# Patient Record
Sex: FEMALE | Race: WHITE
[De-identification: names, ages, dates, MRNs, and addresses within clinical notes are randomized per-mention and may not be internally consistent; named-entity substitution may affect disease eponyms.]

---

## 2018-05-09 ENCOUNTER — HOSPITAL ENCOUNTER (OUTPATIENT)
Dept: HOSPITAL 84 - D.LABREF | Age: 73
Discharge: HOME | End: 2018-05-09
Attending: ORTHOPAEDIC SURGERY
Payer: MEDICARE

## 2018-05-09 VITALS — BODY MASS INDEX: 50.4 KG/M2

## 2018-05-09 DIAGNOSIS — M17.12: Primary | ICD-10-CM

## 2018-05-18 ENCOUNTER — HOSPITAL ENCOUNTER (INPATIENT)
Dept: HOSPITAL 84 - D.MS | Age: 73
LOS: 13 days | Discharge: NURSING, REHAB OR LONG-TERM CARE | DRG: 467 | End: 2018-05-31
Attending: ORTHOPAEDIC SURGERY | Admitting: ORTHOPAEDIC SURGERY
Payer: MEDICARE

## 2018-05-18 VITALS — DIASTOLIC BLOOD PRESSURE: 69 MMHG | SYSTOLIC BLOOD PRESSURE: 121 MMHG

## 2018-05-18 VITALS — DIASTOLIC BLOOD PRESSURE: 64 MMHG | SYSTOLIC BLOOD PRESSURE: 162 MMHG

## 2018-05-18 VITALS — DIASTOLIC BLOOD PRESSURE: 59 MMHG | SYSTOLIC BLOOD PRESSURE: 115 MMHG

## 2018-05-18 VITALS
WEIGHT: 285 LBS | BODY MASS INDEX: 50.5 KG/M2 | BODY MASS INDEX: 50.5 KG/M2 | WEIGHT: 285 LBS | BODY MASS INDEX: 50.5 KG/M2 | HEIGHT: 63 IN | HEIGHT: 63 IN | BODY MASS INDEX: 50.5 KG/M2

## 2018-05-18 VITALS — DIASTOLIC BLOOD PRESSURE: 55 MMHG | SYSTOLIC BLOOD PRESSURE: 124 MMHG

## 2018-05-18 VITALS — DIASTOLIC BLOOD PRESSURE: 61 MMHG | SYSTOLIC BLOOD PRESSURE: 98 MMHG

## 2018-05-18 VITALS — SYSTOLIC BLOOD PRESSURE: 119 MMHG | DIASTOLIC BLOOD PRESSURE: 66 MMHG

## 2018-05-18 VITALS — SYSTOLIC BLOOD PRESSURE: 113 MMHG | DIASTOLIC BLOOD PRESSURE: 47 MMHG

## 2018-05-18 VITALS — SYSTOLIC BLOOD PRESSURE: 127 MMHG | DIASTOLIC BLOOD PRESSURE: 70 MMHG

## 2018-05-18 VITALS — SYSTOLIC BLOOD PRESSURE: 145 MMHG | DIASTOLIC BLOOD PRESSURE: 64 MMHG

## 2018-05-18 VITALS — DIASTOLIC BLOOD PRESSURE: 79 MMHG | SYSTOLIC BLOOD PRESSURE: 147 MMHG

## 2018-05-18 VITALS — DIASTOLIC BLOOD PRESSURE: 65 MMHG | SYSTOLIC BLOOD PRESSURE: 128 MMHG

## 2018-05-18 DIAGNOSIS — M97.12XA: ICD-10-CM

## 2018-05-18 DIAGNOSIS — K56.7: ICD-10-CM

## 2018-05-18 DIAGNOSIS — I10: ICD-10-CM

## 2018-05-18 DIAGNOSIS — E66.01: ICD-10-CM

## 2018-05-18 DIAGNOSIS — K59.00: ICD-10-CM

## 2018-05-18 DIAGNOSIS — T84.033A: Primary | ICD-10-CM

## 2018-05-18 LAB
ERYTHROCYTE [DISTWIDTH] IN BLOOD BY AUTOMATED COUNT: 14.1 % (ref 11.5–14.5)
HCT VFR BLD CALC: 33.9 % (ref 36–48)
HGB BLD-MCNC: 11.3 G/DL (ref 12–16)
MCH RBC QN AUTO: 27.5 PG (ref 26–34)
MCHC RBC AUTO-ENTMCNC: 33.3 G/DL (ref 31–37)
MCV RBC: 82.5 FL (ref 80–100)
PLATELET # BLD: 269 10X3/UL (ref 130–400)
PMV BLD AUTO: 10 FL (ref 7.4–10.4)
RBC # BLD AUTO: 4.11 10X6/UL (ref 4–5.4)
WBC # BLD AUTO: 9.5 10X3/UL (ref 4.8–10.8)

## 2018-05-18 PROCEDURE — 0QSH04Z REPOSITION LEFT TIBIA WITH INTERNAL FIXATION DEVICE, OPEN APPROACH: ICD-10-PCS | Performed by: ORTHOPAEDIC SURGERY

## 2018-05-18 PROCEDURE — 0SPD0JZ REMOVAL OF SYNTHETIC SUBSTITUTE FROM LEFT KNEE JOINT, OPEN APPROACH: ICD-10-PCS | Performed by: ORTHOPAEDIC SURGERY

## 2018-05-18 PROCEDURE — 0SRD0JZ REPLACEMENT OF LEFT KNEE JOINT WITH SYNTHETIC SUBSTITUTE, OPEN APPROACH: ICD-10-PCS | Performed by: ORTHOPAEDIC SURGERY

## 2018-05-19 VITALS — SYSTOLIC BLOOD PRESSURE: 136 MMHG | DIASTOLIC BLOOD PRESSURE: 49 MMHG

## 2018-05-19 VITALS — SYSTOLIC BLOOD PRESSURE: 129 MMHG | DIASTOLIC BLOOD PRESSURE: 55 MMHG

## 2018-05-19 VITALS — DIASTOLIC BLOOD PRESSURE: 52 MMHG | SYSTOLIC BLOOD PRESSURE: 113 MMHG

## 2018-05-19 VITALS — DIASTOLIC BLOOD PRESSURE: 42 MMHG | SYSTOLIC BLOOD PRESSURE: 121 MMHG

## 2018-05-19 VITALS — SYSTOLIC BLOOD PRESSURE: 149 MMHG | DIASTOLIC BLOOD PRESSURE: 57 MMHG

## 2018-05-19 LAB
ERYTHROCYTE [DISTWIDTH] IN BLOOD BY AUTOMATED COUNT: 14.2 % (ref 11.5–14.5)
HCT VFR BLD CALC: 29.3 % (ref 36–48)
HGB BLD-MCNC: 9.9 G/DL (ref 12–16)
MCH RBC QN AUTO: 27.7 PG (ref 26–34)
MCHC RBC AUTO-ENTMCNC: 33.8 G/DL (ref 31–37)
MCV RBC: 81.8 FL (ref 80–100)
PLATELET # BLD: 263 10X3/UL (ref 130–400)
PMV BLD AUTO: 10.1 FL (ref 7.4–10.4)
RBC # BLD AUTO: 3.58 10X6/UL (ref 4–5.4)
WBC # BLD AUTO: 13.4 10X3/UL (ref 4.8–10.8)

## 2018-05-20 VITALS — DIASTOLIC BLOOD PRESSURE: 52 MMHG | SYSTOLIC BLOOD PRESSURE: 135 MMHG

## 2018-05-20 VITALS — DIASTOLIC BLOOD PRESSURE: 57 MMHG | SYSTOLIC BLOOD PRESSURE: 137 MMHG

## 2018-05-20 LAB
ERYTHROCYTE [DISTWIDTH] IN BLOOD BY AUTOMATED COUNT: 14.4 % (ref 11.5–14.5)
HCT VFR BLD CALC: 27.6 % (ref 36–48)
HGB BLD-MCNC: 9 G/DL (ref 12–16)
MCH RBC QN AUTO: 27.3 PG (ref 26–34)
MCHC RBC AUTO-ENTMCNC: 32.6 G/DL (ref 31–37)
MCV RBC: 83.6 FL (ref 80–100)
PLATELET # BLD: 203 10X3/UL (ref 130–400)
PMV BLD AUTO: 9.8 FL (ref 7.4–10.4)
RBC # BLD AUTO: 3.3 10X6/UL (ref 4–5.4)
WBC # BLD AUTO: 10 10X3/UL (ref 4.8–10.8)

## 2018-05-21 VITALS — DIASTOLIC BLOOD PRESSURE: 49 MMHG | SYSTOLIC BLOOD PRESSURE: 154 MMHG

## 2018-05-21 VITALS — DIASTOLIC BLOOD PRESSURE: 64 MMHG | SYSTOLIC BLOOD PRESSURE: 139 MMHG

## 2018-05-21 VITALS — SYSTOLIC BLOOD PRESSURE: 134 MMHG | DIASTOLIC BLOOD PRESSURE: 59 MMHG

## 2018-05-21 VITALS — SYSTOLIC BLOOD PRESSURE: 154 MMHG | DIASTOLIC BLOOD PRESSURE: 57 MMHG

## 2018-05-21 VITALS — DIASTOLIC BLOOD PRESSURE: 66 MMHG | SYSTOLIC BLOOD PRESSURE: 138 MMHG

## 2018-05-22 VITALS — DIASTOLIC BLOOD PRESSURE: 64 MMHG | SYSTOLIC BLOOD PRESSURE: 175 MMHG

## 2018-05-22 VITALS — DIASTOLIC BLOOD PRESSURE: 71 MMHG | SYSTOLIC BLOOD PRESSURE: 162 MMHG

## 2018-05-22 VITALS — DIASTOLIC BLOOD PRESSURE: 57 MMHG | SYSTOLIC BLOOD PRESSURE: 159 MMHG

## 2018-05-22 VITALS — DIASTOLIC BLOOD PRESSURE: 58 MMHG | SYSTOLIC BLOOD PRESSURE: 159 MMHG

## 2018-05-22 VITALS — DIASTOLIC BLOOD PRESSURE: 66 MMHG | SYSTOLIC BLOOD PRESSURE: 160 MMHG

## 2018-05-22 LAB
ANION GAP SERPL CALC-SCNC: 12.6 MMOL/L (ref 8–16)
BUN SERPL-MCNC: 14 MG/DL (ref 7–18)
CALCIUM SERPL-MCNC: 8.8 MG/DL (ref 8.5–10.1)
CHLORIDE SERPL-SCNC: 103 MMOL/L (ref 98–107)
CO2 SERPL-SCNC: 24.5 MMOL/L (ref 21–32)
CREAT SERPL-MCNC: 0.9 MG/DL (ref 0.6–1.3)
ERYTHROCYTE [DISTWIDTH] IN BLOOD BY AUTOMATED COUNT: 13.9 % (ref 11.5–14.5)
GLUCOSE SERPL-MCNC: 126 MG/DL (ref 74–106)
HCT VFR BLD CALC: 30.8 % (ref 36–48)
HGB BLD-MCNC: 10.4 G/DL (ref 12–16)
MCH RBC QN AUTO: 27.8 PG (ref 26–34)
MCHC RBC AUTO-ENTMCNC: 33.8 G/DL (ref 31–37)
MCV RBC: 82.4 FL (ref 80–100)
OSMOLALITY SERPL CALC.SUM OF ELEC: 274 MOSM/KG (ref 275–300)
PLATELET # BLD: 276 10X3/UL (ref 130–400)
PMV BLD AUTO: 9.3 FL (ref 7.4–10.4)
POTASSIUM SERPL-SCNC: 4.1 MMOL/L (ref 3.5–5.1)
RBC # BLD AUTO: 3.74 10X6/UL (ref 4–5.4)
SODIUM SERPL-SCNC: 136 MMOL/L (ref 136–145)
WBC # BLD AUTO: 13.3 10X3/UL (ref 4.8–10.8)

## 2018-05-23 VITALS — DIASTOLIC BLOOD PRESSURE: 58 MMHG | SYSTOLIC BLOOD PRESSURE: 164 MMHG

## 2018-05-23 VITALS — DIASTOLIC BLOOD PRESSURE: 55 MMHG | SYSTOLIC BLOOD PRESSURE: 144 MMHG

## 2018-05-23 VITALS — DIASTOLIC BLOOD PRESSURE: 65 MMHG | SYSTOLIC BLOOD PRESSURE: 148 MMHG

## 2018-05-23 VITALS — DIASTOLIC BLOOD PRESSURE: 55 MMHG | SYSTOLIC BLOOD PRESSURE: 148 MMHG

## 2018-05-23 VITALS — SYSTOLIC BLOOD PRESSURE: 177 MMHG | DIASTOLIC BLOOD PRESSURE: 75 MMHG

## 2018-05-23 LAB
ANION GAP SERPL CALC-SCNC: 13.2 MMOL/L (ref 8–16)
BUN SERPL-MCNC: 24 MG/DL (ref 7–18)
CALCIUM SERPL-MCNC: 8.8 MG/DL (ref 8.5–10.1)
CHLORIDE SERPL-SCNC: 103 MMOL/L (ref 98–107)
CO2 SERPL-SCNC: 24.3 MMOL/L (ref 21–32)
CREAT SERPL-MCNC: 1 MG/DL (ref 0.6–1.3)
ERYTHROCYTE [DISTWIDTH] IN BLOOD BY AUTOMATED COUNT: 14.1 % (ref 11.5–14.5)
GLUCOSE SERPL-MCNC: 133 MG/DL (ref 74–106)
HCT VFR BLD CALC: 30.7 % (ref 36–48)
HGB BLD-MCNC: 10.3 G/DL (ref 12–16)
MCH RBC QN AUTO: 27.5 PG (ref 26–34)
MCHC RBC AUTO-ENTMCNC: 33.6 G/DL (ref 31–37)
MCV RBC: 82.1 FL (ref 80–100)
OSMOLALITY SERPL CALC.SUM OF ELEC: 279 MOSM/KG (ref 275–300)
PLATELET # BLD: 325 10X3/UL (ref 130–400)
PMV BLD AUTO: 9.7 FL (ref 7.4–10.4)
POTASSIUM SERPL-SCNC: 3.5 MMOL/L (ref 3.5–5.1)
RBC # BLD AUTO: 3.74 10X6/UL (ref 4–5.4)
SODIUM SERPL-SCNC: 137 MMOL/L (ref 136–145)
WBC # BLD AUTO: 15.4 10X3/UL (ref 4.8–10.8)

## 2018-05-24 VITALS — DIASTOLIC BLOOD PRESSURE: 72 MMHG | SYSTOLIC BLOOD PRESSURE: 135 MMHG

## 2018-05-24 VITALS — SYSTOLIC BLOOD PRESSURE: 141 MMHG | DIASTOLIC BLOOD PRESSURE: 72 MMHG

## 2018-05-24 VITALS — SYSTOLIC BLOOD PRESSURE: 154 MMHG | DIASTOLIC BLOOD PRESSURE: 69 MMHG

## 2018-05-24 VITALS — SYSTOLIC BLOOD PRESSURE: 155 MMHG | DIASTOLIC BLOOD PRESSURE: 62 MMHG

## 2018-05-24 VITALS — DIASTOLIC BLOOD PRESSURE: 79 MMHG | SYSTOLIC BLOOD PRESSURE: 150 MMHG

## 2018-05-24 LAB
AMORPHOUS SEDIMENT: (no result) /LPF
ANION GAP SERPL CALC-SCNC: 13 MMOL/L (ref 8–16)
APPEARANCE UR: (no result)
BACTERIA #/AREA URNS HPF: (no result) /HPF
BILIRUB SERPL-MCNC: NEGATIVE MG/DL
BUN SERPL-MCNC: 27 MG/DL (ref 7–18)
CALCIUM SERPL-MCNC: 9.1 MG/DL (ref 8.5–10.1)
CHLORIDE SERPL-SCNC: 103 MMOL/L (ref 98–107)
CO2 SERPL-SCNC: 27.8 MMOL/L (ref 21–32)
COLOR UR: (no result)
CREAT SERPL-MCNC: 1 MG/DL (ref 0.6–1.3)
ERYTHROCYTE [DISTWIDTH] IN BLOOD BY AUTOMATED COUNT: 14.2 % (ref 11.5–14.5)
GLUCOSE SERPL-MCNC: 135 MG/DL (ref 74–106)
GLUCOSE SERPL-MCNC: NEGATIVE MG/DL
GRAN CASTS #/AREA URNS LPF: (no result) /LPF
HCT VFR BLD CALC: 35.6 % (ref 36–48)
HGB BLD-MCNC: 11.9 G/DL (ref 12–16)
KETONES UR STRIP-MCNC: NEGATIVE MG/DL
MCH RBC QN AUTO: 27.7 PG (ref 26–34)
MCHC RBC AUTO-ENTMCNC: 33.4 G/DL (ref 31–37)
MCV RBC: 83 FL (ref 80–100)
MUCOUS THREADS #/AREA URNS LPF: (no result) /LPF
NITRITE UR-MCNC: NEGATIVE MG/ML
OSMOLALITY SERPL CALC.SUM OF ELEC: 285 MOSM/KG (ref 275–300)
PH UR STRIP: 5 [PH] (ref 5–6)
PLATELET # BLD: 436 10X3/UL (ref 130–400)
PMV BLD AUTO: 9.5 FL (ref 7.4–10.4)
POTASSIUM SERPL-SCNC: 3.8 MMOL/L (ref 3.5–5.1)
PROT UR-MCNC: (no result) MG/DL
RBC # BLD AUTO: 4.29 10X6/UL (ref 4–5.4)
RBC #/AREA URNS HPF: (no result) /HPF (ref 0–5)
SODIUM SERPL-SCNC: 140 MMOL/L (ref 136–145)
SP GR UR STRIP: 1.02 (ref 1–1.02)
SQUAMOUS #/AREA URNS HPF: (no result) /HPF (ref 0–5)
UROBILINOGEN UR-MCNC: NORMAL MG/DL
WBC # BLD AUTO: 17.7 10X3/UL (ref 4.8–10.8)
WBC #/AREA URNS HPF: (no result) /HPF (ref 0–5)

## 2018-05-25 VITALS — SYSTOLIC BLOOD PRESSURE: 148 MMHG | DIASTOLIC BLOOD PRESSURE: 70 MMHG

## 2018-05-25 VITALS — DIASTOLIC BLOOD PRESSURE: 81 MMHG | SYSTOLIC BLOOD PRESSURE: 147 MMHG

## 2018-05-25 VITALS — DIASTOLIC BLOOD PRESSURE: 70 MMHG | SYSTOLIC BLOOD PRESSURE: 172 MMHG

## 2018-05-25 VITALS — DIASTOLIC BLOOD PRESSURE: 69 MMHG | SYSTOLIC BLOOD PRESSURE: 146 MMHG

## 2018-05-25 VITALS — SYSTOLIC BLOOD PRESSURE: 128 MMHG | DIASTOLIC BLOOD PRESSURE: 61 MMHG

## 2018-05-25 VITALS — DIASTOLIC BLOOD PRESSURE: 77 MMHG | SYSTOLIC BLOOD PRESSURE: 156 MMHG

## 2018-05-25 LAB
ANION GAP SERPL CALC-SCNC: 15.4 MMOL/L (ref 8–16)
BUN SERPL-MCNC: 31 MG/DL (ref 7–18)
CALCIUM SERPL-MCNC: 8.8 MG/DL (ref 8.5–10.1)
CHLORIDE SERPL-SCNC: 104 MMOL/L (ref 98–107)
CO2 SERPL-SCNC: 25.4 MMOL/L (ref 21–32)
CREAT SERPL-MCNC: 1 MG/DL (ref 0.6–1.3)
ERYTHROCYTE [DISTWIDTH] IN BLOOD BY AUTOMATED COUNT: 14.4 % (ref 11.5–14.5)
EST. AVERAGE GLUCOSE BLD GHB EST-MCNC: 123 MG/DL (ref 74–154)
FERRITIN SERPL-MCNC: 171 NG/ML (ref 3–244)
GLUCOSE SERPL-MCNC: 132 MG/DL (ref 74–106)
HCT VFR BLD CALC: 34.1 % (ref 36–48)
HGB BLD-MCNC: 11.3 G/DL (ref 12–16)
IRON SERPL-MCNC: 23 UG/DL (ref 35–150)
MAGNESIUM SERPL-MCNC: 2.5 MG/DL (ref 1.8–2.4)
MCH RBC QN AUTO: 27.6 PG (ref 26–34)
MCHC RBC AUTO-ENTMCNC: 33.1 G/DL (ref 31–37)
MCV RBC: 83.2 FL (ref 80–100)
OSMOLALITY SERPL CALC.SUM OF ELEC: 289 MOSM/KG (ref 275–300)
PLATELET # BLD: 482 10X3/UL (ref 130–400)
PMV BLD AUTO: 9.7 FL (ref 7.4–10.4)
POTASSIUM SERPL-SCNC: 3.8 MMOL/L (ref 3.5–5.1)
RBC # BLD AUTO: 4.1 10X6/UL (ref 4–5.4)
SAO2 % BLD FROM PO2: 13 % (ref 15–55)
SODIUM SERPL-SCNC: 141 MMOL/L (ref 136–145)
TIBC SERPL-MCNC: 170 UG/DL (ref 260–445)
UIBC SERPL-MCNC: 147 UG/DL (ref 150–375)
WBC # BLD AUTO: 22.3 10X3/UL (ref 4.8–10.8)

## 2018-05-26 VITALS — DIASTOLIC BLOOD PRESSURE: 64 MMHG | SYSTOLIC BLOOD PRESSURE: 129 MMHG

## 2018-05-26 VITALS — DIASTOLIC BLOOD PRESSURE: 54 MMHG | SYSTOLIC BLOOD PRESSURE: 133 MMHG

## 2018-05-26 VITALS — SYSTOLIC BLOOD PRESSURE: 158 MMHG | DIASTOLIC BLOOD PRESSURE: 74 MMHG

## 2018-05-26 VITALS — SYSTOLIC BLOOD PRESSURE: 149 MMHG | DIASTOLIC BLOOD PRESSURE: 58 MMHG

## 2018-05-26 VITALS — SYSTOLIC BLOOD PRESSURE: 155 MMHG | DIASTOLIC BLOOD PRESSURE: 67 MMHG

## 2018-05-27 VITALS — SYSTOLIC BLOOD PRESSURE: 116 MMHG | DIASTOLIC BLOOD PRESSURE: 60 MMHG

## 2018-05-27 VITALS — DIASTOLIC BLOOD PRESSURE: 71 MMHG | SYSTOLIC BLOOD PRESSURE: 147 MMHG

## 2018-05-27 VITALS — SYSTOLIC BLOOD PRESSURE: 129 MMHG | DIASTOLIC BLOOD PRESSURE: 60 MMHG

## 2018-05-27 VITALS — DIASTOLIC BLOOD PRESSURE: 70 MMHG | SYSTOLIC BLOOD PRESSURE: 140 MMHG

## 2018-05-27 VITALS — DIASTOLIC BLOOD PRESSURE: 56 MMHG | SYSTOLIC BLOOD PRESSURE: 148 MMHG

## 2018-05-28 VITALS — SYSTOLIC BLOOD PRESSURE: 124 MMHG | DIASTOLIC BLOOD PRESSURE: 64 MMHG

## 2018-05-28 VITALS — DIASTOLIC BLOOD PRESSURE: 56 MMHG | SYSTOLIC BLOOD PRESSURE: 103 MMHG

## 2018-05-28 VITALS — SYSTOLIC BLOOD PRESSURE: 147 MMHG | DIASTOLIC BLOOD PRESSURE: 62 MMHG

## 2018-05-28 VITALS — DIASTOLIC BLOOD PRESSURE: 68 MMHG | SYSTOLIC BLOOD PRESSURE: 138 MMHG

## 2018-05-28 VITALS — SYSTOLIC BLOOD PRESSURE: 120 MMHG | DIASTOLIC BLOOD PRESSURE: 59 MMHG

## 2018-05-28 VITALS — DIASTOLIC BLOOD PRESSURE: 56 MMHG | SYSTOLIC BLOOD PRESSURE: 122 MMHG

## 2018-05-28 LAB
ANION GAP SERPL CALC-SCNC: 6.1 MMOL/L (ref 8–16)
BASOPHILS NFR BLD AUTO: 0.2 % (ref 0–2)
BUN SERPL-MCNC: 12 MG/DL (ref 7–18)
CALCIUM SERPL-MCNC: 7.9 MG/DL (ref 8.5–10.1)
CHLORIDE SERPL-SCNC: 107 MMOL/L (ref 98–107)
CO2 SERPL-SCNC: 30 MMOL/L (ref 21–32)
CREAT SERPL-MCNC: 0.9 MG/DL (ref 0.6–1.3)
EOSINOPHIL NFR BLD: 3.8 % (ref 0–7)
ERYTHROCYTE [DISTWIDTH] IN BLOOD BY AUTOMATED COUNT: 14.3 % (ref 11.5–14.5)
GLUCOSE SERPL-MCNC: 103 MG/DL (ref 74–106)
HCT VFR BLD CALC: 28.9 % (ref 36–48)
HGB BLD-MCNC: 9.4 G/DL (ref 12–16)
IMM GRANULOCYTES NFR BLD: 2.1 % (ref 0–5)
LYMPHOCYTES NFR BLD AUTO: 14.3 % (ref 15–50)
MAGNESIUM SERPL-MCNC: 2.1 MG/DL (ref 1.8–2.4)
MCH RBC QN AUTO: 27.5 PG (ref 26–34)
MCHC RBC AUTO-ENTMCNC: 32.5 G/DL (ref 31–37)
MCV RBC: 84.5 FL (ref 80–100)
MONOCYTES NFR BLD: 8.3 % (ref 2–11)
NEUTROPHILS NFR BLD AUTO: 71.3 % (ref 40–80)
OSMOLALITY SERPL CALC.SUM OF ELEC: 278 MOSM/KG (ref 275–300)
PHOSPHATE SERPL-MCNC: 2.8 MG/DL (ref 2.5–4.9)
PLATELET # BLD: 324 10X3/UL (ref 130–400)
PMV BLD AUTO: 9.4 FL (ref 7.4–10.4)
POTASSIUM SERPL-SCNC: 3.1 MMOL/L (ref 3.5–5.1)
RBC # BLD AUTO: 3.42 10X6/UL (ref 4–5.4)
SODIUM SERPL-SCNC: 140 MMOL/L (ref 136–145)
WBC # BLD AUTO: 10 10X3/UL (ref 4.8–10.8)

## 2018-05-29 VITALS — SYSTOLIC BLOOD PRESSURE: 151 MMHG | DIASTOLIC BLOOD PRESSURE: 70 MMHG

## 2018-05-29 VITALS — DIASTOLIC BLOOD PRESSURE: 67 MMHG | SYSTOLIC BLOOD PRESSURE: 164 MMHG

## 2018-05-29 VITALS — DIASTOLIC BLOOD PRESSURE: 84 MMHG | SYSTOLIC BLOOD PRESSURE: 172 MMHG

## 2018-05-29 VITALS — SYSTOLIC BLOOD PRESSURE: 133 MMHG | DIASTOLIC BLOOD PRESSURE: 60 MMHG

## 2018-05-29 VITALS — SYSTOLIC BLOOD PRESSURE: 156 MMHG | DIASTOLIC BLOOD PRESSURE: 68 MMHG

## 2018-05-29 LAB
ANION GAP SERPL CALC-SCNC: 10.1 MMOL/L (ref 8–16)
BASOPHILS NFR BLD AUTO: 0.3 % (ref 0–2)
BUN SERPL-MCNC: 9 MG/DL (ref 7–18)
CALCIUM SERPL-MCNC: 8 MG/DL (ref 8.5–10.1)
CHLORIDE SERPL-SCNC: 104 MMOL/L (ref 98–107)
CO2 SERPL-SCNC: 28.7 MMOL/L (ref 21–32)
CREAT SERPL-MCNC: 0.9 MG/DL (ref 0.6–1.3)
EOSINOPHIL NFR BLD: 3.3 % (ref 0–7)
ERYTHROCYTE [DISTWIDTH] IN BLOOD BY AUTOMATED COUNT: 14.3 % (ref 11.5–14.5)
FOLATE SERPL-MCNC: 5.4 NG/ML (ref 3–?)
GLUCOSE SERPL-MCNC: 118 MG/DL (ref 74–106)
HCT VFR BLD CALC: 28.8 % (ref 36–48)
HGB BLD-MCNC: 9.5 G/DL (ref 12–16)
IMM GRANULOCYTES NFR BLD: 2.2 % (ref 0–5)
LYMPHOCYTES NFR BLD AUTO: 15.3 % (ref 15–50)
MAGNESIUM SERPL-MCNC: 1.6 MG/DL (ref 1.8–2.4)
MAGNESIUM SERPL-MCNC: 1.7 MG/DL (ref 1.8–2.4)
MCH RBC QN AUTO: 27.6 PG (ref 26–34)
MCHC RBC AUTO-ENTMCNC: 33 G/DL (ref 31–37)
MCV RBC: 83.7 FL (ref 80–100)
MONOCYTES NFR BLD: 6.5 % (ref 2–11)
NEUTROPHILS NFR BLD AUTO: 72.4 % (ref 40–80)
OSMOLALITY SERPL CALC.SUM OF ELEC: 278 MOSM/KG (ref 275–300)
PHOSPHATE SERPL-MCNC: 2.7 MG/DL (ref 2.5–4.9)
PLATELET # BLD: 350 10X3/UL (ref 130–400)
PMV BLD AUTO: 9.4 FL (ref 7.4–10.4)
POTASSIUM SERPL-SCNC: 2.8 MMOL/L (ref 3.5–5.1)
POTASSIUM SERPL-SCNC: 3.4 MMOL/L (ref 3.5–5.1)
RBC # BLD AUTO: 3.44 10X6/UL (ref 4–5.4)
SODIUM SERPL-SCNC: 140 MMOL/L (ref 136–145)
VIT B12 SERPL-MCNC: 535 PG/ML (ref 232–1245)
WBC # BLD AUTO: 11.9 10X3/UL (ref 4.8–10.8)

## 2018-05-30 VITALS — DIASTOLIC BLOOD PRESSURE: 79 MMHG | SYSTOLIC BLOOD PRESSURE: 174 MMHG

## 2018-05-30 VITALS — SYSTOLIC BLOOD PRESSURE: 156 MMHG | DIASTOLIC BLOOD PRESSURE: 74 MMHG

## 2018-05-30 VITALS — SYSTOLIC BLOOD PRESSURE: 144 MMHG | DIASTOLIC BLOOD PRESSURE: 70 MMHG

## 2018-05-30 VITALS — DIASTOLIC BLOOD PRESSURE: 75 MMHG | SYSTOLIC BLOOD PRESSURE: 162 MMHG

## 2018-05-30 LAB
ANION GAP SERPL CALC-SCNC: 11.3 MMOL/L (ref 8–16)
BASOPHILS NFR BLD AUTO: 0.2 % (ref 0–2)
BUN SERPL-MCNC: 6 MG/DL (ref 7–18)
CALCIUM SERPL-MCNC: 8.3 MG/DL (ref 8.5–10.1)
CHLORIDE SERPL-SCNC: 103 MMOL/L (ref 98–107)
CO2 SERPL-SCNC: 27.4 MMOL/L (ref 21–32)
CREAT SERPL-MCNC: 0.9 MG/DL (ref 0.6–1.3)
EOSINOPHIL NFR BLD: 2 % (ref 0–7)
ERYTHROCYTE [DISTWIDTH] IN BLOOD BY AUTOMATED COUNT: 14.4 % (ref 11.5–14.5)
GLUCOSE SERPL-MCNC: 118 MG/DL (ref 74–106)
HCT VFR BLD CALC: 31.2 % (ref 36–48)
HGB BLD-MCNC: 10.3 G/DL (ref 12–16)
IMM GRANULOCYTES NFR BLD: 3.4 % (ref 0–5)
LYMPHOCYTES NFR BLD AUTO: 14.3 % (ref 15–50)
MAGNESIUM SERPL-MCNC: 1.7 MG/DL (ref 1.8–2.4)
MAGNESIUM SERPL-MCNC: 2 MG/DL (ref 1.8–2.4)
MCH RBC QN AUTO: 27.4 PG (ref 26–34)
MCHC RBC AUTO-ENTMCNC: 33 G/DL (ref 31–37)
MCV RBC: 83 FL (ref 80–100)
MONOCYTES NFR BLD: 7.6 % (ref 2–11)
NEUTROPHILS NFR BLD AUTO: 72.5 % (ref 40–80)
OSMOLALITY SERPL CALC.SUM OF ELEC: 276 MOSM/KG (ref 275–300)
PHOSPHATE SERPL-MCNC: 2.3 MG/DL (ref 2.5–4.9)
PLATELET # BLD: 387 10X3/UL (ref 130–400)
PMV BLD AUTO: 9.6 FL (ref 7.4–10.4)
POTASSIUM SERPL-SCNC: 2.7 MMOL/L (ref 3.5–5.1)
POTASSIUM SERPL-SCNC: 3 MMOL/L (ref 3.5–5.1)
RBC # BLD AUTO: 3.76 10X6/UL (ref 4–5.4)
SODIUM SERPL-SCNC: 139 MMOL/L (ref 136–145)
WBC # BLD AUTO: 11.6 10X3/UL (ref 4.8–10.8)

## 2018-05-31 VITALS — SYSTOLIC BLOOD PRESSURE: 145 MMHG | DIASTOLIC BLOOD PRESSURE: 77 MMHG

## 2018-05-31 VITALS — SYSTOLIC BLOOD PRESSURE: 141 MMHG | DIASTOLIC BLOOD PRESSURE: 74 MMHG

## 2018-05-31 LAB
ANION GAP SERPL CALC-SCNC: 8.9 MMOL/L (ref 8–16)
BASOPHILS NFR BLD AUTO: 0.4 % (ref 0–2)
BUN SERPL-MCNC: 4 MG/DL (ref 7–18)
CALCIUM SERPL-MCNC: 8.1 MG/DL (ref 8.5–10.1)
CHLORIDE SERPL-SCNC: 106 MMOL/L (ref 98–107)
CO2 SERPL-SCNC: 28.1 MMOL/L (ref 21–32)
CREAT SERPL-MCNC: 0.8 MG/DL (ref 0.6–1.3)
EOSINOPHIL NFR BLD: 3.1 % (ref 0–7)
ERYTHROCYTE [DISTWIDTH] IN BLOOD BY AUTOMATED COUNT: 14.9 % (ref 11.5–14.5)
GLUCOSE SERPL-MCNC: 100 MG/DL (ref 74–106)
HCT VFR BLD CALC: 32.2 % (ref 36–48)
HGB BLD-MCNC: 10.4 G/DL (ref 12–16)
IMM GRANULOCYTES NFR BLD: 3.4 % (ref 0–5)
LYMPHOCYTES NFR BLD AUTO: 14.7 % (ref 15–50)
MAGNESIUM SERPL-MCNC: 1.9 MG/DL (ref 1.8–2.4)
MCH RBC QN AUTO: 27.2 PG (ref 26–34)
MCHC RBC AUTO-ENTMCNC: 32.3 G/DL (ref 31–37)
MCV RBC: 84.1 FL (ref 80–100)
MONOCYTES NFR BLD: 9.2 % (ref 2–11)
NEUTROPHILS NFR BLD AUTO: 69.2 % (ref 40–80)
OSMOLALITY SERPL CALC.SUM OF ELEC: 275 MOSM/KG (ref 275–300)
PHOSPHATE SERPL-MCNC: 2.2 MG/DL (ref 2.5–4.9)
PLATELET # BLD: 359 10X3/UL (ref 130–400)
PMV BLD AUTO: 9.3 FL (ref 7.4–10.4)
POTASSIUM SERPL-SCNC: 3 MMOL/L (ref 3.5–5.1)
RBC # BLD AUTO: 3.83 10X6/UL (ref 4–5.4)
SODIUM SERPL-SCNC: 140 MMOL/L (ref 136–145)
WBC # BLD AUTO: 9.6 10X3/UL (ref 4.8–10.8)

## 2018-08-21 ENCOUNTER — HOSPITAL ENCOUNTER (INPATIENT)
Dept: HOSPITAL 84 - D.SDCHOLD | Age: 73
LOS: 10 days | Discharge: NURSING, REHAB OR LONG-TERM CARE | DRG: 464 | End: 2018-08-31
Attending: ORTHOPAEDIC SURGERY | Admitting: ORTHOPAEDIC SURGERY
Payer: MEDICARE

## 2018-08-21 VITALS
HEIGHT: 63 IN | WEIGHT: 293 LBS | BODY MASS INDEX: 51.91 KG/M2 | BODY MASS INDEX: 51.91 KG/M2 | BODY MASS INDEX: 51.91 KG/M2

## 2018-08-21 VITALS — SYSTOLIC BLOOD PRESSURE: 120 MMHG | DIASTOLIC BLOOD PRESSURE: 45 MMHG

## 2018-08-21 VITALS — SYSTOLIC BLOOD PRESSURE: 106 MMHG | DIASTOLIC BLOOD PRESSURE: 58 MMHG

## 2018-08-21 DIAGNOSIS — T84.54XA: Primary | ICD-10-CM
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
## 2018-08-21 DIAGNOSIS — B96.20: ICD-10-CM

## 2018-08-21 DIAGNOSIS — S86.992A: ICD-10-CM

## 2018-08-21 DIAGNOSIS — E66.01: ICD-10-CM

## 2018-08-21 DIAGNOSIS — I50.9: ICD-10-CM

## 2018-08-21 DIAGNOSIS — S76.192A: ICD-10-CM

## 2018-08-21 DIAGNOSIS — I11.0: ICD-10-CM

## 2018-08-21 DIAGNOSIS — L03.116: ICD-10-CM

## 2018-08-21 LAB
ANION GAP SERPL CALC-SCNC: 11 MMOL/L (ref 8–16)
APPEARANCE UR: CLEAR
BASOPHILS NFR BLD AUTO: 0.2 % (ref 0–2)
BILIRUB SERPL-MCNC: NEGATIVE MG/DL
BUN SERPL-MCNC: 27 MG/DL (ref 7–18)
CALCIUM SERPL-MCNC: 8.6 MG/DL (ref 8.5–10.1)
CHLORIDE SERPL-SCNC: 99 MMOL/L (ref 98–107)
CO2 SERPL-SCNC: 30.4 MMOL/L (ref 21–32)
COLOR UR: YELLOW
CREAT SERPL-MCNC: 1.5 MG/DL (ref 0.6–1.3)
CRP SERPL-MCNC: 40.5 MG/DL (ref 0–0.9)
EOSINOPHIL NFR BLD: 0.3 % (ref 0–7)
ERYTHROCYTE [DISTWIDTH] IN BLOOD BY AUTOMATED COUNT: 17.4 % (ref 11.5–14.5)
ERYTHROCYTE [SEDIMENTATION RATE] IN BLOOD: 54 MM/HR (ref 0–30)
GLUCOSE SERPL-MCNC: 101 MG/DL (ref 74–106)
GLUCOSE SERPL-MCNC: NEGATIVE MG/DL
HCT VFR BLD CALC: 33 % (ref 36–48)
HGB BLD-MCNC: 10.9 G/DL (ref 12–16)
IMM GRANULOCYTES NFR BLD: 0.5 % (ref 0–5)
KETONES UR STRIP-MCNC: NEGATIVE MG/DL
LYMPHOCYTES NFR BLD AUTO: 9.6 % (ref 15–50)
MCH RBC QN AUTO: 27.9 PG (ref 26–34)
MCHC RBC AUTO-ENTMCNC: 33 G/DL (ref 31–37)
MCV RBC: 84.4 FL (ref 80–100)
MONOCYTES NFR BLD: 9.3 % (ref 2–11)
NEUTROPHILS NFR BLD AUTO: 80.1 % (ref 40–80)
NITRITE UR-MCNC: NEGATIVE MG/ML
OSMOLALITY SERPL CALC.SUM OF ELEC: 276 MOSM/KG (ref 275–300)
PH UR STRIP: 6 [PH] (ref 5–6)
PLATELET # BLD: 242 10X3/UL (ref 130–400)
PMV BLD AUTO: 9.8 FL (ref 7.4–10.4)
POTASSIUM SERPL-SCNC: 4.4 MMOL/L (ref 3.5–5.1)
PROT UR-MCNC: NEGATIVE MG/DL
RBC # BLD AUTO: 3.91 10X6/UL (ref 4–5.4)
SODIUM SERPL-SCNC: 136 MMOL/L (ref 136–145)
SP GR UR STRIP: 1.01 (ref 1–1.02)
UROBILINOGEN UR-MCNC: 1 MG/DL
WBC # BLD AUTO: 17.7 10X3/UL (ref 4.8–10.8)

## 2018-08-22 VITALS — DIASTOLIC BLOOD PRESSURE: 67 MMHG | SYSTOLIC BLOOD PRESSURE: 151 MMHG

## 2018-08-22 VITALS — DIASTOLIC BLOOD PRESSURE: 57 MMHG | SYSTOLIC BLOOD PRESSURE: 105 MMHG

## 2018-08-22 VITALS — SYSTOLIC BLOOD PRESSURE: 137 MMHG | DIASTOLIC BLOOD PRESSURE: 61 MMHG

## 2018-08-22 VITALS — SYSTOLIC BLOOD PRESSURE: 124 MMHG | DIASTOLIC BLOOD PRESSURE: 69 MMHG

## 2018-08-22 VITALS — DIASTOLIC BLOOD PRESSURE: 62 MMHG | SYSTOLIC BLOOD PRESSURE: 143 MMHG

## 2018-08-22 LAB
ANION GAP SERPL CALC-SCNC: 8.8 MMOL/L (ref 8–16)
BASOPHILS NFR BLD AUTO: 0.2 % (ref 0–2)
BUN SERPL-MCNC: 21 MG/DL (ref 7–18)
CALCIUM SERPL-MCNC: 8.4 MG/DL (ref 8.5–10.1)
CHLORIDE SERPL-SCNC: 101 MMOL/L (ref 98–107)
CO2 SERPL-SCNC: 29.9 MMOL/L (ref 21–32)
CREAT SERPL-MCNC: 1.2 MG/DL (ref 0.6–1.3)
EOSINOPHIL NFR BLD: 1.1 % (ref 0–7)
ERYTHROCYTE [DISTWIDTH] IN BLOOD BY AUTOMATED COUNT: 17.1 % (ref 11.5–14.5)
GLUCOSE SERPL-MCNC: 96 MG/DL (ref 74–106)
HCT VFR BLD CALC: 30 % (ref 36–48)
HGB BLD-MCNC: 9.9 G/DL (ref 12–16)
IMM GRANULOCYTES NFR BLD: 0.4 % (ref 0–5)
LYMPHOCYTES NFR BLD AUTO: 8.8 % (ref 15–50)
MCH RBC QN AUTO: 27.5 PG (ref 26–34)
MCHC RBC AUTO-ENTMCNC: 33 G/DL (ref 31–37)
MCV RBC: 83.3 FL (ref 80–100)
MONOCYTES NFR BLD: 7.6 % (ref 2–11)
NEUTROPHILS NFR BLD AUTO: 81.9 % (ref 40–80)
OSMOLALITY SERPL CALC.SUM OF ELEC: 274 MOSM/KG (ref 275–300)
PLATELET # BLD: 226 10X3/UL (ref 130–400)
PMV BLD AUTO: 9.7 FL (ref 7.4–10.4)
POTASSIUM SERPL-SCNC: 3.7 MMOL/L (ref 3.5–5.1)
RBC # BLD AUTO: 3.6 10X6/UL (ref 4–5.4)
SODIUM SERPL-SCNC: 136 MMOL/L (ref 136–145)
WBC # BLD AUTO: 11.4 10X3/UL (ref 4.8–10.8)

## 2018-08-22 PROCEDURE — 0S9D3ZZ DRAINAGE OF LEFT KNEE JOINT, PERCUTANEOUS APPROACH: ICD-10-PCS | Performed by: RADIOLOGY

## 2018-08-23 VITALS — DIASTOLIC BLOOD PRESSURE: 72 MMHG | SYSTOLIC BLOOD PRESSURE: 147 MMHG

## 2018-08-23 VITALS — DIASTOLIC BLOOD PRESSURE: 69 MMHG | SYSTOLIC BLOOD PRESSURE: 120 MMHG

## 2018-08-23 VITALS — DIASTOLIC BLOOD PRESSURE: 59 MMHG | SYSTOLIC BLOOD PRESSURE: 130 MMHG

## 2018-08-23 VITALS — SYSTOLIC BLOOD PRESSURE: 109 MMHG | DIASTOLIC BLOOD PRESSURE: 49 MMHG

## 2018-08-23 VITALS — DIASTOLIC BLOOD PRESSURE: 69 MMHG | SYSTOLIC BLOOD PRESSURE: 148 MMHG

## 2018-08-23 LAB
ANION GAP SERPL CALC-SCNC: 9.5 MMOL/L (ref 8–16)
BASOPHILS NFR BLD AUTO: 0.4 % (ref 0–2)
BUN SERPL-MCNC: 14 MG/DL (ref 7–18)
CALCIUM SERPL-MCNC: 8.5 MG/DL (ref 8.5–10.1)
CHLORIDE SERPL-SCNC: 101 MMOL/L (ref 98–107)
CO2 SERPL-SCNC: 29.5 MMOL/L (ref 21–32)
CREAT SERPL-MCNC: 1.1 MG/DL (ref 0.6–1.3)
EOSINOPHIL NFR BLD: 2.2 % (ref 0–7)
ERYTHROCYTE [DISTWIDTH] IN BLOOD BY AUTOMATED COUNT: 17 % (ref 11.5–14.5)
ERYTHROCYTE [SEDIMENTATION RATE] IN BLOOD: 75 MM/HR (ref 0–30)
GLUCOSE SERPL-MCNC: 87 MG/DL (ref 74–106)
HCT VFR BLD CALC: 31.4 % (ref 36–48)
HGB BLD-MCNC: 10.2 G/DL (ref 12–16)
IMM GRANULOCYTES NFR BLD: 1.1 % (ref 0–5)
LYMPHOCYTES NFR BLD AUTO: 10.5 % (ref 15–50)
MCH RBC QN AUTO: 27.3 PG (ref 26–34)
MCHC RBC AUTO-ENTMCNC: 32.5 G/DL (ref 31–37)
MCV RBC: 84.2 FL (ref 80–100)
MONOCYTES NFR BLD: 8.9 % (ref 2–11)
NEUTROPHILS NFR BLD AUTO: 76.9 % (ref 40–80)
OSMOLALITY SERPL CALC.SUM OF ELEC: 271 MOSM/KG (ref 275–300)
PLATELET # BLD: 249 10X3/UL (ref 130–400)
PMV BLD AUTO: 9.7 FL (ref 7.4–10.4)
POTASSIUM SERPL-SCNC: 4 MMOL/L (ref 3.5–5.1)
RBC # BLD AUTO: 3.73 10X6/UL (ref 4–5.4)
SODIUM SERPL-SCNC: 136 MMOL/L (ref 136–145)
WBC # BLD AUTO: 9.4 10X3/UL (ref 4.8–10.8)

## 2018-08-24 VITALS — SYSTOLIC BLOOD PRESSURE: 123 MMHG | DIASTOLIC BLOOD PRESSURE: 60 MMHG

## 2018-08-24 VITALS — DIASTOLIC BLOOD PRESSURE: 63 MMHG | SYSTOLIC BLOOD PRESSURE: 138 MMHG

## 2018-08-24 VITALS — DIASTOLIC BLOOD PRESSURE: 65 MMHG | SYSTOLIC BLOOD PRESSURE: 128 MMHG

## 2018-08-24 VITALS — DIASTOLIC BLOOD PRESSURE: 55 MMHG | SYSTOLIC BLOOD PRESSURE: 130 MMHG

## 2018-08-24 LAB
ANION GAP SERPL CALC-SCNC: 12 MMOL/L (ref 8–16)
BASOPHILS NFR BLD AUTO: 0.3 % (ref 0–2)
BUN SERPL-MCNC: 12 MG/DL (ref 7–18)
CALCIUM SERPL-MCNC: 8.4 MG/DL (ref 8.5–10.1)
CHLORIDE SERPL-SCNC: 101 MMOL/L (ref 98–107)
CO2 SERPL-SCNC: 27.8 MMOL/L (ref 21–32)
CREAT SERPL-MCNC: 1.1 MG/DL (ref 0.6–1.3)
EOSINOPHIL NFR BLD: 2 % (ref 0–7)
ERYTHROCYTE [DISTWIDTH] IN BLOOD BY AUTOMATED COUNT: 17.1 % (ref 11.5–14.5)
GLUCOSE SERPL-MCNC: 106 MG/DL (ref 74–106)
HCT VFR BLD CALC: 32.4 % (ref 36–48)
HGB BLD-MCNC: 10.6 G/DL (ref 12–16)
IMM GRANULOCYTES NFR BLD: 2.3 % (ref 0–5)
LYMPHOCYTES NFR BLD AUTO: 10.5 % (ref 15–50)
MCH RBC QN AUTO: 27.5 PG (ref 26–34)
MCHC RBC AUTO-ENTMCNC: 32.7 G/DL (ref 31–37)
MCV RBC: 84.2 FL (ref 80–100)
MONOCYTES NFR BLD: 11.5 % (ref 2–11)
NEUTROPHILS NFR BLD AUTO: 73.4 % (ref 40–80)
OSMOLALITY SERPL CALC.SUM OF ELEC: 273 MOSM/KG (ref 275–300)
PLATELET # BLD: 279 10X3/UL (ref 130–400)
PMV BLD AUTO: 9.8 FL (ref 7.4–10.4)
POTASSIUM SERPL-SCNC: 3.8 MMOL/L (ref 3.5–5.1)
RBC # BLD AUTO: 3.85 10X6/UL (ref 4–5.4)
SODIUM SERPL-SCNC: 137 MMOL/L (ref 136–145)
WBC # BLD AUTO: 10.6 10X3/UL (ref 4.8–10.8)

## 2018-08-25 VITALS — SYSTOLIC BLOOD PRESSURE: 146 MMHG | DIASTOLIC BLOOD PRESSURE: 60 MMHG

## 2018-08-25 VITALS — SYSTOLIC BLOOD PRESSURE: 121 MMHG | DIASTOLIC BLOOD PRESSURE: 54 MMHG

## 2018-08-25 VITALS — SYSTOLIC BLOOD PRESSURE: 114 MMHG | DIASTOLIC BLOOD PRESSURE: 48 MMHG

## 2018-08-25 VITALS — DIASTOLIC BLOOD PRESSURE: 60 MMHG | SYSTOLIC BLOOD PRESSURE: 134 MMHG

## 2018-08-25 LAB
ANION GAP SERPL CALC-SCNC: 7 MMOL/L (ref 8–16)
BASOPHILS NFR BLD AUTO: 0.4 % (ref 0–2)
BUN SERPL-MCNC: 12 MG/DL (ref 7–18)
CALCIUM SERPL-MCNC: 8.5 MG/DL (ref 8.5–10.1)
CHLORIDE SERPL-SCNC: 101 MMOL/L (ref 98–107)
CO2 SERPL-SCNC: 29 MMOL/L (ref 21–32)
CREAT SERPL-MCNC: 1 MG/DL (ref 0.6–1.3)
EOSINOPHIL NFR BLD: 2.6 % (ref 0–7)
ERYTHROCYTE [DISTWIDTH] IN BLOOD BY AUTOMATED COUNT: 17 % (ref 11.5–14.5)
GLUCOSE SERPL-MCNC: 103 MG/DL (ref 74–106)
HCT VFR BLD CALC: 31.7 % (ref 36–48)
HGB BLD-MCNC: 10.4 G/DL (ref 12–16)
IMM GRANULOCYTES NFR BLD: 3.4 % (ref 0–5)
LYMPHOCYTES NFR BLD AUTO: 12.2 % (ref 15–50)
MCH RBC QN AUTO: 27.3 PG (ref 26–34)
MCHC RBC AUTO-ENTMCNC: 32.8 G/DL (ref 31–37)
MCV RBC: 83.2 FL (ref 80–100)
MONOCYTES NFR BLD: 15.4 % (ref 2–11)
NEUTROPHILS NFR BLD AUTO: 66 % (ref 40–80)
OSMOLALITY SERPL CALC.SUM OF ELEC: 265 MOSM/KG (ref 275–300)
PLATELET # BLD: 292 10X3/UL (ref 130–400)
PMV BLD AUTO: 9.4 FL (ref 7.4–10.4)
POTASSIUM SERPL-SCNC: 4 MMOL/L (ref 3.5–5.1)
RBC # BLD AUTO: 3.81 10X6/UL (ref 4–5.4)
SODIUM SERPL-SCNC: 133 MMOL/L (ref 136–145)
WBC # BLD AUTO: 12.4 10X3/UL (ref 4.8–10.8)

## 2018-08-26 VITALS — DIASTOLIC BLOOD PRESSURE: 52 MMHG | SYSTOLIC BLOOD PRESSURE: 124 MMHG

## 2018-08-26 VITALS — SYSTOLIC BLOOD PRESSURE: 112 MMHG | DIASTOLIC BLOOD PRESSURE: 45 MMHG

## 2018-08-26 VITALS — SYSTOLIC BLOOD PRESSURE: 133 MMHG | DIASTOLIC BLOOD PRESSURE: 56 MMHG

## 2018-08-26 VITALS — SYSTOLIC BLOOD PRESSURE: 151 MMHG | DIASTOLIC BLOOD PRESSURE: 65 MMHG

## 2018-08-26 LAB
ANION GAP SERPL CALC-SCNC: 8.9 MMOL/L (ref 8–16)
BASOPHILS NFR BLD AUTO: 0.5 % (ref 0–2)
BUN SERPL-MCNC: 9 MG/DL (ref 7–18)
CALCIUM SERPL-MCNC: 8.2 MG/DL (ref 8.5–10.1)
CHLORIDE SERPL-SCNC: 102 MMOL/L (ref 98–107)
CO2 SERPL-SCNC: 28.2 MMOL/L (ref 21–32)
CREAT SERPL-MCNC: 0.9 MG/DL (ref 0.6–1.3)
EOSINOPHIL NFR BLD: 2.1 % (ref 0–7)
ERYTHROCYTE [DISTWIDTH] IN BLOOD BY AUTOMATED COUNT: 16.9 % (ref 11.5–14.5)
GLUCOSE SERPL-MCNC: 97 MG/DL (ref 74–106)
HCT VFR BLD CALC: 31.7 % (ref 36–48)
HGB BLD-MCNC: 10.5 G/DL (ref 12–16)
IMM GRANULOCYTES NFR BLD: 4.6 % (ref 0–5)
LYMPHOCYTES NFR BLD AUTO: 14.4 % (ref 15–50)
MCH RBC QN AUTO: 27.6 PG (ref 26–34)
MCHC RBC AUTO-ENTMCNC: 33.1 G/DL (ref 31–37)
MCV RBC: 83.4 FL (ref 80–100)
MONOCYTES NFR BLD: 13.9 % (ref 2–11)
NEUTROPHILS NFR BLD AUTO: 64.5 % (ref 40–80)
OSMOLALITY SERPL CALC.SUM OF ELEC: 268 MOSM/KG (ref 275–300)
PLATELET # BLD: 304 10X3/UL (ref 130–400)
PMV BLD AUTO: 9.3 FL (ref 7.4–10.4)
POTASSIUM SERPL-SCNC: 4.1 MMOL/L (ref 3.5–5.1)
RBC # BLD AUTO: 3.8 10X6/UL (ref 4–5.4)
SODIUM SERPL-SCNC: 135 MMOL/L (ref 136–145)
WBC # BLD AUTO: 12.5 10X3/UL (ref 4.8–10.8)

## 2018-08-27 VITALS — DIASTOLIC BLOOD PRESSURE: 64 MMHG | SYSTOLIC BLOOD PRESSURE: 138 MMHG

## 2018-08-27 VITALS — DIASTOLIC BLOOD PRESSURE: 51 MMHG | SYSTOLIC BLOOD PRESSURE: 120 MMHG

## 2018-08-27 VITALS — DIASTOLIC BLOOD PRESSURE: 67 MMHG | SYSTOLIC BLOOD PRESSURE: 140 MMHG

## 2018-08-27 VITALS — DIASTOLIC BLOOD PRESSURE: 52 MMHG | SYSTOLIC BLOOD PRESSURE: 119 MMHG

## 2018-08-27 LAB
ALBUMIN SERPL-MCNC: 1.8 G/DL (ref 3.4–5)
ALP SERPL-CCNC: 63 U/L (ref 46–116)
ALT SERPL-CCNC: 15 U/L (ref 10–68)
ANION GAP SERPL CALC-SCNC: 8.1 MMOL/L (ref 8–16)
BASOPHILS NFR BLD AUTO: 0.2 % (ref 0–2)
BILIRUB SERPL-MCNC: 0.24 MG/DL (ref 0.2–1.3)
BUN SERPL-MCNC: 10 MG/DL (ref 7–18)
CALCIUM SERPL-MCNC: 8.2 MG/DL (ref 8.5–10.1)
CHLORIDE SERPL-SCNC: 102 MMOL/L (ref 98–107)
CO2 SERPL-SCNC: 28.9 MMOL/L (ref 21–32)
CREAT SERPL-MCNC: 0.9 MG/DL (ref 0.6–1.3)
EOSINOPHIL NFR BLD: 3.3 % (ref 0–7)
ERYTHROCYTE [DISTWIDTH] IN BLOOD BY AUTOMATED COUNT: 16.9 % (ref 11.5–14.5)
GLOBULIN SER-MCNC: 3.8 G/L
GLUCOSE SERPL-MCNC: 97 MG/DL (ref 74–106)
HCT VFR BLD CALC: 30.6 % (ref 36–48)
HGB BLD-MCNC: 10.2 G/DL (ref 12–16)
IMM GRANULOCYTES NFR BLD: 4.7 % (ref 0–5)
LYMPHOCYTES NFR BLD AUTO: 13.8 % (ref 15–50)
MCH RBC QN AUTO: 27.6 PG (ref 26–34)
MCHC RBC AUTO-ENTMCNC: 33.3 G/DL (ref 31–37)
MCV RBC: 82.9 FL (ref 80–100)
MONOCYTES NFR BLD: 11 % (ref 2–11)
NEUTROPHILS NFR BLD AUTO: 67 % (ref 40–80)
OSMOLALITY SERPL CALC.SUM OF ELEC: 268 MOSM/KG (ref 275–300)
PLATELET # BLD: 334 10X3/UL (ref 130–400)
PMV BLD AUTO: 9.4 FL (ref 7.4–10.4)
POTASSIUM SERPL-SCNC: 4 MMOL/L (ref 3.5–5.1)
PROT SERPL-MCNC: 5.6 G/DL (ref 6.4–8.2)
RBC # BLD AUTO: 3.69 10X6/UL (ref 4–5.4)
SODIUM SERPL-SCNC: 135 MMOL/L (ref 136–145)
WBC # BLD AUTO: 13.3 10X3/UL (ref 4.8–10.8)

## 2018-08-27 PROCEDURE — 0SHD08Z INSERTION OF SPACER INTO LEFT KNEE JOINT, OPEN APPROACH: ICD-10-PCS | Performed by: ORTHOPAEDIC SURGERY

## 2018-08-27 PROCEDURE — 0SPD0JZ REMOVAL OF SYNTHETIC SUBSTITUTE FROM LEFT KNEE JOINT, OPEN APPROACH: ICD-10-PCS | Performed by: ORTHOPAEDIC SURGERY

## 2018-08-27 PROCEDURE — 0KQR0ZZ REPAIR LEFT UPPER LEG MUSCLE, OPEN APPROACH: ICD-10-PCS | Performed by: ORTHOPAEDIC SURGERY

## 2018-08-28 VITALS — SYSTOLIC BLOOD PRESSURE: 131 MMHG | DIASTOLIC BLOOD PRESSURE: 56 MMHG

## 2018-08-28 VITALS — DIASTOLIC BLOOD PRESSURE: 52 MMHG | SYSTOLIC BLOOD PRESSURE: 120 MMHG

## 2018-08-28 VITALS — DIASTOLIC BLOOD PRESSURE: 51 MMHG | SYSTOLIC BLOOD PRESSURE: 100 MMHG

## 2018-08-28 VITALS — SYSTOLIC BLOOD PRESSURE: 127 MMHG | DIASTOLIC BLOOD PRESSURE: 49 MMHG

## 2018-08-28 LAB
ALBUMIN SERPL-MCNC: 1.8 G/DL (ref 3.4–5)
ALP SERPL-CCNC: 64 U/L (ref 46–116)
ALT SERPL-CCNC: 14 U/L (ref 10–68)
ANION GAP SERPL CALC-SCNC: 10.4 MMOL/L (ref 8–16)
BASOPHILS NFR BLD AUTO: 0.1 % (ref 0–2)
BILIRUB SERPL-MCNC: 0.17 MG/DL (ref 0.2–1.3)
BUN SERPL-MCNC: 14 MG/DL (ref 7–18)
CALCIUM SERPL-MCNC: 8.3 MG/DL (ref 8.5–10.1)
CHLORIDE SERPL-SCNC: 103 MMOL/L (ref 98–107)
CO2 SERPL-SCNC: 27 MMOL/L (ref 21–32)
CREAT SERPL-MCNC: 1 MG/DL (ref 0.6–1.3)
EOSINOPHIL NFR BLD: 0 % (ref 0–7)
ERYTHROCYTE [DISTWIDTH] IN BLOOD BY AUTOMATED COUNT: 17.1 % (ref 11.5–14.5)
GLOBULIN SER-MCNC: 3.5 G/L
GLUCOSE SERPL-MCNC: 126 MG/DL (ref 74–106)
HCT VFR BLD CALC: 28.5 % (ref 36–48)
HGB BLD-MCNC: 9.4 G/DL (ref 12–16)
IMM GRANULOCYTES NFR BLD: 1.8 % (ref 0–5)
LYMPHOCYTES NFR BLD AUTO: 4.9 % (ref 15–50)
MCH RBC QN AUTO: 27.2 PG (ref 26–34)
MCHC RBC AUTO-ENTMCNC: 33 G/DL (ref 31–37)
MCV RBC: 82.6 FL (ref 80–100)
MONOCYTES NFR BLD: 12 % (ref 2–11)
NEUTROPHILS NFR BLD AUTO: 81.2 % (ref 40–80)
OSMOLALITY SERPL CALC.SUM OF ELEC: 274 MOSM/KG (ref 275–300)
PLATELET # BLD: 396 10X3/UL (ref 130–400)
PMV BLD AUTO: 9 FL (ref 7.4–10.4)
POTASSIUM SERPL-SCNC: 4.4 MMOL/L (ref 3.5–5.1)
PROT SERPL-MCNC: 5.3 G/DL (ref 6.4–8.2)
RBC # BLD AUTO: 3.45 10X6/UL (ref 4–5.4)
SODIUM SERPL-SCNC: 136 MMOL/L (ref 136–145)
WBC # BLD AUTO: 17.6 10X3/UL (ref 4.8–10.8)

## 2018-08-29 VITALS — DIASTOLIC BLOOD PRESSURE: 50 MMHG | SYSTOLIC BLOOD PRESSURE: 123 MMHG

## 2018-08-29 VITALS — SYSTOLIC BLOOD PRESSURE: 158 MMHG | DIASTOLIC BLOOD PRESSURE: 64 MMHG

## 2018-08-29 VITALS — SYSTOLIC BLOOD PRESSURE: 110 MMHG | DIASTOLIC BLOOD PRESSURE: 41 MMHG

## 2018-08-29 VITALS — DIASTOLIC BLOOD PRESSURE: 46 MMHG | SYSTOLIC BLOOD PRESSURE: 111 MMHG

## 2018-08-29 LAB
ALBUMIN SERPL-MCNC: 1.6 G/DL (ref 3.4–5)
ALP SERPL-CCNC: 58 U/L (ref 46–116)
ALT SERPL-CCNC: 10 U/L (ref 10–68)
ANION GAP SERPL CALC-SCNC: 7.3 MMOL/L (ref 8–16)
BASOPHILS NFR BLD AUTO: 0.4 % (ref 0–2)
BILIRUB SERPL-MCNC: 0.11 MG/DL (ref 0.2–1.3)
BUN SERPL-MCNC: 19 MG/DL (ref 7–18)
CALCIUM SERPL-MCNC: 8 MG/DL (ref 8.5–10.1)
CHLORIDE SERPL-SCNC: 103 MMOL/L (ref 98–107)
CO2 SERPL-SCNC: 26.9 MMOL/L (ref 21–32)
CREAT SERPL-MCNC: 1.2 MG/DL (ref 0.6–1.3)
EOSINOPHIL NFR BLD: 2.1 % (ref 0–7)
ERYTHROCYTE [DISTWIDTH] IN BLOOD BY AUTOMATED COUNT: 17.3 % (ref 11.5–14.5)
ERYTHROCYTE [SEDIMENTATION RATE] IN BLOOD: 15 MM/HR (ref 0–30)
GLOBULIN SER-MCNC: 3.1 G/L
GLUCOSE SERPL-MCNC: 121 MG/DL (ref 74–106)
HCT VFR BLD CALC: 27.5 % (ref 36–48)
HGB BLD-MCNC: 9.3 G/DL (ref 12–16)
IMM GRANULOCYTES NFR BLD: 2.3 % (ref 0–5)
LYMPHOCYTES NFR BLD AUTO: 14.4 % (ref 15–50)
MCH RBC QN AUTO: 28.3 PG (ref 26–34)
MCHC RBC AUTO-ENTMCNC: 33.8 G/DL (ref 31–37)
MCV RBC: 83.6 FL (ref 80–100)
MONOCYTES NFR BLD: 11.6 % (ref 2–11)
NEUTROPHILS NFR BLD AUTO: 69.2 % (ref 40–80)
OSMOLALITY SERPL CALC.SUM OF ELEC: 268 MOSM/KG (ref 275–300)
PLATELET # BLD: 319 10X3/UL (ref 130–400)
PMV BLD AUTO: 9 FL (ref 7.4–10.4)
POTASSIUM SERPL-SCNC: 4.2 MMOL/L (ref 3.5–5.1)
PROT SERPL-MCNC: 4.7 G/DL (ref 6.4–8.2)
RBC # BLD AUTO: 3.29 10X6/UL (ref 4–5.4)
SODIUM SERPL-SCNC: 133 MMOL/L (ref 136–145)
WBC # BLD AUTO: 14 10X3/UL (ref 4.8–10.8)

## 2018-08-30 VITALS — SYSTOLIC BLOOD PRESSURE: 128 MMHG | DIASTOLIC BLOOD PRESSURE: 53 MMHG

## 2018-08-30 VITALS — DIASTOLIC BLOOD PRESSURE: 53 MMHG | SYSTOLIC BLOOD PRESSURE: 126 MMHG

## 2018-08-30 VITALS — DIASTOLIC BLOOD PRESSURE: 32 MMHG | SYSTOLIC BLOOD PRESSURE: 112 MMHG

## 2018-08-30 VITALS — SYSTOLIC BLOOD PRESSURE: 119 MMHG | DIASTOLIC BLOOD PRESSURE: 53 MMHG

## 2018-08-30 LAB
ALBUMIN SERPL-MCNC: 1.8 G/DL (ref 3.4–5)
ALP SERPL-CCNC: 66 U/L (ref 46–116)
ALT SERPL-CCNC: 11 U/L (ref 10–68)
ANION GAP SERPL CALC-SCNC: 8.7 MMOL/L (ref 8–16)
BASOPHILS NFR BLD AUTO: 0.2 % (ref 0–2)
BILIRUB SERPL-MCNC: 0.28 MG/DL (ref 0.2–1.3)
BUN SERPL-MCNC: 18 MG/DL (ref 7–18)
CALCIUM SERPL-MCNC: 8.4 MG/DL (ref 8.5–10.1)
CHLORIDE SERPL-SCNC: 100 MMOL/L (ref 98–107)
CO2 SERPL-SCNC: 28.5 MMOL/L (ref 21–32)
CREAT SERPL-MCNC: 1.2 MG/DL (ref 0.6–1.3)
EOSINOPHIL NFR BLD: 3.1 % (ref 0–7)
ERYTHROCYTE [DISTWIDTH] IN BLOOD BY AUTOMATED COUNT: 17.1 % (ref 11.5–14.5)
GLOBULIN SER-MCNC: 4 G/L
GLUCOSE SERPL-MCNC: 107 MG/DL (ref 74–106)
HCT VFR BLD CALC: 26.4 % (ref 36–48)
HGB BLD-MCNC: 8.8 G/DL (ref 12–16)
IMM GRANULOCYTES NFR BLD: 2.5 % (ref 0–5)
LYMPHOCYTES NFR BLD AUTO: 10.6 % (ref 15–50)
MCH RBC QN AUTO: 27.8 PG (ref 26–34)
MCHC RBC AUTO-ENTMCNC: 33.3 G/DL (ref 31–37)
MCV RBC: 83.5 FL (ref 80–100)
MONOCYTES NFR BLD: 8.1 % (ref 2–11)
NEUTROPHILS NFR BLD AUTO: 75.5 % (ref 40–80)
OSMOLALITY SERPL CALC.SUM OF ELEC: 267 MOSM/KG (ref 275–300)
PLATELET # BLD: 425 10X3/UL (ref 130–400)
PMV BLD AUTO: 9.2 FL (ref 7.4–10.4)
POTASSIUM SERPL-SCNC: 4.2 MMOL/L (ref 3.5–5.1)
PROT SERPL-MCNC: 5.8 G/DL (ref 6.4–8.2)
RBC # BLD AUTO: 3.16 10X6/UL (ref 4–5.4)
SODIUM SERPL-SCNC: 133 MMOL/L (ref 136–145)
WBC # BLD AUTO: 18.3 10X3/UL (ref 4.8–10.8)

## 2018-08-30 PROCEDURE — 02HV33Z INSERTION OF INFUSION DEVICE INTO SUPERIOR VENA CAVA, PERCUTANEOUS APPROACH: ICD-10-PCS | Performed by: RADIOLOGY

## 2018-08-30 PROCEDURE — B5181ZA FLUOROSCOPY OF SUPERIOR VENA CAVA USING LOW OSMOLAR CONTRAST, GUIDANCE: ICD-10-PCS | Performed by: RADIOLOGY

## 2018-08-31 VITALS — SYSTOLIC BLOOD PRESSURE: 118 MMHG | DIASTOLIC BLOOD PRESSURE: 48 MMHG

## 2018-08-31 VITALS — DIASTOLIC BLOOD PRESSURE: 51 MMHG | SYSTOLIC BLOOD PRESSURE: 119 MMHG

## 2018-08-31 VITALS — SYSTOLIC BLOOD PRESSURE: 128 MMHG | DIASTOLIC BLOOD PRESSURE: 47 MMHG

## 2018-08-31 LAB
ALBUMIN SERPL-MCNC: 1.8 G/DL (ref 3.4–5)
ALP SERPL-CCNC: 67 U/L (ref 46–116)
ALT SERPL-CCNC: 10 U/L (ref 10–68)
ANION GAP SERPL CALC-SCNC: 6.6 MMOL/L (ref 8–16)
BASOPHILS NFR BLD AUTO: 0.3 % (ref 0–2)
BILIRUB SERPL-MCNC: 0.25 MG/DL (ref 0.2–1.3)
BUN SERPL-MCNC: 18 MG/DL (ref 7–18)
CALCIUM SERPL-MCNC: 8.4 MG/DL (ref 8.5–10.1)
CHLORIDE SERPL-SCNC: 101 MMOL/L (ref 98–107)
CO2 SERPL-SCNC: 29.9 MMOL/L (ref 21–32)
CREAT SERPL-MCNC: 1.1 MG/DL (ref 0.6–1.3)
EOSINOPHIL NFR BLD: 4 % (ref 0–7)
ERYTHROCYTE [DISTWIDTH] IN BLOOD BY AUTOMATED COUNT: 17 % (ref 11.5–14.5)
GLOBULIN SER-MCNC: 3.8 G/L
GLUCOSE SERPL-MCNC: 116 MG/DL (ref 74–106)
HCT VFR BLD CALC: 24.9 % (ref 36–48)
HGB BLD-MCNC: 8.2 G/DL (ref 12–16)
IMM GRANULOCYTES NFR BLD: 2.2 % (ref 0–5)
LYMPHOCYTES NFR BLD AUTO: 10.4 % (ref 15–50)
MCH RBC QN AUTO: 27.4 PG (ref 26–34)
MCHC RBC AUTO-ENTMCNC: 32.9 G/DL (ref 31–37)
MCV RBC: 83.3 FL (ref 80–100)
MONOCYTES NFR BLD: 8.5 % (ref 2–11)
NEUTROPHILS NFR BLD AUTO: 74.6 % (ref 40–80)
OSMOLALITY SERPL CALC.SUM OF ELEC: 268 MOSM/KG (ref 275–300)
PLATELET # BLD: 416 10X3/UL (ref 130–400)
PMV BLD AUTO: 9.1 FL (ref 7.4–10.4)
POTASSIUM SERPL-SCNC: 4.5 MMOL/L (ref 3.5–5.1)
PROT SERPL-MCNC: 5.6 G/DL (ref 6.4–8.2)
RBC # BLD AUTO: 2.99 10X6/UL (ref 4–5.4)
SODIUM SERPL-SCNC: 133 MMOL/L (ref 136–145)
WBC # BLD AUTO: 15.9 10X3/UL (ref 4.8–10.8)

## 2018-10-15 ENCOUNTER — HOSPITAL ENCOUNTER (INPATIENT)
Dept: HOSPITAL 84 - D.MS | Age: 73
LOS: 9 days | Discharge: NURSING, REHAB OR LONG-TERM CARE | DRG: 467 | End: 2018-10-24
Attending: ORTHOPAEDIC SURGERY | Admitting: ORTHOPAEDIC SURGERY
Payer: MEDICARE

## 2018-10-15 VITALS
WEIGHT: 282 LBS | BODY MASS INDEX: 49.96 KG/M2 | BODY MASS INDEX: 49.96 KG/M2 | WEIGHT: 282 LBS | HEIGHT: 63 IN | HEIGHT: 63 IN | BODY MASS INDEX: 49.96 KG/M2 | BODY MASS INDEX: 49.96 KG/M2 | BODY MASS INDEX: 49.96 KG/M2

## 2018-10-15 VITALS — DIASTOLIC BLOOD PRESSURE: 38 MMHG | SYSTOLIC BLOOD PRESSURE: 127 MMHG

## 2018-10-15 VITALS — DIASTOLIC BLOOD PRESSURE: 61 MMHG | SYSTOLIC BLOOD PRESSURE: 134 MMHG

## 2018-10-15 DIAGNOSIS — N39.0: ICD-10-CM

## 2018-10-15 DIAGNOSIS — I11.0: ICD-10-CM

## 2018-10-15 DIAGNOSIS — S86.812A: ICD-10-CM

## 2018-10-15 DIAGNOSIS — G62.9: ICD-10-CM

## 2018-10-15 DIAGNOSIS — K59.09: ICD-10-CM

## 2018-10-15 DIAGNOSIS — B96.1: ICD-10-CM

## 2018-10-15 DIAGNOSIS — E66.01: ICD-10-CM

## 2018-10-15 DIAGNOSIS — F32.9: ICD-10-CM

## 2018-10-15 DIAGNOSIS — I50.9: ICD-10-CM

## 2018-10-15 DIAGNOSIS — J98.11: ICD-10-CM

## 2018-10-15 DIAGNOSIS — Z47.33: Primary | ICD-10-CM

## 2018-10-15 LAB
ANION GAP SERPL CALC-SCNC: 10.7 MMOL/L (ref 8–16)
APPEARANCE UR: (no result)
APTT BLD: 27.6 SECONDS (ref 22.8–39.4)
BACTERIA #/AREA URNS HPF: (no result) /HPF
BASOPHILS NFR BLD AUTO: 0.4 % (ref 0–2)
BILIRUB SERPL-MCNC: NEGATIVE MG/DL
BUN SERPL-MCNC: 15 MG/DL (ref 7–18)
CALCIUM SERPL-MCNC: 9.3 MG/DL (ref 8.5–10.1)
CHLORIDE SERPL-SCNC: 101 MMOL/L (ref 98–107)
CO2 SERPL-SCNC: 32.5 MMOL/L (ref 21–32)
COLOR UR: YELLOW
CREAT SERPL-MCNC: 1.3 MG/DL (ref 0.6–1.3)
EOSINOPHIL NFR BLD: 3.2 % (ref 0–7)
ERYTHROCYTE [DISTWIDTH] IN BLOOD BY AUTOMATED COUNT: 14.5 % (ref 11.5–14.5)
GLUCOSE SERPL-MCNC: 119 MG/DL (ref 74–106)
GLUCOSE SERPL-MCNC: NEGATIVE MG/DL
HCT VFR BLD CALC: 34.2 % (ref 36–48)
HGB BLD-MCNC: 11.4 G/DL (ref 12–16)
IMM GRANULOCYTES NFR BLD: 1 % (ref 0–5)
INR PPP: 1.15 (ref 0.85–1.17)
KETONES UR STRIP-MCNC: NEGATIVE MG/DL
LYMPHOCYTES NFR BLD AUTO: 16.7 % (ref 15–50)
MCH RBC QN AUTO: 28.1 PG (ref 26–34)
MCHC RBC AUTO-ENTMCNC: 33.3 G/DL (ref 31–37)
MCV RBC: 84.2 FL (ref 80–100)
MONOCYTES NFR BLD: 7.9 % (ref 2–11)
MUCOUS THREADS #/AREA URNS LPF: (no result) /LPF
NEUTROPHILS NFR BLD AUTO: 70.8 % (ref 40–80)
NITRITE UR-MCNC: POSITIVE MG/ML
OSMOLALITY SERPL CALC.SUM OF ELEC: 280 MOSM/KG (ref 275–300)
PH UR STRIP: 9 [PH] (ref 5–6)
PLATELET # BLD: 351 10X3/UL (ref 130–400)
PMV BLD AUTO: 9.5 FL (ref 7.4–10.4)
POTASSIUM SERPL-SCNC: 4.2 MMOL/L (ref 3.5–5.1)
PROT UR-MCNC: (no result) MG/DL
PROTHROMBIN TIME: 14.3 SECONDS (ref 11.6–15)
RBC # BLD AUTO: 4.06 10X6/UL (ref 4–5.4)
RBC #/AREA URNS HPF: (no result) /HPF (ref 0–5)
SODIUM SERPL-SCNC: 140 MMOL/L (ref 136–145)
SP GR UR STRIP: 1 (ref 1–1.02)
SQUAMOUS #/AREA URNS HPF: (no result) /HPF (ref 0–5)
UROBILINOGEN UR-MCNC: 1 MG/DL
WBC # BLD AUTO: 10.6 10X3/UL (ref 4.8–10.8)
WBC #/AREA URNS HPF: >50 /HPF (ref 0–5)

## 2018-10-16 VITALS — SYSTOLIC BLOOD PRESSURE: 121 MMHG | DIASTOLIC BLOOD PRESSURE: 47 MMHG

## 2018-10-16 VITALS — SYSTOLIC BLOOD PRESSURE: 147 MMHG | DIASTOLIC BLOOD PRESSURE: 57 MMHG

## 2018-10-16 VITALS — SYSTOLIC BLOOD PRESSURE: 116 MMHG | DIASTOLIC BLOOD PRESSURE: 40 MMHG

## 2018-10-16 VITALS — DIASTOLIC BLOOD PRESSURE: 55 MMHG | SYSTOLIC BLOOD PRESSURE: 128 MMHG

## 2018-10-16 VITALS — SYSTOLIC BLOOD PRESSURE: 133 MMHG | DIASTOLIC BLOOD PRESSURE: 63 MMHG

## 2018-10-16 LAB
ALBUMIN SERPL-MCNC: 2.2 G/DL (ref 3.4–5)
ALP SERPL-CCNC: 105 U/L (ref 46–116)
ALT SERPL-CCNC: 9 U/L (ref 10–68)
ANION GAP SERPL CALC-SCNC: 10.6 MMOL/L (ref 8–16)
BASOPHILS NFR BLD AUTO: 0.4 % (ref 0–2)
BILIRUB SERPL-MCNC: 0.14 MG/DL (ref 0.2–1.3)
BUN SERPL-MCNC: 15 MG/DL (ref 7–18)
CALCIUM SERPL-MCNC: 8.5 MG/DL (ref 8.5–10.1)
CHLORIDE SERPL-SCNC: 103 MMOL/L (ref 98–107)
CO2 SERPL-SCNC: 30.3 MMOL/L (ref 21–32)
CREAT SERPL-MCNC: 1.3 MG/DL (ref 0.6–1.3)
EOSINOPHIL NFR BLD: 3.8 % (ref 0–7)
ERYTHROCYTE [DISTWIDTH] IN BLOOD BY AUTOMATED COUNT: 14.4 % (ref 11.5–14.5)
GLOBULIN SER-MCNC: 3.3 G/L
GLUCOSE SERPL-MCNC: 139 MG/DL (ref 74–106)
HCT VFR BLD CALC: 29 % (ref 36–48)
HGB BLD-MCNC: 9.5 G/DL (ref 12–16)
IMM GRANULOCYTES NFR BLD: 0.8 % (ref 0–5)
LYMPHOCYTES NFR BLD AUTO: 23.2 % (ref 15–50)
MCH RBC QN AUTO: 27.8 PG (ref 26–34)
MCHC RBC AUTO-ENTMCNC: 32.8 G/DL (ref 31–37)
MCV RBC: 84.8 FL (ref 80–100)
MONOCYTES NFR BLD: 9.3 % (ref 2–11)
NEUTROPHILS NFR BLD AUTO: 62.5 % (ref 40–80)
OSMOLALITY SERPL CALC.SUM OF ELEC: 281 MOSM/KG (ref 275–300)
PLATELET # BLD: 275 10X3/UL (ref 130–400)
PMV BLD AUTO: 9.6 FL (ref 7.4–10.4)
POTASSIUM SERPL-SCNC: 3.9 MMOL/L (ref 3.5–5.1)
PROT SERPL-MCNC: 5.5 G/DL (ref 6.4–8.2)
RBC # BLD AUTO: 3.42 10X6/UL (ref 4–5.4)
SODIUM SERPL-SCNC: 140 MMOL/L (ref 136–145)
WBC # BLD AUTO: 7.9 10X3/UL (ref 4.8–10.8)

## 2018-10-17 VITALS — DIASTOLIC BLOOD PRESSURE: 58 MMHG | SYSTOLIC BLOOD PRESSURE: 138 MMHG

## 2018-10-17 VITALS — SYSTOLIC BLOOD PRESSURE: 117 MMHG | DIASTOLIC BLOOD PRESSURE: 84 MMHG

## 2018-10-17 VITALS — SYSTOLIC BLOOD PRESSURE: 113 MMHG | DIASTOLIC BLOOD PRESSURE: 49 MMHG

## 2018-10-17 VITALS — DIASTOLIC BLOOD PRESSURE: 47 MMHG | SYSTOLIC BLOOD PRESSURE: 119 MMHG

## 2018-10-17 VITALS — SYSTOLIC BLOOD PRESSURE: 119 MMHG | DIASTOLIC BLOOD PRESSURE: 47 MMHG

## 2018-10-17 LAB
ALBUMIN SERPL-MCNC: 2.2 G/DL (ref 3.4–5)
ALP SERPL-CCNC: 100 U/L (ref 46–116)
ALT SERPL-CCNC: 11 U/L (ref 10–68)
ANION GAP SERPL CALC-SCNC: 9.2 MMOL/L (ref 8–16)
BASOPHILS NFR BLD AUTO: 0.2 % (ref 0–2)
BILIRUB SERPL-MCNC: 0.19 MG/DL (ref 0.2–1.3)
BUN SERPL-MCNC: 14 MG/DL (ref 7–18)
CALCIUM SERPL-MCNC: 8.5 MG/DL (ref 8.5–10.1)
CHLORIDE SERPL-SCNC: 102 MMOL/L (ref 98–107)
CO2 SERPL-SCNC: 30.3 MMOL/L (ref 21–32)
CREAT SERPL-MCNC: 1.3 MG/DL (ref 0.6–1.3)
EOSINOPHIL NFR BLD: 5.2 % (ref 0–7)
ERYTHROCYTE [DISTWIDTH] IN BLOOD BY AUTOMATED COUNT: 14.4 % (ref 11.5–14.5)
GLOBULIN SER-MCNC: 3.5 G/L
GLUCOSE SERPL-MCNC: 119 MG/DL (ref 74–106)
HCT VFR BLD CALC: 29.5 % (ref 36–48)
HGB BLD-MCNC: 9.7 G/DL (ref 12–16)
IMM GRANULOCYTES NFR BLD: 0.6 % (ref 0–5)
LYMPHOCYTES NFR BLD AUTO: 25.8 % (ref 15–50)
MCH RBC QN AUTO: 27.6 PG (ref 26–34)
MCHC RBC AUTO-ENTMCNC: 32.9 G/DL (ref 31–37)
MCV RBC: 84 FL (ref 80–100)
MONOCYTES NFR BLD: 8.6 % (ref 2–11)
NEUTROPHILS NFR BLD AUTO: 59.6 % (ref 40–80)
OSMOLALITY SERPL CALC.SUM OF ELEC: 277 MOSM/KG (ref 275–300)
PLATELET # BLD: 292 10X3/UL (ref 130–400)
PMV BLD AUTO: 9.7 FL (ref 7.4–10.4)
POTASSIUM SERPL-SCNC: 3.5 MMOL/L (ref 3.5–5.1)
PROT SERPL-MCNC: 5.7 G/DL (ref 6.4–8.2)
RBC # BLD AUTO: 3.51 10X6/UL (ref 4–5.4)
SODIUM SERPL-SCNC: 138 MMOL/L (ref 136–145)
WBC # BLD AUTO: 9.3 10X3/UL (ref 4.8–10.8)

## 2018-10-18 VITALS — DIASTOLIC BLOOD PRESSURE: 74 MMHG | SYSTOLIC BLOOD PRESSURE: 154 MMHG

## 2018-10-18 VITALS — SYSTOLIC BLOOD PRESSURE: 121 MMHG | DIASTOLIC BLOOD PRESSURE: 61 MMHG

## 2018-10-18 VITALS — DIASTOLIC BLOOD PRESSURE: 46 MMHG | SYSTOLIC BLOOD PRESSURE: 117 MMHG

## 2018-10-18 VITALS — SYSTOLIC BLOOD PRESSURE: 124 MMHG | DIASTOLIC BLOOD PRESSURE: 64 MMHG

## 2018-10-18 VITALS — SYSTOLIC BLOOD PRESSURE: 117 MMHG | DIASTOLIC BLOOD PRESSURE: 51 MMHG

## 2018-10-18 VITALS — DIASTOLIC BLOOD PRESSURE: 54 MMHG | SYSTOLIC BLOOD PRESSURE: 133 MMHG

## 2018-10-18 LAB
ALBUMIN SERPL-MCNC: 2.2 G/DL (ref 3.4–5)
ALP SERPL-CCNC: 90 U/L (ref 46–116)
ALT SERPL-CCNC: 11 U/L (ref 10–68)
ANION GAP SERPL CALC-SCNC: 11.4 MMOL/L (ref 8–16)
APPEARANCE UR: CLEAR
BASOPHILS NFR BLD AUTO: 0.4 % (ref 0–2)
BILIRUB SERPL-MCNC: 0.15 MG/DL (ref 0.2–1.3)
BILIRUB SERPL-MCNC: NEGATIVE MG/DL
BUN SERPL-MCNC: 17 MG/DL (ref 7–18)
CALCIUM SERPL-MCNC: 9 MG/DL (ref 8.5–10.1)
CHLORIDE SERPL-SCNC: 102 MMOL/L (ref 98–107)
CO2 SERPL-SCNC: 29.6 MMOL/L (ref 21–32)
COLOR UR: COLORLESS
CREAT SERPL-MCNC: 1.1 MG/DL (ref 0.6–1.3)
EOSINOPHIL NFR BLD: 5.7 % (ref 0–7)
ERYTHROCYTE [DISTWIDTH] IN BLOOD BY AUTOMATED COUNT: 14.4 % (ref 11.5–14.5)
GLOBULIN SER-MCNC: 3.6 G/L
GLUCOSE SERPL-MCNC: 95 MG/DL (ref 74–106)
GLUCOSE SERPL-MCNC: NEGATIVE MG/DL
HCT VFR BLD CALC: 30.2 % (ref 36–48)
HGB BLD-MCNC: 10 G/DL (ref 12–16)
IMM GRANULOCYTES NFR BLD: 1.3 % (ref 0–5)
KETONES UR STRIP-MCNC: NEGATIVE MG/DL
LYMPHOCYTES NFR BLD AUTO: 23.4 % (ref 15–50)
MCH RBC QN AUTO: 27.9 PG (ref 26–34)
MCHC RBC AUTO-ENTMCNC: 33.1 G/DL (ref 31–37)
MCV RBC: 84.1 FL (ref 80–100)
MONOCYTES NFR BLD: 8.9 % (ref 2–11)
NEUTROPHILS NFR BLD AUTO: 60.3 % (ref 40–80)
NITRITE UR-MCNC: NEGATIVE MG/ML
OSMOLALITY SERPL CALC.SUM OF ELEC: 279 MOSM/KG (ref 275–300)
PH UR STRIP: 7 [PH] (ref 5–6)
PLATELET # BLD: 297 10X3/UL (ref 130–400)
PMV BLD AUTO: 9.7 FL (ref 7.4–10.4)
POTASSIUM SERPL-SCNC: 4 MMOL/L (ref 3.5–5.1)
PROT SERPL-MCNC: 5.8 G/DL (ref 6.4–8.2)
PROT UR-MCNC: NEGATIVE MG/DL
RBC # BLD AUTO: 3.59 10X6/UL (ref 4–5.4)
RBC #/AREA URNS HPF: (no result) /HPF (ref 0–5)
SODIUM SERPL-SCNC: 139 MMOL/L (ref 136–145)
SP GR UR STRIP: 1.01 (ref 1–1.02)
UROBILINOGEN UR-MCNC: NORMAL MG/DL
WBC # BLD AUTO: 9.5 10X3/UL (ref 4.8–10.8)
WBC #/AREA URNS HPF: (no result) /HPF (ref 0–5)

## 2018-10-19 VITALS — SYSTOLIC BLOOD PRESSURE: 121 MMHG | DIASTOLIC BLOOD PRESSURE: 54 MMHG

## 2018-10-19 VITALS — SYSTOLIC BLOOD PRESSURE: 103 MMHG | DIASTOLIC BLOOD PRESSURE: 63 MMHG

## 2018-10-19 LAB
ALBUMIN SERPL-MCNC: 2.2 G/DL (ref 3.4–5)
ALP SERPL-CCNC: 89 U/L (ref 46–116)
ALT SERPL-CCNC: 9 U/L (ref 10–68)
ANION GAP SERPL CALC-SCNC: 9.4 MMOL/L (ref 8–16)
BASOPHILS NFR BLD AUTO: 0.5 % (ref 0–2)
BILIRUB SERPL-MCNC: 0.22 MG/DL (ref 0.2–1.3)
BUN SERPL-MCNC: 19 MG/DL (ref 7–18)
CALCIUM SERPL-MCNC: 8.9 MG/DL (ref 8.5–10.1)
CHLORIDE SERPL-SCNC: 102 MMOL/L (ref 98–107)
CO2 SERPL-SCNC: 30.6 MMOL/L (ref 21–32)
CREAT SERPL-MCNC: 1.1 MG/DL (ref 0.6–1.3)
EOSINOPHIL NFR BLD: 5.8 % (ref 0–7)
ERYTHROCYTE [DISTWIDTH] IN BLOOD BY AUTOMATED COUNT: 14.6 % (ref 11.5–14.5)
GLOBULIN SER-MCNC: 3.5 G/L
GLUCOSE SERPL-MCNC: 99 MG/DL (ref 74–106)
HCT VFR BLD CALC: 30.6 % (ref 36–48)
HCT VFR BLD CALC: 31.3 % (ref 36–48)
HGB BLD-MCNC: 10.1 G/DL (ref 12–16)
HGB BLD-MCNC: 10.7 G/DL (ref 12–16)
IMM GRANULOCYTES NFR BLD: 1.2 % (ref 0–5)
LYMPHOCYTES NFR BLD AUTO: 22.8 % (ref 15–50)
MCH RBC QN AUTO: 27.7 PG (ref 26–34)
MCHC RBC AUTO-ENTMCNC: 33 G/DL (ref 31–37)
MCV RBC: 84.1 FL (ref 80–100)
MONOCYTES NFR BLD: 8.5 % (ref 2–11)
NEUTROPHILS NFR BLD AUTO: 61.2 % (ref 40–80)
OSMOLALITY SERPL CALC.SUM OF ELEC: 277 MOSM/KG (ref 275–300)
PLATELET # BLD: 277 10X3/UL (ref 130–400)
PMV BLD AUTO: 9.4 FL (ref 7.4–10.4)
POTASSIUM SERPL-SCNC: 4 MMOL/L (ref 3.5–5.1)
PROT SERPL-MCNC: 5.7 G/DL (ref 6.4–8.2)
RBC # BLD AUTO: 3.64 10X6/UL (ref 4–5.4)
SODIUM SERPL-SCNC: 138 MMOL/L (ref 136–145)
WBC # BLD AUTO: 9.5 10X3/UL (ref 4.8–10.8)

## 2018-10-19 PROCEDURE — 0SPD0JZ REMOVAL OF SYNTHETIC SUBSTITUTE FROM LEFT KNEE JOINT, OPEN APPROACH: ICD-10-PCS | Performed by: ORTHOPAEDIC SURGERY

## 2018-10-19 PROCEDURE — 0LQR0ZZ REPAIR LEFT KNEE TENDON, OPEN APPROACH: ICD-10-PCS | Performed by: ORTHOPAEDIC SURGERY

## 2018-10-19 PROCEDURE — 0SRD0J9 REPLACEMENT OF LEFT KNEE JOINT WITH SYNTHETIC SUBSTITUTE, CEMENTED, OPEN APPROACH: ICD-10-PCS | Performed by: ORTHOPAEDIC SURGERY

## 2018-10-20 VITALS — SYSTOLIC BLOOD PRESSURE: 108 MMHG | DIASTOLIC BLOOD PRESSURE: 47 MMHG

## 2018-10-20 VITALS — SYSTOLIC BLOOD PRESSURE: 96 MMHG | DIASTOLIC BLOOD PRESSURE: 47 MMHG

## 2018-10-20 VITALS — DIASTOLIC BLOOD PRESSURE: 48 MMHG | SYSTOLIC BLOOD PRESSURE: 101 MMHG

## 2018-10-20 VITALS — SYSTOLIC BLOOD PRESSURE: 108 MMHG | DIASTOLIC BLOOD PRESSURE: 42 MMHG

## 2018-10-20 VITALS — SYSTOLIC BLOOD PRESSURE: 109 MMHG | DIASTOLIC BLOOD PRESSURE: 42 MMHG

## 2018-10-20 LAB
ALBUMIN SERPL-MCNC: 2 G/DL (ref 3.4–5)
ALP SERPL-CCNC: 89 U/L (ref 46–116)
ALT SERPL-CCNC: 17 U/L (ref 10–68)
ANION GAP SERPL CALC-SCNC: 13.4 MMOL/L (ref 8–16)
BASOPHILS NFR BLD AUTO: 0.1 % (ref 0–2)
BILIRUB SERPL-MCNC: 0.13 MG/DL (ref 0.2–1.3)
BUN SERPL-MCNC: 33 MG/DL (ref 7–18)
CALCIUM SERPL-MCNC: 8.5 MG/DL (ref 8.5–10.1)
CHLORIDE SERPL-SCNC: 100 MMOL/L (ref 98–107)
CO2 SERPL-SCNC: 24.4 MMOL/L (ref 21–32)
CREAT SERPL-MCNC: 1.5 MG/DL (ref 0.6–1.3)
EOSINOPHIL NFR BLD: 0 % (ref 0–7)
ERYTHROCYTE [DISTWIDTH] IN BLOOD BY AUTOMATED COUNT: 14.8 % (ref 11.5–14.5)
GLOBULIN SER-MCNC: 3.2 G/L
GLUCOSE SERPL-MCNC: 134 MG/DL (ref 74–106)
HCT VFR BLD CALC: 22.8 % (ref 36–48)
HGB BLD-MCNC: 7.7 G/DL (ref 12–16)
IMM GRANULOCYTES NFR BLD: 0.9 % (ref 0–5)
LYMPHOCYTES NFR BLD AUTO: 6.9 % (ref 15–50)
MCH RBC QN AUTO: 28.4 PG (ref 26–34)
MCHC RBC AUTO-ENTMCNC: 33.8 G/DL (ref 31–37)
MCV RBC: 84.1 FL (ref 80–100)
MONOCYTES NFR BLD: 8.9 % (ref 2–11)
NEUTROPHILS NFR BLD AUTO: 83.2 % (ref 40–80)
OSMOLALITY SERPL CALC.SUM OF ELEC: 274 MOSM/KG (ref 275–300)
PLATELET # BLD: 242 10X3/UL (ref 130–400)
PMV BLD AUTO: 9.5 FL (ref 7.4–10.4)
POTASSIUM SERPL-SCNC: 4.8 MMOL/L (ref 3.5–5.1)
PROT SERPL-MCNC: 5.2 G/DL (ref 6.4–8.2)
RBC # BLD AUTO: 2.71 10X6/UL (ref 4–5.4)
SODIUM SERPL-SCNC: 133 MMOL/L (ref 136–145)
WBC # BLD AUTO: 14.9 10X3/UL (ref 4.8–10.8)

## 2018-10-21 VITALS — DIASTOLIC BLOOD PRESSURE: 49 MMHG | SYSTOLIC BLOOD PRESSURE: 102 MMHG

## 2018-10-21 VITALS — SYSTOLIC BLOOD PRESSURE: 108 MMHG | DIASTOLIC BLOOD PRESSURE: 42 MMHG

## 2018-10-21 VITALS — DIASTOLIC BLOOD PRESSURE: 45 MMHG | SYSTOLIC BLOOD PRESSURE: 105 MMHG

## 2018-10-21 VITALS — SYSTOLIC BLOOD PRESSURE: 114 MMHG | DIASTOLIC BLOOD PRESSURE: 43 MMHG

## 2018-10-21 VITALS — DIASTOLIC BLOOD PRESSURE: 42 MMHG | SYSTOLIC BLOOD PRESSURE: 121 MMHG

## 2018-10-21 LAB
ERYTHROCYTE [DISTWIDTH] IN BLOOD BY AUTOMATED COUNT: 15.1 % (ref 11.5–14.5)
HCT VFR BLD CALC: 22.1 % (ref 36–48)
HGB BLD-MCNC: 7.5 G/DL (ref 12–16)
MCH RBC QN AUTO: 28.3 PG (ref 26–34)
MCHC RBC AUTO-ENTMCNC: 33.9 G/DL (ref 31–37)
MCV RBC: 83.4 FL (ref 80–100)
PLATELET # BLD: 172 10X3/UL (ref 130–400)
PMV BLD AUTO: 10 FL (ref 7.4–10.4)
RBC # BLD AUTO: 2.65 10X6/UL (ref 4–5.4)
WBC # BLD AUTO: 10.9 10X3/UL (ref 4.8–10.8)

## 2018-10-22 VITALS — DIASTOLIC BLOOD PRESSURE: 48 MMHG | SYSTOLIC BLOOD PRESSURE: 144 MMHG

## 2018-10-22 VITALS — SYSTOLIC BLOOD PRESSURE: 125 MMHG | DIASTOLIC BLOOD PRESSURE: 42 MMHG

## 2018-10-22 VITALS — SYSTOLIC BLOOD PRESSURE: 116 MMHG | DIASTOLIC BLOOD PRESSURE: 47 MMHG

## 2018-10-22 VITALS — DIASTOLIC BLOOD PRESSURE: 51 MMHG | SYSTOLIC BLOOD PRESSURE: 129 MMHG

## 2018-10-22 VITALS — DIASTOLIC BLOOD PRESSURE: 46 MMHG | SYSTOLIC BLOOD PRESSURE: 123 MMHG

## 2018-10-22 VITALS — SYSTOLIC BLOOD PRESSURE: 135 MMHG | DIASTOLIC BLOOD PRESSURE: 48 MMHG

## 2018-10-22 LAB
ANION GAP SERPL CALC-SCNC: 10.6 MMOL/L (ref 8–16)
BASOPHILS NFR BLD AUTO: 0.4 % (ref 0–2)
BUN SERPL-MCNC: 29 MG/DL (ref 7–18)
CALCIUM SERPL-MCNC: 8.6 MG/DL (ref 8.5–10.1)
CHLORIDE SERPL-SCNC: 103 MMOL/L (ref 98–107)
CO2 SERPL-SCNC: 26.8 MMOL/L (ref 21–32)
CREAT SERPL-MCNC: 0.9 MG/DL (ref 0.6–1.3)
EOSINOPHIL NFR BLD: 3.9 % (ref 0–7)
ERYTHROCYTE [DISTWIDTH] IN BLOOD BY AUTOMATED COUNT: 16 % (ref 11.5–14.5)
GLUCOSE SERPL-MCNC: 108 MG/DL (ref 74–106)
HCT VFR BLD CALC: 24.6 % (ref 36–48)
HGB BLD-MCNC: 8.2 G/DL (ref 12–16)
IMM GRANULOCYTES NFR BLD: 2 % (ref 0–5)
LYMPHOCYTES NFR BLD AUTO: 15.9 % (ref 15–50)
MCH RBC QN AUTO: 27.5 PG (ref 26–34)
MCHC RBC AUTO-ENTMCNC: 33.3 G/DL (ref 31–37)
MCV RBC: 82.6 FL (ref 80–100)
MONOCYTES NFR BLD: 12.1 % (ref 2–11)
NEUTROPHILS NFR BLD AUTO: 65.7 % (ref 40–80)
OSMOLALITY SERPL CALC.SUM OF ELEC: 278 MOSM/KG (ref 275–300)
PLATELET # BLD: 165 10X3/UL (ref 130–400)
PMV BLD AUTO: 9.2 FL (ref 7.4–10.4)
POTASSIUM SERPL-SCNC: 4.4 MMOL/L (ref 3.5–5.1)
RBC # BLD AUTO: 2.98 10X6/UL (ref 4–5.4)
SODIUM SERPL-SCNC: 136 MMOL/L (ref 136–145)
WBC # BLD AUTO: 9.5 10X3/UL (ref 4.8–10.8)

## 2018-10-23 VITALS — SYSTOLIC BLOOD PRESSURE: 135 MMHG | DIASTOLIC BLOOD PRESSURE: 59 MMHG

## 2018-10-23 VITALS — DIASTOLIC BLOOD PRESSURE: 60 MMHG | SYSTOLIC BLOOD PRESSURE: 134 MMHG

## 2018-10-23 VITALS — DIASTOLIC BLOOD PRESSURE: 55 MMHG | SYSTOLIC BLOOD PRESSURE: 137 MMHG

## 2018-10-23 VITALS — SYSTOLIC BLOOD PRESSURE: 141 MMHG | DIASTOLIC BLOOD PRESSURE: 53 MMHG

## 2018-10-23 VITALS — DIASTOLIC BLOOD PRESSURE: 55 MMHG | SYSTOLIC BLOOD PRESSURE: 126 MMHG

## 2018-10-23 LAB
ANION GAP SERPL CALC-SCNC: 10.9 MMOL/L (ref 8–16)
BASOPHILS NFR BLD AUTO: 0.3 % (ref 0–2)
BUN SERPL-MCNC: 22 MG/DL (ref 7–18)
CALCIUM SERPL-MCNC: 8.7 MG/DL (ref 8.5–10.1)
CHLORIDE SERPL-SCNC: 102 MMOL/L (ref 98–107)
CO2 SERPL-SCNC: 28.1 MMOL/L (ref 21–32)
CREAT SERPL-MCNC: 0.8 MG/DL (ref 0.6–1.3)
EOSINOPHIL NFR BLD: 4 % (ref 0–7)
ERYTHROCYTE [DISTWIDTH] IN BLOOD BY AUTOMATED COUNT: 15.6 % (ref 11.5–14.5)
GLUCOSE SERPL-MCNC: 110 MG/DL (ref 74–106)
HCT VFR BLD CALC: 25.4 % (ref 36–48)
HGB BLD-MCNC: 8.3 G/DL (ref 12–16)
IMM GRANULOCYTES NFR BLD: 2.3 % (ref 0–5)
LYMPHOCYTES NFR BLD AUTO: 15.9 % (ref 15–50)
MCH RBC QN AUTO: 27.5 PG (ref 26–34)
MCHC RBC AUTO-ENTMCNC: 32.7 G/DL (ref 31–37)
MCV RBC: 84.1 FL (ref 80–100)
MONOCYTES NFR BLD: 11.1 % (ref 2–11)
NEUTROPHILS NFR BLD AUTO: 66.4 % (ref 40–80)
OSMOLALITY SERPL CALC.SUM OF ELEC: 277 MOSM/KG (ref 275–300)
PLATELET # BLD: 204 10X3/UL (ref 130–400)
PMV BLD AUTO: 9.4 FL (ref 7.4–10.4)
POTASSIUM SERPL-SCNC: 4 MMOL/L (ref 3.5–5.1)
RBC # BLD AUTO: 3.02 10X6/UL (ref 4–5.4)
SODIUM SERPL-SCNC: 137 MMOL/L (ref 136–145)
WBC # BLD AUTO: 9.6 10X3/UL (ref 4.8–10.8)

## 2018-10-24 ENCOUNTER — HOSPITAL ENCOUNTER (INPATIENT)
Dept: HOSPITAL 84 - D.REHAB | Age: 73
LOS: 16 days | Discharge: NURSING, REHAB OR LONG-TERM CARE | DRG: 92 | End: 2018-11-09
Attending: FAMILY MEDICINE | Admitting: FAMILY MEDICINE
Payer: MEDICARE

## 2018-10-24 VITALS
BODY MASS INDEX: 49.96 KG/M2 | BODY MASS INDEX: 49.96 KG/M2 | HEIGHT: 63 IN | WEIGHT: 282 LBS | BODY MASS INDEX: 49.96 KG/M2 | BODY MASS INDEX: 49.96 KG/M2 | HEIGHT: 63 IN | WEIGHT: 282 LBS

## 2018-10-24 VITALS — DIASTOLIC BLOOD PRESSURE: 59 MMHG | SYSTOLIC BLOOD PRESSURE: 135 MMHG

## 2018-10-24 VITALS — DIASTOLIC BLOOD PRESSURE: 54 MMHG | SYSTOLIC BLOOD PRESSURE: 129 MMHG

## 2018-10-24 VITALS — SYSTOLIC BLOOD PRESSURE: 119 MMHG | DIASTOLIC BLOOD PRESSURE: 50 MMHG

## 2018-10-24 VITALS — SYSTOLIC BLOOD PRESSURE: 133 MMHG | DIASTOLIC BLOOD PRESSURE: 60 MMHG

## 2018-10-24 DIAGNOSIS — I10: ICD-10-CM

## 2018-10-24 DIAGNOSIS — Z96.652: ICD-10-CM

## 2018-10-24 DIAGNOSIS — N39.0: ICD-10-CM

## 2018-10-24 DIAGNOSIS — D62: ICD-10-CM

## 2018-10-24 DIAGNOSIS — R05: ICD-10-CM

## 2018-10-24 DIAGNOSIS — E66.01: ICD-10-CM

## 2018-10-24 DIAGNOSIS — F32.9: ICD-10-CM

## 2018-10-24 DIAGNOSIS — D50.9: ICD-10-CM

## 2018-10-24 DIAGNOSIS — G72.89: Primary | ICD-10-CM

## 2018-10-24 DIAGNOSIS — B96.1: ICD-10-CM

## 2018-10-24 DIAGNOSIS — K59.09: ICD-10-CM

## 2018-10-24 DIAGNOSIS — I50.9: ICD-10-CM

## 2018-10-24 DIAGNOSIS — S76.199D: ICD-10-CM

## 2018-10-24 DIAGNOSIS — G62.9: ICD-10-CM

## 2018-10-24 LAB
ANION GAP SERPL CALC-SCNC: 9.3 MMOL/L (ref 8–16)
BASOPHILS NFR BLD AUTO: 0.3 % (ref 0–2)
BUN SERPL-MCNC: 18 MG/DL (ref 7–18)
CALCIUM SERPL-MCNC: 9.2 MG/DL (ref 8.5–10.1)
CHLORIDE SERPL-SCNC: 102 MMOL/L (ref 98–107)
CO2 SERPL-SCNC: 30.8 MMOL/L (ref 21–32)
CREAT SERPL-MCNC: 0.9 MG/DL (ref 0.6–1.3)
EOSINOPHIL NFR BLD: 5.2 % (ref 0–7)
ERYTHROCYTE [DISTWIDTH] IN BLOOD BY AUTOMATED COUNT: 15.4 % (ref 11.5–14.5)
GLUCOSE SERPL-MCNC: 100 MG/DL (ref 74–106)
HCT VFR BLD CALC: 26 % (ref 36–48)
HGB BLD-MCNC: 8.5 G/DL (ref 12–16)
IMM GRANULOCYTES NFR BLD: 3.5 % (ref 0–5)
LYMPHOCYTES NFR BLD AUTO: 18.3 % (ref 15–50)
MCH RBC QN AUTO: 27.5 PG (ref 26–34)
MCHC RBC AUTO-ENTMCNC: 32.7 G/DL (ref 31–37)
MCV RBC: 84.1 FL (ref 80–100)
MONOCYTES NFR BLD: 10.4 % (ref 2–11)
NEUTROPHILS NFR BLD AUTO: 62.3 % (ref 40–80)
OSMOLALITY SERPL CALC.SUM OF ELEC: 277 MOSM/KG (ref 275–300)
PLATELET # BLD: 248 10X3/UL (ref 130–400)
PMV BLD AUTO: 9.5 FL (ref 7.4–10.4)
POTASSIUM SERPL-SCNC: 4.1 MMOL/L (ref 3.5–5.1)
RBC # BLD AUTO: 3.09 10X6/UL (ref 4–5.4)
SODIUM SERPL-SCNC: 138 MMOL/L (ref 136–145)
WBC # BLD AUTO: 11.1 10X3/UL (ref 4.8–10.8)

## 2018-10-25 VITALS — DIASTOLIC BLOOD PRESSURE: 48 MMHG | SYSTOLIC BLOOD PRESSURE: 117 MMHG

## 2018-10-25 VITALS — SYSTOLIC BLOOD PRESSURE: 109 MMHG | DIASTOLIC BLOOD PRESSURE: 46 MMHG

## 2018-10-25 LAB
ANION GAP SERPL CALC-SCNC: 7.8 MMOL/L (ref 8–16)
APPEARANCE UR: (no result)
BACTERIA #/AREA URNS HPF: (no result) /HPF
BASOPHILS NFR BLD AUTO: 0.6 % (ref 0–2)
BILIRUB SERPL-MCNC: NEGATIVE MG/DL
BUN SERPL-MCNC: 15 MG/DL (ref 7–18)
CALCIUM SERPL-MCNC: 9 MG/DL (ref 8.5–10.1)
CHLORIDE SERPL-SCNC: 102 MMOL/L (ref 98–107)
CO2 SERPL-SCNC: 33.1 MMOL/L (ref 21–32)
COLOR UR: YELLOW
CREAT SERPL-MCNC: 0.9 MG/DL (ref 0.6–1.3)
EOSINOPHIL NFR BLD: 5.5 % (ref 0–7)
ERYTHROCYTE [DISTWIDTH] IN BLOOD BY AUTOMATED COUNT: 15.2 % (ref 11.5–14.5)
GLUCOSE SERPL-MCNC: 103 MG/DL (ref 74–106)
GLUCOSE SERPL-MCNC: NEGATIVE MG/DL
HCT VFR BLD CALC: 25.6 % (ref 36–48)
HGB BLD-MCNC: 8.5 G/DL (ref 12–16)
IMM GRANULOCYTES NFR BLD: 3.4 % (ref 0–5)
KETONES UR STRIP-MCNC: NEGATIVE MG/DL
LYMPHOCYTES NFR BLD AUTO: 17 % (ref 15–50)
MCH RBC QN AUTO: 27.7 PG (ref 26–34)
MCHC RBC AUTO-ENTMCNC: 33.2 G/DL (ref 31–37)
MCV RBC: 83.4 FL (ref 80–100)
MONOCYTES NFR BLD: 10.7 % (ref 2–11)
NEUTROPHILS NFR BLD AUTO: 62.8 % (ref 40–80)
NITRITE UR-MCNC: NEGATIVE MG/ML
OSMOLALITY SERPL CALC.SUM OF ELEC: 278 MOSM/KG (ref 275–300)
PH UR STRIP: 5 [PH] (ref 5–6)
PLATELET # BLD: 239 10X3/UL (ref 130–400)
PMV BLD AUTO: 9.3 FL (ref 7.4–10.4)
POTASSIUM SERPL-SCNC: 3.9 MMOL/L (ref 3.5–5.1)
PROT UR-MCNC: NEGATIVE MG/DL
RBC # BLD AUTO: 3.07 10X6/UL (ref 4–5.4)
RBC #/AREA URNS HPF: (no result) /HPF (ref 0–5)
SODIUM SERPL-SCNC: 139 MMOL/L (ref 136–145)
SP GR UR STRIP: 1.01 (ref 1–1.02)
SQUAMOUS #/AREA URNS HPF: (no result) /HPF (ref 0–5)
UROBILINOGEN UR-MCNC: NORMAL MG/DL
WBC # BLD AUTO: 9.8 10X3/UL (ref 4.8–10.8)

## 2018-10-26 VITALS — SYSTOLIC BLOOD PRESSURE: 137 MMHG | DIASTOLIC BLOOD PRESSURE: 47 MMHG

## 2018-10-26 VITALS — SYSTOLIC BLOOD PRESSURE: 131 MMHG | DIASTOLIC BLOOD PRESSURE: 59 MMHG

## 2018-10-26 LAB
ANION GAP SERPL CALC-SCNC: 9.5 MMOL/L (ref 8–16)
BUN SERPL-MCNC: 17 MG/DL (ref 7–18)
CALCIUM SERPL-MCNC: 8.9 MG/DL (ref 8.5–10.1)
CHLORIDE SERPL-SCNC: 101 MMOL/L (ref 98–107)
CO2 SERPL-SCNC: 32.4 MMOL/L (ref 21–32)
CREAT SERPL-MCNC: 1.1 MG/DL (ref 0.6–1.3)
ERYTHROCYTE [DISTWIDTH] IN BLOOD BY AUTOMATED COUNT: 15.3 % (ref 11.5–14.5)
GLUCOSE SERPL-MCNC: 101 MG/DL (ref 74–106)
HCT VFR BLD CALC: 31 % (ref 36–48)
HGB BLD-MCNC: 10.6 G/DL (ref 12–16)
LYMPHOCYTES NFR BLD AUTO: 17.3 % (ref 15–50)
MCH RBC QN AUTO: 27.5 PG (ref 26–34)
MCHC RBC AUTO-ENTMCNC: 34.2 G/DL (ref 31–37)
MCV RBC: 80.3 FL (ref 80–100)
NEUTROPHILS NFR BLD AUTO: 70.7 % (ref 40–80)
OSMOLALITY SERPL CALC.SUM OF ELEC: 279 MOSM/KG (ref 275–300)
PLATELET # BLD: 260 10X3/UL (ref 130–400)
PMV BLD AUTO: 8.9 FL (ref 7.4–10.4)
POTASSIUM SERPL-SCNC: 3.9 MMOL/L (ref 3.5–5.1)
RBC # BLD AUTO: 3.86 10X6/UL (ref 4–5.4)
SODIUM SERPL-SCNC: 139 MMOL/L (ref 136–145)
WBC # BLD AUTO: 9.8 10X3/UL (ref 4.8–10.8)

## 2018-10-27 VITALS — DIASTOLIC BLOOD PRESSURE: 59 MMHG | SYSTOLIC BLOOD PRESSURE: 116 MMHG

## 2018-10-28 VITALS — SYSTOLIC BLOOD PRESSURE: 123 MMHG | DIASTOLIC BLOOD PRESSURE: 49 MMHG

## 2018-10-28 VITALS — DIASTOLIC BLOOD PRESSURE: 50 MMHG | SYSTOLIC BLOOD PRESSURE: 118 MMHG

## 2018-10-29 VITALS — DIASTOLIC BLOOD PRESSURE: 68 MMHG | SYSTOLIC BLOOD PRESSURE: 106 MMHG

## 2018-10-29 VITALS — DIASTOLIC BLOOD PRESSURE: 68 MMHG | SYSTOLIC BLOOD PRESSURE: 156 MMHG

## 2018-10-29 VITALS — SYSTOLIC BLOOD PRESSURE: 106 MMHG | DIASTOLIC BLOOD PRESSURE: 45 MMHG

## 2018-10-29 LAB
ANION GAP SERPL CALC-SCNC: 10.3 MMOL/L (ref 8–16)
BASOPHILS NFR BLD AUTO: 0.4 % (ref 0–2)
BUN SERPL-MCNC: 14 MG/DL (ref 7–18)
CALCIUM SERPL-MCNC: 9.3 MG/DL (ref 8.5–10.1)
CHLORIDE SERPL-SCNC: 102 MMOL/L (ref 98–107)
CO2 SERPL-SCNC: 31.3 MMOL/L (ref 21–32)
CREAT SERPL-MCNC: 1 MG/DL (ref 0.6–1.3)
EOSINOPHIL NFR BLD: 5 % (ref 0–7)
ERYTHROCYTE [DISTWIDTH] IN BLOOD BY AUTOMATED COUNT: 15.4 % (ref 11.5–14.5)
GLUCOSE SERPL-MCNC: 110 MG/DL (ref 74–106)
HCT VFR BLD CALC: 31.9 % (ref 36–48)
HGB BLD-MCNC: 10.4 G/DL (ref 12–16)
IMM GRANULOCYTES NFR BLD: 1.2 % (ref 0–5)
LYMPHOCYTES NFR BLD AUTO: 18.7 % (ref 15–50)
MCH RBC QN AUTO: 27.4 PG (ref 26–34)
MCHC RBC AUTO-ENTMCNC: 32.6 G/DL (ref 31–37)
MCV RBC: 83.9 FL (ref 80–100)
MONOCYTES NFR BLD: 11.4 % (ref 2–11)
NEUTROPHILS NFR BLD AUTO: 63.3 % (ref 40–80)
OSMOLALITY SERPL CALC.SUM OF ELEC: 280 MOSM/KG (ref 275–300)
PLATELET # BLD: 313 10X3/UL (ref 130–400)
PMV BLD AUTO: 9.8 FL (ref 7.4–10.4)
POTASSIUM SERPL-SCNC: 3.6 MMOL/L (ref 3.5–5.1)
RBC # BLD AUTO: 3.8 10X6/UL (ref 4–5.4)
SODIUM SERPL-SCNC: 140 MMOL/L (ref 136–145)
WBC # BLD AUTO: 9.1 10X3/UL (ref 4.8–10.8)

## 2018-10-30 VITALS — DIASTOLIC BLOOD PRESSURE: 47 MMHG | SYSTOLIC BLOOD PRESSURE: 115 MMHG

## 2018-10-30 VITALS — SYSTOLIC BLOOD PRESSURE: 117 MMHG | DIASTOLIC BLOOD PRESSURE: 41 MMHG

## 2018-10-30 LAB
ANION GAP SERPL CALC-SCNC: 7.9 MMOL/L (ref 8–16)
BASOPHILS NFR BLD AUTO: 0.2 % (ref 0–2)
BUN SERPL-MCNC: 17 MG/DL (ref 7–18)
CALCIUM SERPL-MCNC: 8.7 MG/DL (ref 8.5–10.1)
CHLORIDE SERPL-SCNC: 103 MMOL/L (ref 98–107)
CO2 SERPL-SCNC: 32.8 MMOL/L (ref 21–32)
CREAT SERPL-MCNC: 1.3 MG/DL (ref 0.6–1.3)
EOSINOPHIL NFR BLD: 3.5 % (ref 0–7)
ERYTHROCYTE [DISTWIDTH] IN BLOOD BY AUTOMATED COUNT: 15.3 % (ref 11.5–14.5)
GLUCOSE SERPL-MCNC: 102 MG/DL (ref 74–106)
HCT VFR BLD CALC: 30.8 % (ref 36–48)
HGB BLD-MCNC: 10 G/DL (ref 12–16)
IMM GRANULOCYTES NFR BLD: 1.6 % (ref 0–5)
LYMPHOCYTES NFR BLD AUTO: 20.4 % (ref 15–50)
MCH RBC QN AUTO: 27.1 PG (ref 26–34)
MCHC RBC AUTO-ENTMCNC: 32.5 G/DL (ref 31–37)
MCV RBC: 83.5 FL (ref 80–100)
MONOCYTES NFR BLD: 12.7 % (ref 2–11)
NEUTROPHILS NFR BLD AUTO: 61.6 % (ref 40–80)
OSMOLALITY SERPL CALC.SUM OF ELEC: 280 MOSM/KG (ref 275–300)
PLATELET # BLD: 279 10X3/UL (ref 130–400)
PMV BLD AUTO: 9.5 FL (ref 7.4–10.4)
POTASSIUM SERPL-SCNC: 3.7 MMOL/L (ref 3.5–5.1)
RBC # BLD AUTO: 3.69 10X6/UL (ref 4–5.4)
SODIUM SERPL-SCNC: 140 MMOL/L (ref 136–145)
WBC # BLD AUTO: 8.1 10X3/UL (ref 4.8–10.8)

## 2018-10-31 VITALS — SYSTOLIC BLOOD PRESSURE: 116 MMHG | DIASTOLIC BLOOD PRESSURE: 44 MMHG

## 2018-10-31 VITALS — SYSTOLIC BLOOD PRESSURE: 102 MMHG | DIASTOLIC BLOOD PRESSURE: 47 MMHG

## 2018-10-31 LAB
ANION GAP SERPL CALC-SCNC: 9.5 MMOL/L (ref 8–16)
BASOPHILS NFR BLD AUTO: 0.2 % (ref 0–2)
BUN SERPL-MCNC: 20 MG/DL (ref 7–18)
CALCIUM SERPL-MCNC: 8.9 MG/DL (ref 8.5–10.1)
CHLORIDE SERPL-SCNC: 101 MMOL/L (ref 98–107)
CO2 SERPL-SCNC: 32.2 MMOL/L (ref 21–32)
CREAT SERPL-MCNC: 1.3 MG/DL (ref 0.6–1.3)
EOSINOPHIL NFR BLD: 3 % (ref 0–7)
ERYTHROCYTE [DISTWIDTH] IN BLOOD BY AUTOMATED COUNT: 15.3 % (ref 11.5–14.5)
GLUCOSE SERPL-MCNC: 96 MG/DL (ref 74–106)
HCT VFR BLD CALC: 30.9 % (ref 36–48)
HGB BLD-MCNC: 10 G/DL (ref 12–16)
IMM GRANULOCYTES NFR BLD: 1.7 % (ref 0–5)
LYMPHOCYTES NFR BLD AUTO: 21.5 % (ref 15–50)
MCH RBC QN AUTO: 26.9 PG (ref 26–34)
MCHC RBC AUTO-ENTMCNC: 32.4 G/DL (ref 31–37)
MCV RBC: 83.1 FL (ref 80–100)
MONOCYTES NFR BLD: 12.5 % (ref 2–11)
NEUTROPHILS NFR BLD AUTO: 61.1 % (ref 40–80)
OSMOLALITY SERPL CALC.SUM OF ELEC: 280 MOSM/KG (ref 275–300)
PLATELET # BLD: 277 10X3/UL (ref 130–400)
PMV BLD AUTO: 9.5 FL (ref 7.4–10.4)
POTASSIUM SERPL-SCNC: 3.7 MMOL/L (ref 3.5–5.1)
RBC # BLD AUTO: 3.72 10X6/UL (ref 4–5.4)
SODIUM SERPL-SCNC: 139 MMOL/L (ref 136–145)
WBC # BLD AUTO: 8.3 10X3/UL (ref 4.8–10.8)

## 2018-11-01 VITALS — SYSTOLIC BLOOD PRESSURE: 116 MMHG | DIASTOLIC BLOOD PRESSURE: 41 MMHG

## 2018-11-01 VITALS — DIASTOLIC BLOOD PRESSURE: 38 MMHG | SYSTOLIC BLOOD PRESSURE: 147 MMHG

## 2018-11-01 LAB
FOLATE SERPL-MCNC: 5 NG/ML (ref 3–?)
VIT B12 SERPL-MCNC: 532 PG/ML (ref 232–1245)

## 2018-11-02 VITALS — SYSTOLIC BLOOD PRESSURE: 112 MMHG | DIASTOLIC BLOOD PRESSURE: 44 MMHG

## 2018-11-02 LAB
ANION GAP SERPL CALC-SCNC: 8.8 MMOL/L (ref 8–16)
BASOPHILS NFR BLD AUTO: 0.3 % (ref 0–2)
BUN SERPL-MCNC: 18 MG/DL (ref 7–18)
CALCIUM SERPL-MCNC: 9 MG/DL (ref 8.5–10.1)
CHLORIDE SERPL-SCNC: 102 MMOL/L (ref 98–107)
CO2 SERPL-SCNC: 31.7 MMOL/L (ref 21–32)
CREAT SERPL-MCNC: 1.3 MG/DL (ref 0.6–1.3)
EOSINOPHIL NFR BLD: 2.3 % (ref 0–7)
ERYTHROCYTE [DISTWIDTH] IN BLOOD BY AUTOMATED COUNT: 15.3 % (ref 11.5–14.5)
GLUCOSE SERPL-MCNC: 111 MG/DL (ref 74–106)
HCT VFR BLD CALC: 30.9 % (ref 36–48)
HGB BLD-MCNC: 10 G/DL (ref 12–16)
IMM GRANULOCYTES NFR BLD: 1.4 % (ref 0–5)
LYMPHOCYTES NFR BLD AUTO: 16.6 % (ref 15–50)
MCH RBC QN AUTO: 26.8 PG (ref 26–34)
MCHC RBC AUTO-ENTMCNC: 32.4 G/DL (ref 31–37)
MCV RBC: 82.8 FL (ref 80–100)
MONOCYTES NFR BLD: 11.1 % (ref 2–11)
NEUTROPHILS NFR BLD AUTO: 68.3 % (ref 40–80)
OSMOLALITY SERPL CALC.SUM OF ELEC: 280 MOSM/KG (ref 275–300)
PLATELET # BLD: 335 10X3/UL (ref 130–400)
PMV BLD AUTO: 9.5 FL (ref 7.4–10.4)
POTASSIUM SERPL-SCNC: 3.5 MMOL/L (ref 3.5–5.1)
RBC # BLD AUTO: 3.73 10X6/UL (ref 4–5.4)
SODIUM SERPL-SCNC: 139 MMOL/L (ref 136–145)
WBC # BLD AUTO: 9.3 10X3/UL (ref 4.8–10.8)

## 2018-11-03 VITALS — DIASTOLIC BLOOD PRESSURE: 49 MMHG | SYSTOLIC BLOOD PRESSURE: 113 MMHG

## 2018-11-03 VITALS — DIASTOLIC BLOOD PRESSURE: 67 MMHG | SYSTOLIC BLOOD PRESSURE: 123 MMHG

## 2018-11-03 LAB
CONFIRM DRVVT: 0.7 RATIO (ref 0.8–1.2)
SCREEN APTT: 40.4 SEC (ref 0–51.9)
SCREEN DRVVT: 72.9 SEC (ref 0–47)
THROMBIN TIME: 14.8 SEC (ref 0–23)

## 2018-11-04 VITALS — SYSTOLIC BLOOD PRESSURE: 117 MMHG | DIASTOLIC BLOOD PRESSURE: 35 MMHG

## 2018-11-05 VITALS — SYSTOLIC BLOOD PRESSURE: 127 MMHG | DIASTOLIC BLOOD PRESSURE: 49 MMHG

## 2018-11-05 VITALS — SYSTOLIC BLOOD PRESSURE: 129 MMHG | DIASTOLIC BLOOD PRESSURE: 45 MMHG

## 2018-11-05 VITALS — SYSTOLIC BLOOD PRESSURE: 120 MMHG | DIASTOLIC BLOOD PRESSURE: 44 MMHG

## 2018-11-05 LAB
ANION GAP SERPL CALC-SCNC: 10.3 MMOL/L (ref 8–16)
BASOPHILS NFR BLD AUTO: 0.5 % (ref 0–2)
BUN SERPL-MCNC: 14 MG/DL (ref 7–18)
CALCIUM SERPL-MCNC: 9.4 MG/DL (ref 8.5–10.1)
CHLORIDE SERPL-SCNC: 102 MMOL/L (ref 98–107)
CO2 SERPL-SCNC: 32.5 MMOL/L (ref 21–32)
CREAT SERPL-MCNC: 0.9 MG/DL (ref 0.6–1.3)
EOSINOPHIL NFR BLD: 4.5 % (ref 0–7)
ERYTHROCYTE [DISTWIDTH] IN BLOOD BY AUTOMATED COUNT: 15.1 % (ref 11.5–14.5)
GLUCOSE SERPL-MCNC: 105 MG/DL (ref 74–106)
HCT VFR BLD CALC: 32.2 % (ref 36–48)
HGB BLD-MCNC: 10.5 G/DL (ref 12–16)
IMM GRANULOCYTES NFR BLD: 1.2 % (ref 0–5)
LYMPHOCYTES NFR BLD AUTO: 25.2 % (ref 15–50)
MCH RBC QN AUTO: 27.1 PG (ref 26–34)
MCHC RBC AUTO-ENTMCNC: 32.6 G/DL (ref 31–37)
MCV RBC: 83.2 FL (ref 80–100)
MONOCYTES NFR BLD: 11 % (ref 2–11)
NEUTROPHILS NFR BLD AUTO: 57.6 % (ref 40–80)
OSMOLALITY SERPL CALC.SUM OF ELEC: 281 MOSM/KG (ref 275–300)
PLATELET # BLD: 387 10X3/UL (ref 130–400)
PMV BLD AUTO: 9.9 FL (ref 7.4–10.4)
POTASSIUM SERPL-SCNC: 3.8 MMOL/L (ref 3.5–5.1)
RBC # BLD AUTO: 3.87 10X6/UL (ref 4–5.4)
SODIUM SERPL-SCNC: 141 MMOL/L (ref 136–145)
WBC # BLD AUTO: 8.5 10X3/UL (ref 4.8–10.8)

## 2018-11-06 VITALS — SYSTOLIC BLOOD PRESSURE: 122 MMHG | DIASTOLIC BLOOD PRESSURE: 44 MMHG

## 2018-11-06 VITALS — DIASTOLIC BLOOD PRESSURE: 35 MMHG | SYSTOLIC BLOOD PRESSURE: 106 MMHG

## 2018-11-07 VITALS — SYSTOLIC BLOOD PRESSURE: 97 MMHG | DIASTOLIC BLOOD PRESSURE: 42 MMHG

## 2018-11-07 VITALS — DIASTOLIC BLOOD PRESSURE: 50 MMHG | SYSTOLIC BLOOD PRESSURE: 143 MMHG

## 2018-11-07 LAB
ANION GAP SERPL CALC-SCNC: 10.2 MMOL/L (ref 8–16)
BASOPHILS NFR BLD AUTO: 0.3 % (ref 0–2)
BUN SERPL-MCNC: 18 MG/DL (ref 7–18)
CALCIUM SERPL-MCNC: 8.9 MG/DL (ref 8.5–10.1)
CHLORIDE SERPL-SCNC: 102 MMOL/L (ref 98–107)
CO2 SERPL-SCNC: 30 MMOL/L (ref 21–32)
CREAT SERPL-MCNC: 1.5 MG/DL (ref 0.6–1.3)
EOSINOPHIL NFR BLD: 3.8 % (ref 0–7)
ERYTHROCYTE [DISTWIDTH] IN BLOOD BY AUTOMATED COUNT: 15.3 % (ref 11.5–14.5)
GLUCOSE SERPL-MCNC: 96 MG/DL (ref 74–106)
HCT VFR BLD CALC: 29.9 % (ref 36–48)
HGB BLD-MCNC: 9.7 G/DL (ref 12–16)
IMM GRANULOCYTES NFR BLD: 1.2 % (ref 0–5)
LYMPHOCYTES NFR BLD AUTO: 22.1 % (ref 15–50)
MCH RBC QN AUTO: 26.9 PG (ref 26–34)
MCHC RBC AUTO-ENTMCNC: 32.4 G/DL (ref 31–37)
MCV RBC: 83.1 FL (ref 80–100)
MONOCYTES NFR BLD: 13 % (ref 2–11)
NEUTROPHILS NFR BLD AUTO: 59.6 % (ref 40–80)
OSMOLALITY SERPL CALC.SUM OF ELEC: 277 MOSM/KG (ref 275–300)
PLATELET # BLD: 338 10X3/UL (ref 130–400)
PMV BLD AUTO: 9.3 FL (ref 7.4–10.4)
POTASSIUM SERPL-SCNC: 4.2 MMOL/L (ref 3.5–5.1)
RBC # BLD AUTO: 3.6 10X6/UL (ref 4–5.4)
SODIUM SERPL-SCNC: 138 MMOL/L (ref 136–145)
WBC # BLD AUTO: 9 10X3/UL (ref 4.8–10.8)

## 2018-11-08 VITALS — DIASTOLIC BLOOD PRESSURE: 39 MMHG | SYSTOLIC BLOOD PRESSURE: 102 MMHG

## 2018-11-08 VITALS — DIASTOLIC BLOOD PRESSURE: 36 MMHG | SYSTOLIC BLOOD PRESSURE: 108 MMHG

## 2018-11-09 VITALS — DIASTOLIC BLOOD PRESSURE: 33 MMHG | SYSTOLIC BLOOD PRESSURE: 94 MMHG

## 2018-11-09 LAB
ANION GAP SERPL CALC-SCNC: 9.2 MMOL/L (ref 8–16)
BASOPHILS NFR BLD AUTO: 0.2 % (ref 0–2)
BUN SERPL-MCNC: 21 MG/DL (ref 7–18)
CALCIUM SERPL-MCNC: 8.2 MG/DL (ref 8.5–10.1)
CHLORIDE SERPL-SCNC: 101 MMOL/L (ref 98–107)
CO2 SERPL-SCNC: 29.5 MMOL/L (ref 21–32)
CREAT SERPL-MCNC: 2 MG/DL (ref 0.6–1.3)
EOSINOPHIL NFR BLD: 5.1 % (ref 0–7)
ERYTHROCYTE [DISTWIDTH] IN BLOOD BY AUTOMATED COUNT: 15.6 % (ref 11.5–14.5)
GLUCOSE SERPL-MCNC: 94 MG/DL (ref 74–106)
HCT VFR BLD CALC: 27.6 % (ref 36–48)
HGB BLD-MCNC: 8.9 G/DL (ref 12–16)
IMM GRANULOCYTES NFR BLD: 0.8 % (ref 0–5)
LYMPHOCYTES NFR BLD AUTO: 23.9 % (ref 15–50)
MCH RBC QN AUTO: 26.7 PG (ref 26–34)
MCHC RBC AUTO-ENTMCNC: 32.2 G/DL (ref 31–37)
MCV RBC: 82.9 FL (ref 80–100)
MONOCYTES NFR BLD: 12.5 % (ref 2–11)
NEUTROPHILS NFR BLD AUTO: 57.5 % (ref 40–80)
OSMOLALITY SERPL CALC.SUM OF ELEC: 272 MOSM/KG (ref 275–300)
PLATELET # BLD: 368 10X3/UL (ref 130–400)
PMV BLD AUTO: 9.8 FL (ref 7.4–10.4)
POTASSIUM SERPL-SCNC: 4.7 MMOL/L (ref 3.5–5.1)
RBC # BLD AUTO: 3.33 10X6/UL (ref 4–5.4)
SODIUM SERPL-SCNC: 135 MMOL/L (ref 136–145)
WBC # BLD AUTO: 8.5 10X3/UL (ref 4.8–10.8)

## 2018-12-03 ENCOUNTER — HOSPITAL ENCOUNTER (INPATIENT)
Dept: HOSPITAL 84 - D.SDCHOLD | Age: 73
LOS: 11 days | Discharge: NURSING, REHAB OR LONG-TERM CARE | DRG: 486 | End: 2018-12-14
Attending: ORTHOPAEDIC SURGERY | Admitting: ORTHOPAEDIC SURGERY
Payer: MEDICARE

## 2018-12-03 VITALS — DIASTOLIC BLOOD PRESSURE: 43 MMHG | SYSTOLIC BLOOD PRESSURE: 118 MMHG

## 2018-12-03 VITALS — DIASTOLIC BLOOD PRESSURE: 42 MMHG | SYSTOLIC BLOOD PRESSURE: 98 MMHG

## 2018-12-03 VITALS — HEIGHT: 63 IN | BODY MASS INDEX: 50.14 KG/M2 | BODY MASS INDEX: 50.14 KG/M2 | WEIGHT: 283 LBS

## 2018-12-03 DIAGNOSIS — E66.01: ICD-10-CM

## 2018-12-03 DIAGNOSIS — D68.59: ICD-10-CM

## 2018-12-03 DIAGNOSIS — N17.9: ICD-10-CM

## 2018-12-03 DIAGNOSIS — T84.89XA: ICD-10-CM

## 2018-12-03 DIAGNOSIS — I50.9: ICD-10-CM

## 2018-12-03 DIAGNOSIS — I10: ICD-10-CM

## 2018-12-03 DIAGNOSIS — F32.9: ICD-10-CM

## 2018-12-03 DIAGNOSIS — G62.9: ICD-10-CM

## 2018-12-03 DIAGNOSIS — T81.41XA: ICD-10-CM

## 2018-12-03 DIAGNOSIS — T84.54XA: Primary | ICD-10-CM

## 2018-12-03 LAB
ANION GAP SERPL CALC-SCNC: 8.7 MMOL/L (ref 8–16)
BASOPHILS NFR BLD AUTO: 0.2 % (ref 0–2)
BUN SERPL-MCNC: 16 MG/DL (ref 7–18)
CALCIUM SERPL-MCNC: 9.1 MG/DL (ref 8.5–10.1)
CHLORIDE SERPL-SCNC: 98 MMOL/L (ref 98–107)
CO2 SERPL-SCNC: 33.8 MMOL/L (ref 21–32)
CREAT SERPL-MCNC: 0.8 MG/DL (ref 0.6–1.3)
CRP SERPL-MCNC: 5 MG/DL (ref 0–0.9)
EOSINOPHIL NFR BLD: 2.7 % (ref 0–7)
ERYTHROCYTE [DISTWIDTH] IN BLOOD BY AUTOMATED COUNT: 15.1 % (ref 11.5–14.5)
ERYTHROCYTE [SEDIMENTATION RATE] IN BLOOD: 75 MM/HR (ref 0–30)
GLUCOSE SERPL-MCNC: 107 MG/DL (ref 74–106)
HCT VFR BLD CALC: 33.2 % (ref 36–48)
HGB BLD-MCNC: 10.8 G/DL (ref 12–16)
IMM GRANULOCYTES NFR BLD: 1.9 % (ref 0–5)
LYMPHOCYTES NFR BLD AUTO: 15.9 % (ref 15–50)
MCH RBC QN AUTO: 27.1 PG (ref 26–34)
MCHC RBC AUTO-ENTMCNC: 32.5 G/DL (ref 31–37)
MCV RBC: 83.4 FL (ref 80–100)
MONOCYTES NFR BLD: 7.9 % (ref 2–11)
NEUTROPHILS NFR BLD AUTO: 71.4 % (ref 40–80)
OSMOLALITY SERPL CALC.SUM OF ELEC: 272 MOSM/KG (ref 275–300)
PLATELET # BLD: 374 10X3/UL (ref 130–400)
PMV BLD AUTO: 9.1 FL (ref 7.4–10.4)
POTASSIUM SERPL-SCNC: 4.5 MMOL/L (ref 3.5–5.1)
RBC # BLD AUTO: 3.98 10X6/UL (ref 4–5.4)
SODIUM SERPL-SCNC: 136 MMOL/L (ref 136–145)
WBC # BLD AUTO: 12.1 10X3/UL (ref 4.8–10.8)

## 2018-12-04 VITALS — SYSTOLIC BLOOD PRESSURE: 115 MMHG | DIASTOLIC BLOOD PRESSURE: 49 MMHG

## 2018-12-04 VITALS — SYSTOLIC BLOOD PRESSURE: 114 MMHG | DIASTOLIC BLOOD PRESSURE: 44 MMHG

## 2018-12-04 VITALS — DIASTOLIC BLOOD PRESSURE: 54 MMHG | SYSTOLIC BLOOD PRESSURE: 126 MMHG

## 2018-12-04 VITALS — SYSTOLIC BLOOD PRESSURE: 94 MMHG | DIASTOLIC BLOOD PRESSURE: 55 MMHG

## 2018-12-04 VITALS — DIASTOLIC BLOOD PRESSURE: 48 MMHG | SYSTOLIC BLOOD PRESSURE: 115 MMHG

## 2018-12-04 LAB
ANION GAP SERPL CALC-SCNC: 14.4 MMOL/L (ref 8–16)
BASOPHILS NFR BLD AUTO: 0.2 % (ref 0–2)
BUN SERPL-MCNC: 16 MG/DL (ref 7–18)
CALCIUM SERPL-MCNC: 8.9 MG/DL (ref 8.5–10.1)
CHLORIDE SERPL-SCNC: 100 MMOL/L (ref 98–107)
CO2 SERPL-SCNC: 29 MMOL/L (ref 21–32)
CREAT SERPL-MCNC: 1.2 MG/DL (ref 0.6–1.3)
EOSINOPHIL NFR BLD: 3 % (ref 0–7)
ERYTHROCYTE [DISTWIDTH] IN BLOOD BY AUTOMATED COUNT: 15.1 % (ref 11.5–14.5)
GLUCOSE SERPL-MCNC: 100 MG/DL (ref 74–106)
HCT VFR BLD CALC: 32.1 % (ref 36–48)
HGB BLD-MCNC: 10.7 G/DL (ref 12–16)
IMM GRANULOCYTES NFR BLD: 1.2 % (ref 0–5)
LYMPHOCYTES NFR BLD AUTO: 16.7 % (ref 15–50)
MCH RBC QN AUTO: 27.6 PG (ref 26–34)
MCHC RBC AUTO-ENTMCNC: 33.3 G/DL (ref 31–37)
MCV RBC: 82.9 FL (ref 80–100)
MONOCYTES NFR BLD: 7.4 % (ref 2–11)
NEUTROPHILS NFR BLD AUTO: 71.5 % (ref 40–80)
OSMOLALITY SERPL CALC.SUM OF ELEC: 278 MOSM/KG (ref 275–300)
PLATELET # BLD: 360 10X3/UL (ref 130–400)
PMV BLD AUTO: 9.7 FL (ref 7.4–10.4)
POTASSIUM SERPL-SCNC: 4.4 MMOL/L (ref 3.5–5.1)
RBC # BLD AUTO: 3.87 10X6/UL (ref 4–5.4)
SODIUM SERPL-SCNC: 139 MMOL/L (ref 136–145)
WBC # BLD AUTO: 12.4 10X3/UL (ref 4.8–10.8)

## 2018-12-05 VITALS — SYSTOLIC BLOOD PRESSURE: 114 MMHG | DIASTOLIC BLOOD PRESSURE: 44 MMHG

## 2018-12-05 VITALS — SYSTOLIC BLOOD PRESSURE: 127 MMHG | DIASTOLIC BLOOD PRESSURE: 51 MMHG

## 2018-12-05 VITALS — DIASTOLIC BLOOD PRESSURE: 76 MMHG | SYSTOLIC BLOOD PRESSURE: 125 MMHG

## 2018-12-05 VITALS — SYSTOLIC BLOOD PRESSURE: 105 MMHG | DIASTOLIC BLOOD PRESSURE: 58 MMHG

## 2018-12-05 VITALS — DIASTOLIC BLOOD PRESSURE: 58 MMHG | SYSTOLIC BLOOD PRESSURE: 121 MMHG

## 2018-12-05 LAB
ANION GAP SERPL CALC-SCNC: 10.7 MMOL/L (ref 8–16)
BASOPHILS NFR BLD AUTO: 0.2 % (ref 0–2)
BUN SERPL-MCNC: 14 MG/DL (ref 7–18)
CALCIUM SERPL-MCNC: 9.2 MG/DL (ref 8.5–10.1)
CHLORIDE SERPL-SCNC: 102 MMOL/L (ref 98–107)
CO2 SERPL-SCNC: 31.2 MMOL/L (ref 21–32)
CREAT SERPL-MCNC: 1 MG/DL (ref 0.6–1.3)
EOSINOPHIL NFR BLD: 4.1 % (ref 0–7)
ERYTHROCYTE [DISTWIDTH] IN BLOOD BY AUTOMATED COUNT: 15 % (ref 11.5–14.5)
GLUCOSE SERPL-MCNC: 99 MG/DL (ref 74–106)
HCT VFR BLD CALC: 30.8 % (ref 36–48)
HGB BLD-MCNC: 10 G/DL (ref 12–16)
IMM GRANULOCYTES NFR BLD: 1.5 % (ref 0–5)
INR PPP: 1.4 (ref 0.85–1.17)
LYMPHOCYTES NFR BLD AUTO: 19.1 % (ref 15–50)
MCH RBC QN AUTO: 27 PG (ref 26–34)
MCHC RBC AUTO-ENTMCNC: 32.5 G/DL (ref 31–37)
MCV RBC: 83 FL (ref 80–100)
MONOCYTES NFR BLD: 9 % (ref 2–11)
NEUTROPHILS NFR BLD AUTO: 66.1 % (ref 40–80)
OSMOLALITY SERPL CALC.SUM OF ELEC: 279 MOSM/KG (ref 275–300)
PLATELET # BLD: 367 10X3/UL (ref 130–400)
PMV BLD AUTO: 9.5 FL (ref 7.4–10.4)
POTASSIUM SERPL-SCNC: 3.9 MMOL/L (ref 3.5–5.1)
PROTHROMBIN TIME: 16.6 SECONDS (ref 11.6–15)
RBC # BLD AUTO: 3.71 10X6/UL (ref 4–5.4)
SODIUM SERPL-SCNC: 140 MMOL/L (ref 136–145)
WBC # BLD AUTO: 8.9 10X3/UL (ref 4.8–10.8)

## 2018-12-05 PROCEDURE — 0S9D3ZX DRAINAGE OF LEFT KNEE JOINT, PERCUTANEOUS APPROACH, DIAGNOSTIC: ICD-10-PCS | Performed by: RADIOLOGY

## 2018-12-06 VITALS — DIASTOLIC BLOOD PRESSURE: 45 MMHG | SYSTOLIC BLOOD PRESSURE: 117 MMHG

## 2018-12-06 VITALS — DIASTOLIC BLOOD PRESSURE: 49 MMHG | SYSTOLIC BLOOD PRESSURE: 111 MMHG

## 2018-12-06 VITALS — DIASTOLIC BLOOD PRESSURE: 81 MMHG | SYSTOLIC BLOOD PRESSURE: 124 MMHG

## 2018-12-06 VITALS — DIASTOLIC BLOOD PRESSURE: 48 MMHG | SYSTOLIC BLOOD PRESSURE: 103 MMHG

## 2018-12-06 VITALS — DIASTOLIC BLOOD PRESSURE: 74 MMHG | SYSTOLIC BLOOD PRESSURE: 134 MMHG

## 2018-12-06 VITALS — SYSTOLIC BLOOD PRESSURE: 114 MMHG | DIASTOLIC BLOOD PRESSURE: 42 MMHG

## 2018-12-06 LAB
ANION GAP SERPL CALC-SCNC: 8.8 MMOL/L (ref 8–16)
BASOPHILS NFR BLD AUTO: 0.5 % (ref 0–2)
BUN SERPL-MCNC: 13 MG/DL (ref 7–18)
CALCIUM SERPL-MCNC: 9.2 MG/DL (ref 8.5–10.1)
CHLORIDE SERPL-SCNC: 102 MMOL/L (ref 98–107)
CO2 SERPL-SCNC: 29.1 MMOL/L (ref 21–32)
CREAT SERPL-MCNC: 1 MG/DL (ref 0.6–1.3)
EOSINOPHIL NFR BLD: 4.2 % (ref 0–7)
ERYTHROCYTE [DISTWIDTH] IN BLOOD BY AUTOMATED COUNT: 15.2 % (ref 11.5–14.5)
GLUCOSE SERPL-MCNC: 93 MG/DL (ref 74–106)
HCT VFR BLD CALC: 29.5 % (ref 36–48)
HGB BLD-MCNC: 9.5 G/DL (ref 12–16)
IMM GRANULOCYTES NFR BLD: 1.6 % (ref 0–5)
LYMPHOCYTES NFR BLD AUTO: 20.6 % (ref 15–50)
MCH RBC QN AUTO: 26.9 PG (ref 26–34)
MCHC RBC AUTO-ENTMCNC: 32.2 G/DL (ref 31–37)
MCV RBC: 83.6 FL (ref 80–100)
MONOCYTES NFR BLD: 11.9 % (ref 2–11)
NEUTROPHILS NFR BLD AUTO: 61.2 % (ref 40–80)
OSMOLALITY SERPL CALC.SUM OF ELEC: 271 MOSM/KG (ref 275–300)
PLATELET # BLD: 393 10X3/UL (ref 130–400)
PMV BLD AUTO: 9.4 FL (ref 7.4–10.4)
POTASSIUM SERPL-SCNC: 3.9 MMOL/L (ref 3.5–5.1)
RBC # BLD AUTO: 3.53 10X6/UL (ref 4–5.4)
SODIUM SERPL-SCNC: 136 MMOL/L (ref 136–145)
WBC # BLD AUTO: 8.3 10X3/UL (ref 4.8–10.8)

## 2018-12-06 PROCEDURE — 0SBD0ZZ EXCISION OF LEFT KNEE JOINT, OPEN APPROACH: ICD-10-PCS | Performed by: ORTHOPAEDIC SURGERY

## 2018-12-07 VITALS — DIASTOLIC BLOOD PRESSURE: 53 MMHG | SYSTOLIC BLOOD PRESSURE: 121 MMHG

## 2018-12-07 VITALS — SYSTOLIC BLOOD PRESSURE: 106 MMHG | DIASTOLIC BLOOD PRESSURE: 60 MMHG

## 2018-12-07 VITALS — SYSTOLIC BLOOD PRESSURE: 124 MMHG | DIASTOLIC BLOOD PRESSURE: 54 MMHG

## 2018-12-07 VITALS — SYSTOLIC BLOOD PRESSURE: 105 MMHG | DIASTOLIC BLOOD PRESSURE: 44 MMHG

## 2018-12-07 VITALS — SYSTOLIC BLOOD PRESSURE: 130 MMHG | DIASTOLIC BLOOD PRESSURE: 60 MMHG

## 2018-12-07 VITALS — DIASTOLIC BLOOD PRESSURE: 50 MMHG | SYSTOLIC BLOOD PRESSURE: 114 MMHG

## 2018-12-07 LAB
ANION GAP SERPL CALC-SCNC: 11.6 MMOL/L (ref 8–16)
BASOPHILS NFR BLD AUTO: 0.1 % (ref 0–2)
BUN SERPL-MCNC: 15 MG/DL (ref 7–18)
CALCIUM SERPL-MCNC: 9.1 MG/DL (ref 8.5–10.1)
CHLORIDE SERPL-SCNC: 99 MMOL/L (ref 98–107)
CO2 SERPL-SCNC: 30.4 MMOL/L (ref 21–32)
CREAT SERPL-MCNC: 1.2 MG/DL (ref 0.6–1.3)
EOSINOPHIL NFR BLD: 0 % (ref 0–7)
ERYTHROCYTE [DISTWIDTH] IN BLOOD BY AUTOMATED COUNT: 14.5 % (ref 11.5–14.5)
GLUCOSE SERPL-MCNC: 144 MG/DL (ref 74–106)
HCT VFR BLD CALC: 30 % (ref 36–48)
HGB BLD-MCNC: 9.6 G/DL (ref 12–16)
IMM GRANULOCYTES NFR BLD: 1 % (ref 0–5)
LYMPHOCYTES NFR BLD AUTO: 8.8 % (ref 15–50)
MCH RBC QN AUTO: 26.4 PG (ref 26–34)
MCHC RBC AUTO-ENTMCNC: 32 G/DL (ref 31–37)
MCV RBC: 82.4 FL (ref 80–100)
MONOCYTES NFR BLD: 6.5 % (ref 2–11)
NEUTROPHILS NFR BLD AUTO: 83.6 % (ref 40–80)
OSMOLALITY SERPL CALC.SUM OF ELEC: 277 MOSM/KG (ref 275–300)
PLATELET # BLD: 409 10X3/UL (ref 130–400)
PMV BLD AUTO: 9.4 FL (ref 7.4–10.4)
POTASSIUM SERPL-SCNC: 4 MMOL/L (ref 3.5–5.1)
RBC # BLD AUTO: 3.64 10X6/UL (ref 4–5.4)
SODIUM SERPL-SCNC: 137 MMOL/L (ref 136–145)
WBC # BLD AUTO: 9.4 10X3/UL (ref 4.8–10.8)

## 2018-12-08 VITALS — SYSTOLIC BLOOD PRESSURE: 106 MMHG | DIASTOLIC BLOOD PRESSURE: 44 MMHG

## 2018-12-08 VITALS — SYSTOLIC BLOOD PRESSURE: 110 MMHG | DIASTOLIC BLOOD PRESSURE: 51 MMHG

## 2018-12-08 VITALS — DIASTOLIC BLOOD PRESSURE: 36 MMHG | SYSTOLIC BLOOD PRESSURE: 99 MMHG

## 2018-12-08 VITALS — DIASTOLIC BLOOD PRESSURE: 53 MMHG | SYSTOLIC BLOOD PRESSURE: 110 MMHG

## 2018-12-08 VITALS — DIASTOLIC BLOOD PRESSURE: 52 MMHG | SYSTOLIC BLOOD PRESSURE: 132 MMHG

## 2018-12-08 VITALS — DIASTOLIC BLOOD PRESSURE: 47 MMHG | SYSTOLIC BLOOD PRESSURE: 107 MMHG

## 2018-12-08 VITALS — DIASTOLIC BLOOD PRESSURE: 51 MMHG | SYSTOLIC BLOOD PRESSURE: 127 MMHG

## 2018-12-08 LAB
ANION GAP SERPL CALC-SCNC: 8.1 MMOL/L (ref 8–16)
BASOPHILS NFR BLD AUTO: 0.4 % (ref 0–2)
BUN SERPL-MCNC: 17 MG/DL (ref 7–18)
CALCIUM SERPL-MCNC: 8.8 MG/DL (ref 8.5–10.1)
CHLORIDE SERPL-SCNC: 102 MMOL/L (ref 98–107)
CO2 SERPL-SCNC: 31.6 MMOL/L (ref 21–32)
CREAT SERPL-MCNC: 1.1 MG/DL (ref 0.6–1.3)
EOSINOPHIL NFR BLD: 3.5 % (ref 0–7)
ERYTHROCYTE [DISTWIDTH] IN BLOOD BY AUTOMATED COUNT: 15 % (ref 11.5–14.5)
GLUCOSE SERPL-MCNC: 101 MG/DL (ref 74–106)
HCT VFR BLD CALC: 29.2 % (ref 36–48)
HGB BLD-MCNC: 9.5 G/DL (ref 12–16)
IMM GRANULOCYTES NFR BLD: 1.4 % (ref 0–5)
LYMPHOCYTES NFR BLD AUTO: 25.7 % (ref 15–50)
MCH RBC QN AUTO: 27.1 PG (ref 26–34)
MCHC RBC AUTO-ENTMCNC: 32.5 G/DL (ref 31–37)
MCV RBC: 83.2 FL (ref 80–100)
MONOCYTES NFR BLD: 9.9 % (ref 2–11)
NEUTROPHILS NFR BLD AUTO: 59.1 % (ref 40–80)
OSMOLALITY SERPL CALC.SUM OF ELEC: 277 MOSM/KG (ref 275–300)
PLATELET # BLD: 353 10X3/UL (ref 130–400)
PMV BLD AUTO: 9 FL (ref 7.4–10.4)
POTASSIUM SERPL-SCNC: 3.7 MMOL/L (ref 3.5–5.1)
RBC # BLD AUTO: 3.51 10X6/UL (ref 4–5.4)
SODIUM SERPL-SCNC: 138 MMOL/L (ref 136–145)
WBC # BLD AUTO: 9.6 10X3/UL (ref 4.8–10.8)

## 2018-12-09 VITALS — DIASTOLIC BLOOD PRESSURE: 40 MMHG | SYSTOLIC BLOOD PRESSURE: 103 MMHG

## 2018-12-09 VITALS — SYSTOLIC BLOOD PRESSURE: 119 MMHG | DIASTOLIC BLOOD PRESSURE: 72 MMHG

## 2018-12-09 VITALS — SYSTOLIC BLOOD PRESSURE: 111 MMHG | DIASTOLIC BLOOD PRESSURE: 47 MMHG

## 2018-12-09 VITALS — SYSTOLIC BLOOD PRESSURE: 112 MMHG | DIASTOLIC BLOOD PRESSURE: 44 MMHG

## 2018-12-09 VITALS — DIASTOLIC BLOOD PRESSURE: 45 MMHG | SYSTOLIC BLOOD PRESSURE: 126 MMHG

## 2018-12-09 LAB
ANION GAP SERPL CALC-SCNC: 7.7 MMOL/L (ref 8–16)
BASOPHILS NFR BLD AUTO: 0.3 % (ref 0–2)
BUN SERPL-MCNC: 19 MG/DL (ref 7–18)
CALCIUM SERPL-MCNC: 9.3 MG/DL (ref 8.5–10.1)
CHLORIDE SERPL-SCNC: 102 MMOL/L (ref 98–107)
CO2 SERPL-SCNC: 32 MMOL/L (ref 21–32)
CREAT SERPL-MCNC: 1.1 MG/DL (ref 0.6–1.3)
EOSINOPHIL NFR BLD: 4.2 % (ref 0–7)
ERYTHROCYTE [DISTWIDTH] IN BLOOD BY AUTOMATED COUNT: 15.3 % (ref 11.5–14.5)
GLUCOSE SERPL-MCNC: 103 MG/DL (ref 74–106)
HCT VFR BLD CALC: 29.8 % (ref 36–48)
HGB BLD-MCNC: 9.6 G/DL (ref 12–16)
IMM GRANULOCYTES NFR BLD: 1.6 % (ref 0–5)
LYMPHOCYTES NFR BLD AUTO: 22.1 % (ref 15–50)
MCH RBC QN AUTO: 26.7 PG (ref 26–34)
MCHC RBC AUTO-ENTMCNC: 32.2 G/DL (ref 31–37)
MCV RBC: 83 FL (ref 80–100)
MONOCYTES NFR BLD: 12.1 % (ref 2–11)
NEUTROPHILS NFR BLD AUTO: 59.7 % (ref 40–80)
OSMOLALITY SERPL CALC.SUM OF ELEC: 277 MOSM/KG (ref 275–300)
PLATELET # BLD: 327 10X3/UL (ref 130–400)
PMV BLD AUTO: 9 FL (ref 7.4–10.4)
POTASSIUM SERPL-SCNC: 3.7 MMOL/L (ref 3.5–5.1)
RBC # BLD AUTO: 3.59 10X6/UL (ref 4–5.4)
SODIUM SERPL-SCNC: 138 MMOL/L (ref 136–145)
WBC # BLD AUTO: 9.5 10X3/UL (ref 4.8–10.8)

## 2018-12-10 VITALS — DIASTOLIC BLOOD PRESSURE: 50 MMHG | SYSTOLIC BLOOD PRESSURE: 120 MMHG

## 2018-12-10 VITALS — SYSTOLIC BLOOD PRESSURE: 129 MMHG | DIASTOLIC BLOOD PRESSURE: 59 MMHG

## 2018-12-10 VITALS — SYSTOLIC BLOOD PRESSURE: 120 MMHG | DIASTOLIC BLOOD PRESSURE: 44 MMHG

## 2018-12-10 VITALS — DIASTOLIC BLOOD PRESSURE: 51 MMHG | SYSTOLIC BLOOD PRESSURE: 122 MMHG

## 2018-12-10 LAB
ANION GAP SERPL CALC-SCNC: 10.9 MMOL/L (ref 8–16)
BASOPHILS NFR BLD AUTO: 0.3 % (ref 0–2)
BUN SERPL-MCNC: 19 MG/DL (ref 7–18)
CALCIUM SERPL-MCNC: 8.5 MG/DL (ref 8.5–10.1)
CHLORIDE SERPL-SCNC: 101 MMOL/L (ref 98–107)
CO2 SERPL-SCNC: 30.6 MMOL/L (ref 21–32)
CREAT SERPL-MCNC: 1.2 MG/DL (ref 0.6–1.3)
EOSINOPHIL NFR BLD: 4.5 % (ref 0–7)
ERYTHROCYTE [DISTWIDTH] IN BLOOD BY AUTOMATED COUNT: 15.4 % (ref 11.5–14.5)
GLUCOSE SERPL-MCNC: 105 MG/DL (ref 74–106)
HCT VFR BLD CALC: 29.2 % (ref 36–48)
HGB BLD-MCNC: 9.4 G/DL (ref 12–16)
IMM GRANULOCYTES NFR BLD: 1.7 % (ref 0–5)
LYMPHOCYTES NFR BLD AUTO: 23.3 % (ref 15–50)
MCH RBC QN AUTO: 26.9 PG (ref 26–34)
MCHC RBC AUTO-ENTMCNC: 32.2 G/DL (ref 31–37)
MCV RBC: 83.4 FL (ref 80–100)
MONOCYTES NFR BLD: 11.8 % (ref 2–11)
NEUTROPHILS NFR BLD AUTO: 58.4 % (ref 40–80)
OSMOLALITY SERPL CALC.SUM OF ELEC: 279 MOSM/KG (ref 275–300)
PLATELET # BLD: 346 10X3/UL (ref 130–400)
PMV BLD AUTO: 9.3 FL (ref 7.4–10.4)
POTASSIUM SERPL-SCNC: 3.5 MMOL/L (ref 3.5–5.1)
RBC # BLD AUTO: 3.5 10X6/UL (ref 4–5.4)
SODIUM SERPL-SCNC: 139 MMOL/L (ref 136–145)
WBC # BLD AUTO: 10.1 10X3/UL (ref 4.8–10.8)

## 2018-12-11 VITALS — SYSTOLIC BLOOD PRESSURE: 123 MMHG | DIASTOLIC BLOOD PRESSURE: 51 MMHG

## 2018-12-11 VITALS — SYSTOLIC BLOOD PRESSURE: 126 MMHG | DIASTOLIC BLOOD PRESSURE: 55 MMHG

## 2018-12-11 VITALS — SYSTOLIC BLOOD PRESSURE: 103 MMHG | DIASTOLIC BLOOD PRESSURE: 44 MMHG

## 2018-12-11 VITALS — SYSTOLIC BLOOD PRESSURE: 134 MMHG | DIASTOLIC BLOOD PRESSURE: 64 MMHG

## 2018-12-11 VITALS — DIASTOLIC BLOOD PRESSURE: 56 MMHG | SYSTOLIC BLOOD PRESSURE: 126 MMHG

## 2018-12-11 VITALS — DIASTOLIC BLOOD PRESSURE: 68 MMHG | SYSTOLIC BLOOD PRESSURE: 124 MMHG

## 2018-12-11 LAB
ANION GAP SERPL CALC-SCNC: 10.2 MMOL/L (ref 8–16)
BASOPHILS NFR BLD AUTO: 0.5 % (ref 0–2)
BUN SERPL-MCNC: 19 MG/DL (ref 7–18)
CALCIUM SERPL-MCNC: 8.4 MG/DL (ref 8.5–10.1)
CHLORIDE SERPL-SCNC: 103 MMOL/L (ref 98–107)
CO2 SERPL-SCNC: 30.4 MMOL/L (ref 21–32)
CREAT SERPL-MCNC: 1.2 MG/DL (ref 0.6–1.3)
EOSINOPHIL NFR BLD: 4.9 % (ref 0–7)
ERYTHROCYTE [DISTWIDTH] IN BLOOD BY AUTOMATED COUNT: 15.1 % (ref 11.5–14.5)
GLUCOSE SERPL-MCNC: 106 MG/DL (ref 74–106)
HCT VFR BLD CALC: 29.4 % (ref 36–48)
HGB BLD-MCNC: 9.6 G/DL (ref 12–16)
IMM GRANULOCYTES NFR BLD: 1.3 % (ref 0–5)
LYMPHOCYTES NFR BLD AUTO: 21.4 % (ref 15–50)
MCH RBC QN AUTO: 27 PG (ref 26–34)
MCHC RBC AUTO-ENTMCNC: 32.7 G/DL (ref 31–37)
MCV RBC: 82.8 FL (ref 80–100)
MONOCYTES NFR BLD: 10.1 % (ref 2–11)
NEUTROPHILS NFR BLD AUTO: 61.8 % (ref 40–80)
OSMOLALITY SERPL CALC.SUM OF ELEC: 280 MOSM/KG (ref 275–300)
PLATELET # BLD: 314 10X3/UL (ref 130–400)
PMV BLD AUTO: 9.4 FL (ref 7.4–10.4)
POTASSIUM SERPL-SCNC: 3.6 MMOL/L (ref 3.5–5.1)
RBC # BLD AUTO: 3.55 10X6/UL (ref 4–5.4)
SODIUM SERPL-SCNC: 140 MMOL/L (ref 136–145)
WBC # BLD AUTO: 9.2 10X3/UL (ref 4.8–10.8)

## 2018-12-11 PROCEDURE — 05HY33Z INSERTION OF INFUSION DEVICE INTO UPPER VEIN, PERCUTANEOUS APPROACH: ICD-10-PCS | Performed by: ORTHOPAEDIC SURGERY

## 2018-12-12 VITALS — DIASTOLIC BLOOD PRESSURE: 55 MMHG | SYSTOLIC BLOOD PRESSURE: 128 MMHG

## 2018-12-12 VITALS — SYSTOLIC BLOOD PRESSURE: 132 MMHG | DIASTOLIC BLOOD PRESSURE: 58 MMHG

## 2018-12-12 VITALS — DIASTOLIC BLOOD PRESSURE: 81 MMHG | SYSTOLIC BLOOD PRESSURE: 128 MMHG

## 2018-12-12 VITALS — SYSTOLIC BLOOD PRESSURE: 111 MMHG | DIASTOLIC BLOOD PRESSURE: 41 MMHG

## 2018-12-12 VITALS — SYSTOLIC BLOOD PRESSURE: 114 MMHG | DIASTOLIC BLOOD PRESSURE: 48 MMHG

## 2018-12-12 LAB
ANION GAP SERPL CALC-SCNC: 10.1 MMOL/L (ref 8–16)
BASOPHILS NFR BLD AUTO: 0.3 % (ref 0–2)
BUN SERPL-MCNC: 21 MG/DL (ref 7–18)
CALCIUM SERPL-MCNC: 8.9 MG/DL (ref 8.5–10.1)
CHLORIDE SERPL-SCNC: 104 MMOL/L (ref 98–107)
CO2 SERPL-SCNC: 30.3 MMOL/L (ref 21–32)
CREAT SERPL-MCNC: 1.3 MG/DL (ref 0.6–1.3)
EOSINOPHIL NFR BLD: 4.7 % (ref 0–7)
ERYTHROCYTE [DISTWIDTH] IN BLOOD BY AUTOMATED COUNT: 15.4 % (ref 11.5–14.5)
GLUCOSE SERPL-MCNC: 90 MG/DL (ref 74–106)
HCT VFR BLD CALC: 29.5 % (ref 36–48)
HGB BLD-MCNC: 9.6 G/DL (ref 12–16)
IMM GRANULOCYTES NFR BLD: 1.3 % (ref 0–5)
LYMPHOCYTES NFR BLD AUTO: 22.3 % (ref 15–50)
MCH RBC QN AUTO: 27 PG (ref 26–34)
MCHC RBC AUTO-ENTMCNC: 32.5 G/DL (ref 31–37)
MCV RBC: 82.9 FL (ref 80–100)
MONOCYTES NFR BLD: 10.2 % (ref 2–11)
NEUTROPHILS NFR BLD AUTO: 61.2 % (ref 40–80)
OSMOLALITY SERPL CALC.SUM OF ELEC: 283 MOSM/KG (ref 275–300)
PLATELET # BLD: 304 10X3/UL (ref 130–400)
PMV BLD AUTO: 9.8 FL (ref 7.4–10.4)
POTASSIUM SERPL-SCNC: 3.4 MMOL/L (ref 3.5–5.1)
RBC # BLD AUTO: 3.56 10X6/UL (ref 4–5.4)
SODIUM SERPL-SCNC: 141 MMOL/L (ref 136–145)
WBC # BLD AUTO: 8.9 10X3/UL (ref 4.8–10.8)

## 2018-12-13 VITALS — DIASTOLIC BLOOD PRESSURE: 41 MMHG | SYSTOLIC BLOOD PRESSURE: 107 MMHG

## 2018-12-13 VITALS — DIASTOLIC BLOOD PRESSURE: 59 MMHG | SYSTOLIC BLOOD PRESSURE: 129 MMHG

## 2018-12-13 VITALS — SYSTOLIC BLOOD PRESSURE: 107 MMHG | DIASTOLIC BLOOD PRESSURE: 48 MMHG

## 2018-12-13 VITALS — DIASTOLIC BLOOD PRESSURE: 42 MMHG | SYSTOLIC BLOOD PRESSURE: 104 MMHG

## 2018-12-13 LAB
ANION GAP SERPL CALC-SCNC: 10.9 MMOL/L (ref 8–16)
BASOPHILS NFR BLD AUTO: 0.2 % (ref 0–2)
BUN SERPL-MCNC: 20 MG/DL (ref 7–18)
CALCIUM SERPL-MCNC: 8.3 MG/DL (ref 8.5–10.1)
CHLORIDE SERPL-SCNC: 103 MMOL/L (ref 98–107)
CO2 SERPL-SCNC: 29.4 MMOL/L (ref 21–32)
CREAT SERPL-MCNC: 1.3 MG/DL (ref 0.6–1.3)
EOSINOPHIL NFR BLD: 3.9 % (ref 0–7)
ERYTHROCYTE [DISTWIDTH] IN BLOOD BY AUTOMATED COUNT: 15 % (ref 11.5–14.5)
GLUCOSE SERPL-MCNC: 93 MG/DL (ref 74–106)
HCT VFR BLD CALC: 28.9 % (ref 36–48)
HGB BLD-MCNC: 9.6 G/DL (ref 12–16)
IMM GRANULOCYTES NFR BLD: 1 % (ref 0–5)
LYMPHOCYTES NFR BLD AUTO: 27.4 % (ref 15–50)
MCH RBC QN AUTO: 27 PG (ref 26–34)
MCHC RBC AUTO-ENTMCNC: 33.2 G/DL (ref 31–37)
MCV RBC: 81.4 FL (ref 80–100)
MONOCYTES NFR BLD: 12.4 % (ref 2–11)
NEUTROPHILS NFR BLD AUTO: 55.1 % (ref 40–80)
OSMOLALITY SERPL CALC.SUM OF ELEC: 281 MOSM/KG (ref 275–300)
PLATELET # BLD: 273 10X3/UL (ref 130–400)
PMV BLD AUTO: 9.6 FL (ref 7.4–10.4)
POTASSIUM SERPL-SCNC: 3.3 MMOL/L (ref 3.5–5.1)
RBC # BLD AUTO: 3.55 10X6/UL (ref 4–5.4)
SODIUM SERPL-SCNC: 140 MMOL/L (ref 136–145)
WBC # BLD AUTO: 8.7 10X3/UL (ref 4.8–10.8)

## 2018-12-14 VITALS — DIASTOLIC BLOOD PRESSURE: 44 MMHG | SYSTOLIC BLOOD PRESSURE: 115 MMHG

## 2018-12-14 VITALS — DIASTOLIC BLOOD PRESSURE: 68 MMHG | SYSTOLIC BLOOD PRESSURE: 1471 MMHG

## 2019-01-10 ENCOUNTER — HOSPITAL ENCOUNTER (INPATIENT)
Dept: HOSPITAL 84 - D.ER | Age: 74
LOS: 25 days | Discharge: NURSING, REHAB OR LONG-TERM CARE | DRG: 474 | End: 2019-02-04
Attending: FAMILY MEDICINE | Admitting: FAMILY MEDICINE
Payer: MEDICARE

## 2019-01-10 VITALS
HEIGHT: 63 IN | WEIGHT: 287 LBS | BODY MASS INDEX: 50.85 KG/M2 | BODY MASS INDEX: 50.85 KG/M2 | BODY MASS INDEX: 50.85 KG/M2 | BODY MASS INDEX: 50.85 KG/M2 | HEIGHT: 63 IN | WEIGHT: 287 LBS

## 2019-01-10 VITALS — SYSTOLIC BLOOD PRESSURE: 129 MMHG | DIASTOLIC BLOOD PRESSURE: 46 MMHG

## 2019-01-10 VITALS — DIASTOLIC BLOOD PRESSURE: 46 MMHG | SYSTOLIC BLOOD PRESSURE: 126 MMHG

## 2019-01-10 DIAGNOSIS — N39.0: ICD-10-CM

## 2019-01-10 DIAGNOSIS — K44.9: ICD-10-CM

## 2019-01-10 DIAGNOSIS — R65.20: ICD-10-CM

## 2019-01-10 DIAGNOSIS — N17.9: ICD-10-CM

## 2019-01-10 DIAGNOSIS — A41.9: ICD-10-CM

## 2019-01-10 DIAGNOSIS — E66.01: ICD-10-CM

## 2019-01-10 DIAGNOSIS — K25.9: ICD-10-CM

## 2019-01-10 DIAGNOSIS — T84.54XA: Primary | ICD-10-CM

## 2019-01-10 DIAGNOSIS — F32.89: ICD-10-CM

## 2019-01-10 DIAGNOSIS — A04.72: ICD-10-CM

## 2019-01-10 DIAGNOSIS — E72.12: ICD-10-CM

## 2019-01-10 LAB
ALBUMIN SERPL-MCNC: 2 G/DL (ref 3.4–5)
ALP SERPL-CCNC: 92 U/L (ref 46–116)
ALT SERPL-CCNC: 21 U/L (ref 10–68)
ANION GAP SERPL CALC-SCNC: 12.5 MMOL/L (ref 8–16)
APPEARANCE UR: (no result)
APTT BLD: 38.7 SECONDS (ref 22.8–39.4)
BASOPHILS NFR BLD AUTO: 0.3 % (ref 0–2)
BILIRUB SERPL-MCNC: 0.49 MG/DL (ref 0.2–1.3)
BILIRUB SERPL-MCNC: NEGATIVE MG/DL
BUN SERPL-MCNC: 37 MG/DL (ref 7–18)
CALCIUM SERPL-MCNC: 8.2 MG/DL (ref 8.5–10.1)
CHLORIDE SERPL-SCNC: 99 MMOL/L (ref 98–107)
CK MB SERPL-MCNC: 0 U/L (ref 0–3.6)
CK SERPL-CCNC: 82 UL (ref 21–215)
CO2 SERPL-SCNC: 28.8 MMOL/L (ref 21–32)
COLOR UR: YELLOW
CREAT SERPL-MCNC: 3.7 MG/DL (ref 0.6–1.3)
EOSINOPHIL NFR BLD: 0.7 % (ref 0–7)
ERYTHROCYTE [DISTWIDTH] IN BLOOD BY AUTOMATED COUNT: 15.9 % (ref 11.5–14.5)
GLOBULIN SER-MCNC: 3.3 G/L
GLUCOSE SERPL-MCNC: 117 MG/DL (ref 74–106)
GLUCOSE SERPL-MCNC: NEGATIVE MG/DL
HCT VFR BLD CALC: 26.4 % (ref 36–48)
HGB BLD-MCNC: 8.9 G/DL (ref 12–16)
IMM GRANULOCYTES NFR BLD: 0.5 % (ref 0–5)
INR PPP: 1.92 (ref 0.85–1.17)
KETONES UR STRIP-MCNC: NEGATIVE MG/DL
LYMPHOCYTES NFR BLD AUTO: 5.9 % (ref 15–50)
MCH RBC QN AUTO: 27.6 PG (ref 26–34)
MCHC RBC AUTO-ENTMCNC: 33.7 G/DL (ref 31–37)
MCV RBC: 81.7 FL (ref 80–100)
MONOCYTES NFR BLD: 6.2 % (ref 2–11)
NEUTROPHILS NFR BLD AUTO: 86.4 % (ref 40–80)
NITRITE UR-MCNC: NEGATIVE MG/ML
OSMOLALITY SERPL CALC.SUM OF ELEC: 281 MOSM/KG (ref 275–300)
PH UR STRIP: 6 [PH] (ref 5–6)
PLATELET # BLD: 236 10X3/UL (ref 130–400)
PMV BLD AUTO: 9.4 FL (ref 7.4–10.4)
POTASSIUM SERPL-SCNC: 4.3 MMOL/L (ref 3.5–5.1)
PROT SERPL-MCNC: 5.3 G/DL (ref 6.4–8.2)
PROT UR-MCNC: (no result) MG/DL
PROTHROMBIN TIME: 21.3 SECONDS (ref 11.6–15)
RBC # BLD AUTO: 3.23 10X6/UL (ref 4–5.4)
RBC #/AREA URNS HPF: (no result) /HPF (ref 0–5)
SODIUM SERPL-SCNC: 136 MMOL/L (ref 136–145)
SP GR UR STRIP: 1.01 (ref 1–1.02)
SQUAMOUS #/AREA URNS HPF: (no result) /HPF (ref 0–5)
TROPONIN I SERPL-MCNC: < 0.017 NG/ML (ref 0–0.06)
UROBILINOGEN UR-MCNC: NORMAL MG/DL
WBC # BLD AUTO: 16 10X3/UL (ref 4.8–10.8)
WBC #/AREA URNS HPF: (no result) /HPF (ref 0–5)

## 2019-01-10 NOTE — MORECARE
CASE MANAGEMENT DISCHARGE SUMMARY
 
 
PATIENT: JOSEFA LEWIS                 UNIT: [id number]
ACCOUNT#: [id number]                       ADM DATE: 01/10/19
AGE: 73     : 43  SEX: F            ROOM/BED: D.E07     
AUTHOR: KJ LARA                             PHYSICIAN:                               
 
REFERRING PHYSICIAN: MARLON BOO MD                
DATE OF SERVICE: 01/10/19
Discharge Plan
 
 
Patient Name: JOSEFA LEWIS
Facility: [facility]:[place]
Encounter #: [id number]
Medical Record #: [id number]
: 1945
Planned Disposition: 
Anticipated Discharge Date: 19
 
Discharge Date: 
Expected LOS: 4
Initial Reviewer: [id number]
Initial Review Date: 01/10/2019
Generated: 1/10/19   5:27 pm 
 DCPIA - Discharge Planning Initial Assessment
 
Updated by [id number]: Marcella Canales on 1/10/19   4:27 pm
*  Is the patient Alert and Oriented?
Yes
*  How many steps to enter\exit or inside your home? none *  PCP Dr. Primo Santoro * 
Pharmacy
[facility]
*  Preadmission Environment
Skilled Nursing Facility
*  Facility Name
[facility]
*  ADLs
Partial Dependent
*  Partial ADLs (Assistance needed)
Ambulation
Bathing
Dressing
Medication Management
Transfers
*  Equipment
Wheelchair
*  List name and contact numbers for known caregivers / representatives who 
currently or will assist patient after discharge:
 
Kashif Lewis - spouse - [phone]
*  Verbal permission to speak to the caregivers and representatives has been 
obtained from the patient.
Yes
*  Community resources currently utilized
None
*  Additional services required to return to the preadmission environment?
No
*  Can the patient safely return to the preadmission environment?
Yes
*  Has this patient been hospitalized within the prior 30 days at any 
hospital?
Yes
 
 
 
 
 
 
Patient Name: JOSEFA LEWIS
Encounter #: [id number]
Page [phone]
 
 
 
 
 
Electronically Signed by KJ LARA on 01/10/19 at 1627
 
 
 
 
 
 
**All edits/amendments must be made on the electronic document**
 
DICTATION DATE: 01/10/19 1627     : CONNIE  01/10/19 1627     
RPT#: [id number]                                DC DATE:        
                                               STATUS: ADM IN  
[facility]
[address] [place] [address]
***END OF REPORT***

## 2019-01-10 NOTE — MORECARE
CASE MANAGEMENT DISCHARGE SUMMARY
 
 
PATIENT: JOSEFA LEWIS                 UNIT: [id number]
ACCOUNT#: [id number]                       ADM DATE: 01/10/19
AGE: 73     : 43  SEX: F            ROOM/BED: D.E07     
AUTHOR: JANE,DOC                             PHYSICIAN:                               
 
REFERRING PHYSICIAN: MARLON BOO MD                
DATE OF SERVICE: 01/10/19
Discharge Plan
 
 
Patient Name: JOSEFA LEWIS
Facility: [facility]:[place]
Encounter #: [id number]
Medical Record #: [id number]
: 1945
Planned Disposition: 
Anticipated Discharge Date: 19
 
Discharge Date: 
Expected LOS: 4
Initial Reviewer: [id number]
Initial Review Date: 01/10/2019
Generated: 1/10/19   5:36 pm 
DCP- Discharge Planning
 
Updated by [id number]: Marcella Canales on 1/10/19   3:30 pm CT
Patient Name: JOSEFA LEWIS                                    
Admission Status: ER  
Accout number: [id number]                             
Admission Date: 01-  
: 1945                                                       
Admission Diagnosis:  
Attending: MARLON BOO                                               
Current LOS:  1  
 
Anticipated DC Date: 2019  
Planned Disposition:   
Primary Insurance: MEDICARE A & B  
 
 
Discharge Planning Comments:  
 
CM met with patient to complete initial dc planning assessment.  CM educated 
patient on the CM role and verbal consent given by patient to complete 
assessment.   Patient lives at home with her spouse but was currently at [facility] at the [facility] for therapy. Patient reports she has been in and 
out of the hopstial/rehab since April with infected knee.  At discharge 
 
patient plans to return to [facility] to complete her rehab and feels this 
is a safe discharge.  Patient reports she is not able to ambulate due to her 
knee and is wheelchair bound. Patient denied known discharge needs at this 
time. CM will continue to follow and will assist as needed with dc 
plans/needs.   
 
: Marcella Canales RN, [facility]
 DCPIA - Discharge Planning Initial Assessment
 
Updated by [id number]: Marcella Canales on 1/10/19   4:27 pm
*  Is the patient Alert and Oriented?
Yes
*  How many steps to enter\exit or inside your home? none *  PCP Dr. Primo Santoro * 
Pharmacy
[facility]
*  Preadmission Environment
Skilled Nursing Facility
*  Facility Name
[facility]
*  ADLs
Partial Dependent
*  Partial ADLs (Assistance needed)
Ambulation
Bathing
Dressing
Medication Management
Transfers
*  Equipment
Wheelchair
*  List name and contact numbers for known caregivers / representatives who 
currently or will assist patient after discharge:
Kashif Lewis - spouse - [phone]
*  Verbal permission to speak to the caregivers and representatives has been 
obtained from the patient.
Yes
*  Community resources currently utilized
None
*  Additional services required to return to the preadmission environment?
No
*  Can the patient safely return to the preadmission environment?
Yes
*  Has this patient been hospitalized within the prior 30 days at any 
hospital?
Yes
 
 
 
 
 
 
 
Last DP export: 1/10/19   3:27 p
 
Patient Name: JOSEFA LEWIS
Encounter #: [id number]
Page [phone]
 
 
 
 
 
Electronically Signed by KJ LARA on 01/10/19 at 1636
 
 
 
 
 
 
**All edits/amendments must be made on the electronic document**
 
DICTATION DATE: 01/10/19 1636     : CONNIE  01/10/19 1636     
RPT#: [id number]                                DC DATE:        
                                               STATUS: ADM IN  
[facility]
 [place] [address]
***END OF REPORT***

## 2019-01-11 VITALS — DIASTOLIC BLOOD PRESSURE: 69 MMHG | SYSTOLIC BLOOD PRESSURE: 128 MMHG

## 2019-01-11 VITALS — SYSTOLIC BLOOD PRESSURE: 117 MMHG | DIASTOLIC BLOOD PRESSURE: 47 MMHG

## 2019-01-11 VITALS — DIASTOLIC BLOOD PRESSURE: 49 MMHG | SYSTOLIC BLOOD PRESSURE: 118 MMHG

## 2019-01-11 VITALS — DIASTOLIC BLOOD PRESSURE: 64 MMHG | SYSTOLIC BLOOD PRESSURE: 112 MMHG

## 2019-01-11 VITALS — DIASTOLIC BLOOD PRESSURE: 45 MMHG | SYSTOLIC BLOOD PRESSURE: 96 MMHG

## 2019-01-11 VITALS — SYSTOLIC BLOOD PRESSURE: 93 MMHG | DIASTOLIC BLOOD PRESSURE: 46 MMHG

## 2019-01-11 LAB
ALBUMIN SERPL-MCNC: 1.7 G/DL (ref 3.4–5)
ALP SERPL-CCNC: 79 U/L (ref 46–116)
ALT SERPL-CCNC: 21 U/L (ref 10–68)
ANION GAP SERPL CALC-SCNC: 13.2 MMOL/L (ref 8–16)
APPEARANCE UR: CLEAR
BACTERIA #/AREA URNS HPF: (no result) /HPF
BASOPHILS NFR BLD AUTO: 0.7 % (ref 0–2)
BILIRUB SERPL-MCNC: 0.54 MG/DL (ref 0.2–1.3)
BILIRUB SERPL-MCNC: NEGATIVE MG/DL
BUN SERPL-MCNC: 35 MG/DL (ref 7–18)
CALCIUM SERPL-MCNC: 8.5 MG/DL (ref 8.5–10.1)
CHLORIDE SERPL-SCNC: 103 MMOL/L (ref 98–107)
CO2 SERPL-SCNC: 26.9 MMOL/L (ref 21–32)
COLOR UR: YELLOW
CREAT SERPL-MCNC: 3.2 MG/DL (ref 0.6–1.3)
CREATININE - URINE: 100.7 MG/DL (ref 30–125)
EOSINOPHIL NFR BLD: 1.7 % (ref 0–7)
ERYTHROCYTE [DISTWIDTH] IN BLOOD BY AUTOMATED COUNT: 16 % (ref 11.5–14.5)
ERYTHROCYTE [SEDIMENTATION RATE] IN BLOOD: 49 MM/HR (ref 0–30)
GLOBULIN SER-MCNC: 3.5 G/L
GLUCOSE SERPL-MCNC: 81 MG/DL (ref 74–106)
GLUCOSE SERPL-MCNC: NEGATIVE MG/DL
HCT VFR BLD CALC: 24 % (ref 36–48)
HGB BLD-MCNC: 8 G/DL (ref 12–16)
IMM GRANULOCYTES NFR BLD: 0.5 % (ref 0–5)
IRON SERPL-MCNC: 26 UG/DL (ref 35–150)
KETONES UR STRIP-MCNC: NEGATIVE MG/DL
LYMPHOCYTES NFR BLD AUTO: 13.5 % (ref 15–50)
MCH RBC QN AUTO: 27.2 PG (ref 26–34)
MCHC RBC AUTO-ENTMCNC: 33.3 G/DL (ref 31–37)
MCV RBC: 81.6 FL (ref 80–100)
MONOCYTES NFR BLD: 11.5 % (ref 2–11)
NEUTROPHILS NFR BLD AUTO: 72.1 % (ref 40–80)
NITRITE UR-MCNC: NEGATIVE MG/ML
OSMOLALITY SERPL CALC.SUM OF ELEC: 284 MOSM/KG (ref 275–300)
PH UR STRIP: 5 [PH] (ref 5–6)
PLATELET # BLD: 213 10X3/UL (ref 130–400)
PMV BLD AUTO: 9.4 FL (ref 7.4–10.4)
POTASSIUM SERPL-SCNC: 4.1 MMOL/L (ref 3.5–5.1)
PROT SERPL-MCNC: 5.2 G/DL (ref 6.4–8.2)
PROT UR-MCNC: 51.5 MG/DL (ref 0–11.9)
PROT UR-MCNC: NEGATIVE MG/DL
PROT/CREAT UR: 0.5 MG/G
RBC # BLD AUTO: 2.94 10X6/UL (ref 4–5.4)
RBC #/AREA URNS HPF: (no result) /HPF (ref 0–5)
SAO2 % BLD FROM PO2: 21 % (ref 15–55)
SODIUM SERPL-SCNC: 139 MMOL/L (ref 136–145)
SODIUM UR-SCNC: 37 MMOL/L (ref 20–110)
SP GR UR STRIP: 1.01 (ref 1–1.02)
SQUAMOUS #/AREA URNS HPF: (no result) /HPF (ref 0–5)
TIBC SERPL-MCNC: 122 UG/DL (ref 260–445)
UIBC SERPL-MCNC: 96 UG/DL (ref 150–375)
UROBILINOGEN UR-MCNC: NORMAL MG/DL
VANCOMYCIN SERPL-MCNC: 7.9 UG/ML (ref 10–20)
WBC # BLD AUTO: 8.2 10X3/UL (ref 4.8–10.8)
WBC #/AREA URNS HPF: >50 /HPF (ref 0–5)

## 2019-01-12 VITALS — SYSTOLIC BLOOD PRESSURE: 90 MMHG | DIASTOLIC BLOOD PRESSURE: 49 MMHG

## 2019-01-12 VITALS — SYSTOLIC BLOOD PRESSURE: 94 MMHG | DIASTOLIC BLOOD PRESSURE: 44 MMHG

## 2019-01-12 VITALS — DIASTOLIC BLOOD PRESSURE: 45 MMHG | SYSTOLIC BLOOD PRESSURE: 108 MMHG

## 2019-01-12 VITALS — DIASTOLIC BLOOD PRESSURE: 48 MMHG | SYSTOLIC BLOOD PRESSURE: 94 MMHG

## 2019-01-12 VITALS — DIASTOLIC BLOOD PRESSURE: 40 MMHG | SYSTOLIC BLOOD PRESSURE: 106 MMHG

## 2019-01-12 LAB
ALBUMIN SERPL-MCNC: 1.6 G/DL (ref 3.4–5)
ALP SERPL-CCNC: 78 U/L (ref 46–116)
ALT SERPL-CCNC: 20 U/L (ref 10–68)
ANION GAP SERPL CALC-SCNC: 12.2 MMOL/L (ref 8–16)
BASOPHILS NFR BLD AUTO: 0.5 % (ref 0–2)
BILIRUB SERPL-MCNC: 0.67 MG/DL (ref 0.2–1.3)
BUN SERPL-MCNC: 33 MG/DL (ref 7–18)
CALCIUM SERPL-MCNC: 8.4 MG/DL (ref 8.5–10.1)
CHLORIDE SERPL-SCNC: 102 MMOL/L (ref 98–107)
CO2 SERPL-SCNC: 26.4 MMOL/L (ref 21–32)
CREAT SERPL-MCNC: 3.3 MG/DL (ref 0.6–1.3)
EOSINOPHIL NFR BLD: 2.7 % (ref 0–7)
ERYTHROCYTE [DISTWIDTH] IN BLOOD BY AUTOMATED COUNT: 16.6 % (ref 11.5–14.5)
GLOBULIN SER-MCNC: 3.5 G/L
GLUCOSE SERPL-MCNC: 84 MG/DL (ref 74–106)
HCT VFR BLD CALC: 21.5 % (ref 36–48)
HGB BLD-MCNC: 7.2 G/DL (ref 12–16)
IMM GRANULOCYTES NFR BLD: 0.5 % (ref 0–5)
LYMPHOCYTES NFR BLD AUTO: 22.6 % (ref 15–50)
MCH RBC QN AUTO: 27.3 PG (ref 26–34)
MCHC RBC AUTO-ENTMCNC: 33.5 G/DL (ref 31–37)
MCV RBC: 81.4 FL (ref 80–100)
MONOCYTES NFR BLD: 15.1 % (ref 2–11)
NEUTROPHILS NFR BLD AUTO: 58.6 % (ref 40–80)
OSMOLALITY SERPL CALC.SUM OF ELEC: 279 MOSM/KG (ref 275–300)
PLATELET # BLD: 189 10X3/UL (ref 130–400)
PMV BLD AUTO: 9.5 FL (ref 7.4–10.4)
POTASSIUM SERPL-SCNC: 3.6 MMOL/L (ref 3.5–5.1)
PROT SERPL-MCNC: 5.1 G/DL (ref 6.4–8.2)
RBC # BLD AUTO: 2.64 10X6/UL (ref 4–5.4)
SODIUM SERPL-SCNC: 137 MMOL/L (ref 136–145)
WBC # BLD AUTO: 6.3 10X3/UL (ref 4.8–10.8)

## 2019-01-13 VITALS — DIASTOLIC BLOOD PRESSURE: 47 MMHG | SYSTOLIC BLOOD PRESSURE: 117 MMHG

## 2019-01-13 VITALS — DIASTOLIC BLOOD PRESSURE: 47 MMHG | SYSTOLIC BLOOD PRESSURE: 99 MMHG

## 2019-01-13 VITALS — DIASTOLIC BLOOD PRESSURE: 48 MMHG | SYSTOLIC BLOOD PRESSURE: 111 MMHG

## 2019-01-13 VITALS — DIASTOLIC BLOOD PRESSURE: 42 MMHG | SYSTOLIC BLOOD PRESSURE: 123 MMHG

## 2019-01-13 VITALS — DIASTOLIC BLOOD PRESSURE: 64 MMHG | SYSTOLIC BLOOD PRESSURE: 125 MMHG

## 2019-01-13 LAB
ALBUMIN SERPL-MCNC: 1.6 G/DL (ref 3.4–5)
ALP SERPL-CCNC: 72 U/L (ref 46–116)
ALT SERPL-CCNC: 18 U/L (ref 10–68)
ANION GAP SERPL CALC-SCNC: 11.6 MMOL/L (ref 8–16)
BASOPHILS NFR BLD AUTO: 0.7 % (ref 0–2)
BILIRUB SERPL-MCNC: 0.51 MG/DL (ref 0.2–1.3)
BUN SERPL-MCNC: 31 MG/DL (ref 7–18)
CALCIUM SERPL-MCNC: 8.4 MG/DL (ref 8.5–10.1)
CHLORIDE SERPL-SCNC: 106 MMOL/L (ref 98–107)
CO2 SERPL-SCNC: 27.7 MMOL/L (ref 21–32)
CREAT SERPL-MCNC: 2.9 MG/DL (ref 0.6–1.3)
EOSINOPHIL NFR BLD: 7.8 % (ref 0–7)
ERYTHROCYTE [DISTWIDTH] IN BLOOD BY AUTOMATED COUNT: 15.5 % (ref 11.5–14.5)
GLOBULIN SER-MCNC: 3.4 G/L
GLUCOSE SERPL-MCNC: 83 MG/DL (ref 74–106)
HCT VFR BLD CALC: 27 % (ref 36–48)
HGB BLD-MCNC: 9.2 G/DL (ref 12–16)
IMM GRANULOCYTES NFR BLD: 0.5 % (ref 0–5)
LYMPHOCYTES NFR BLD AUTO: 21.2 % (ref 15–50)
MCH RBC QN AUTO: 27.9 PG (ref 26–34)
MCHC RBC AUTO-ENTMCNC: 34.1 G/DL (ref 31–37)
MCV RBC: 81.8 FL (ref 80–100)
MONOCYTES NFR BLD: 17 % (ref 2–11)
NEUTROPHILS NFR BLD AUTO: 52.8 % (ref 40–80)
OSMOLALITY SERPL CALC.SUM OF ELEC: 288 MOSM/KG (ref 275–300)
PLATELET # BLD: 175 10X3/UL (ref 130–400)
PMV BLD AUTO: 9.5 FL (ref 7.4–10.4)
POTASSIUM SERPL-SCNC: 3.3 MMOL/L (ref 3.5–5.1)
PROT SERPL-MCNC: 5 G/DL (ref 6.4–8.2)
RBC # BLD AUTO: 3.3 10X6/UL (ref 4–5.4)
SODIUM SERPL-SCNC: 142 MMOL/L (ref 136–145)
WBC # BLD AUTO: 5.7 10X3/UL (ref 4.8–10.8)

## 2019-01-14 VITALS — SYSTOLIC BLOOD PRESSURE: 116 MMHG | DIASTOLIC BLOOD PRESSURE: 58 MMHG

## 2019-01-14 VITALS — SYSTOLIC BLOOD PRESSURE: 119 MMHG | DIASTOLIC BLOOD PRESSURE: 56 MMHG

## 2019-01-14 VITALS — DIASTOLIC BLOOD PRESSURE: 67 MMHG | SYSTOLIC BLOOD PRESSURE: 157 MMHG

## 2019-01-14 VITALS — DIASTOLIC BLOOD PRESSURE: 48 MMHG | SYSTOLIC BLOOD PRESSURE: 128 MMHG

## 2019-01-14 VITALS — SYSTOLIC BLOOD PRESSURE: 128 MMHG | DIASTOLIC BLOOD PRESSURE: 51 MMHG

## 2019-01-14 VITALS — DIASTOLIC BLOOD PRESSURE: 51 MMHG | SYSTOLIC BLOOD PRESSURE: 138 MMHG

## 2019-01-14 VITALS — SYSTOLIC BLOOD PRESSURE: 122 MMHG | DIASTOLIC BLOOD PRESSURE: 56 MMHG

## 2019-01-14 VITALS — DIASTOLIC BLOOD PRESSURE: 57 MMHG | SYSTOLIC BLOOD PRESSURE: 116 MMHG

## 2019-01-14 VITALS — SYSTOLIC BLOOD PRESSURE: 111 MMHG | DIASTOLIC BLOOD PRESSURE: 47 MMHG

## 2019-01-14 VITALS — SYSTOLIC BLOOD PRESSURE: 148 MMHG | DIASTOLIC BLOOD PRESSURE: 67 MMHG

## 2019-01-14 VITALS — SYSTOLIC BLOOD PRESSURE: 137 MMHG | DIASTOLIC BLOOD PRESSURE: 69 MMHG

## 2019-01-14 LAB
ALBUMIN SERPL ELPH-MCNC: 2.1 G/DL (ref 2.9–4.4)
ALBUMIN SERPL-MCNC: 1.6 G/DL (ref 3.4–5)
ALBUMIN/GLOB SERPL: 0.9 {RATIO} (ref 0.7–1.7)
ALP SERPL-CCNC: 66 U/L (ref 46–116)
ALPHA1 GLOB SERPL ELPH-MCNC: 0.2 G/DL (ref 0–0.4)
ALPHA2 GLOB SERPL ELPH-MCNC: 0.7 G/DL (ref 0.4–1)
ALT SERPL-CCNC: 18 U/L (ref 10–68)
ANION GAP SERPL CALC-SCNC: 9.1 MMOL/L (ref 8–16)
B-GLOBULIN SERPL ELPH-MCNC: 0.9 G/DL (ref 0.7–1.3)
BASOPHILS NFR BLD AUTO: 0.3 % (ref 0–2)
BILIRUB SERPL-MCNC: 0.37 MG/DL (ref 0.2–1.3)
BUN SERPL-MCNC: 24 MG/DL (ref 7–18)
CALCIUM SERPL-MCNC: 8.3 MG/DL (ref 8.5–10.1)
CHLORIDE SERPL-SCNC: 107 MMOL/L (ref 98–107)
CO2 SERPL-SCNC: 28 MMOL/L (ref 21–32)
CREAT SERPL-MCNC: 2.4 MG/DL (ref 0.6–1.3)
EOSINOPHIL NFR BLD: 5.8 % (ref 0–7)
ERYTHROCYTE [DISTWIDTH] IN BLOOD BY AUTOMATED COUNT: 15.5 % (ref 11.5–14.5)
GAMMA GLOB SERPL ELPH-MCNC: 0.6 G/DL (ref 0.4–1.8)
GLOBULIN SER-MCNC: 3.5 G/L
GLUCOSE SERPL-MCNC: 104 MG/DL (ref 74–106)
HCT VFR BLD CALC: 27.7 % (ref 36–48)
HGB BLD-MCNC: 9.2 G/DL (ref 12–16)
IMM GRANULOCYTES NFR BLD: 0.9 % (ref 0–5)
INR PPP: 1.47 (ref 0.85–1.17)
LYMPHOCYTES NFR BLD AUTO: 26.1 % (ref 15–50)
MCH RBC QN AUTO: 27.5 PG (ref 26–34)
MCHC RBC AUTO-ENTMCNC: 33.2 G/DL (ref 31–37)
MCV RBC: 82.7 FL (ref 80–100)
MONOCYTES NFR BLD: 10.8 % (ref 2–11)
NEUTROPHILS NFR BLD AUTO: 56.1 % (ref 40–80)
OSMOLALITY SERPL CALC.SUM OF ELEC: 284 MOSM/KG (ref 275–300)
PLATELET # BLD: 187 10X3/UL (ref 130–400)
PMV BLD AUTO: 10.1 FL (ref 7.4–10.4)
POTASSIUM SERPL-SCNC: 3.1 MMOL/L (ref 3.5–5.1)
PROT SERPL-MCNC: 4.5 G/DL (ref 6–8.5)
PROT SERPL-MCNC: 5.1 G/DL (ref 6.4–8.2)
PROTHROMBIN TIME: 17.2 SECONDS (ref 11.6–15)
RBC # BLD AUTO: 3.35 10X6/UL (ref 4–5.4)
SODIUM SERPL-SCNC: 141 MMOL/L (ref 136–145)
WBC # BLD AUTO: 6.7 10X3/UL (ref 4.8–10.8)

## 2019-01-14 PROCEDURE — 0Y6D0Z3 DETACHMENT AT LEFT UPPER LEG, LOW, OPEN APPROACH: ICD-10-PCS | Performed by: ORTHOPAEDIC SURGERY

## 2019-01-15 VITALS — SYSTOLIC BLOOD PRESSURE: 106 MMHG | DIASTOLIC BLOOD PRESSURE: 57 MMHG

## 2019-01-15 VITALS — DIASTOLIC BLOOD PRESSURE: 62 MMHG | SYSTOLIC BLOOD PRESSURE: 113 MMHG

## 2019-01-15 VITALS — DIASTOLIC BLOOD PRESSURE: 61 MMHG | SYSTOLIC BLOOD PRESSURE: 120 MMHG

## 2019-01-15 VITALS — DIASTOLIC BLOOD PRESSURE: 53 MMHG | SYSTOLIC BLOOD PRESSURE: 117 MMHG

## 2019-01-15 VITALS — DIASTOLIC BLOOD PRESSURE: 41 MMHG | SYSTOLIC BLOOD PRESSURE: 101 MMHG

## 2019-01-15 VITALS — DIASTOLIC BLOOD PRESSURE: 42 MMHG | SYSTOLIC BLOOD PRESSURE: 92 MMHG

## 2019-01-15 LAB
ALBUMIN MFR UR ELPH: 12.4 %
ALBUMIN SERPL-MCNC: 1.6 G/DL (ref 3.4–5)
ALP SERPL-CCNC: 63 U/L (ref 46–116)
ALPHA1 GLOB MFR UR ELPH: 3.1 %
ALPHA2 GLOB 24H MFR UR ELPH: 13.2 %
ALT SERPL-CCNC: 21 U/L (ref 10–68)
ANION GAP SERPL CALC-SCNC: 11.3 MMOL/L (ref 8–16)
B-GLOBULIN 24H MFR UR ELPH: 33.3 %
BASOPHILS NFR BLD AUTO: 0.3 % (ref 0–2)
BILIRUB SERPL-MCNC: 0.49 MG/DL (ref 0.2–1.3)
BUN SERPL-MCNC: 25 MG/DL (ref 7–18)
CALCIUM SERPL-MCNC: 8.1 MG/DL (ref 8.5–10.1)
CHLORIDE SERPL-SCNC: 108 MMOL/L (ref 98–107)
CO2 SERPL-SCNC: 25.1 MMOL/L (ref 21–32)
CREAT SERPL-MCNC: 2.6 MG/DL (ref 0.6–1.3)
EOSINOPHIL NFR BLD: 0.2 % (ref 0–7)
ERYTHROCYTE [DISTWIDTH] IN BLOOD BY AUTOMATED COUNT: 15.5 % (ref 11.5–14.5)
GAMMA GLOB 24H MFR UR ELPH: 38 %
GLOBULIN SER-MCNC: 3.3 G/L
GLUCOSE SERPL-MCNC: 138 MG/DL (ref 74–106)
HCT VFR BLD CALC: 27 % (ref 36–48)
HGB BLD-MCNC: 9.2 G/DL (ref 12–16)
IMM GRANULOCYTES NFR BLD: 1.8 % (ref 0–5)
LYMPHOCYTES NFR BLD AUTO: 7.8 % (ref 15–50)
MCH RBC QN AUTO: 28.7 PG (ref 26–34)
MCHC RBC AUTO-ENTMCNC: 34.1 G/DL (ref 31–37)
MCV RBC: 84.1 FL (ref 80–100)
MONOCYTES NFR BLD: 8 % (ref 2–11)
NEUTROPHILS NFR BLD AUTO: 81.9 % (ref 40–80)
OSMOLALITY SERPL CALC.SUM OF ELEC: 284 MOSM/KG (ref 275–300)
PLATELET # BLD: 194 10X3/UL (ref 130–400)
PMV BLD AUTO: 9.6 FL (ref 7.4–10.4)
POTASSIUM SERPL-SCNC: 4.4 MMOL/L (ref 3.5–5.1)
PROT SERPL-MCNC: 4.9 G/DL (ref 6.4–8.2)
RBC # BLD AUTO: 3.21 10X6/UL (ref 4–5.4)
SODIUM SERPL-SCNC: 140 MMOL/L (ref 136–145)
WBC # BLD AUTO: 17.9 10X3/UL (ref 4.8–10.8)

## 2019-01-15 NOTE — MORECARE
CASE MANAGEMENT DISCHARGE SUMMARY
 
 
PATIENT: JOSEFA LEWIS                 UNIT: [id number]
ACCOUNT#: [id number]                       ADM DATE: 01/10/19
AGE: 73     : 43  SEX: F            ROOM/BED: D.2215    
AUTHOR: KJ LARA                             PHYSICIAN:                               
 
REFERRING PHYSICIAN: MARLON BOO MD                
DATE OF SERVICE: 01/15/19
Discharge Plan
 
 
Patient Name: JOSEFA LEWIS
Facility: [facility]:[place]
Encounter #: [id number]
Medical Record #: [id number]
: 1945
Planned Disposition: 
Anticipated Discharge Date: 19
 
Discharge Date: 
Expected LOS: 4
Initial Reviewer: [email or web address]
Initial Review Date: 01/10/2019
Generated: 1/15/19   4:54 pm 
Comments
 
DCP- Discharge Planning
 
Updated by [email or web address]: Ro Zarate on 1/15/19   2:48 pm CT
REFERRAL SENT TO [facility], I SPOKE WITH YAMILE  
FAX # [phone]  
[facility] # [phone]
DCP- Discharge Planning
 
Updated by [id number]: Ro Zarate on 1/15/19   2:36 pm CT
MET WITH PATIENT SHE WOULD LIKE TO GO TO [facility] TO THEIR INPATIENT 
FACILITY
DCP- Discharge Planning
 
Updated by [id number]: Marcella Canales on 1/10/19   3:30 pm CT
Patient Name: JOSEFA LEWIS                                    
Admission Status: ER  
Accout number: [id number]                             
Admission Date: 01-  
: 1945                                                       
Admission Diagnosis:  
Attending: MARLON BOO                                               
Current LOS:  1  
 
 
Anticipated DC Date: 2019  
Planned Disposition:   
Primary Insurance: MEDICARE A & B  
 
 
Discharge Planning Comments:  
 
CM met with patient to complete initial dc planning assessment.  CM educated 
patient on the CM role and verbal consent given by patient to complete 
assessment.   Patient lives at home with her spouse but was currently at [facility] for therapy. Patient reports she has been in and 
out of the hopstial/rehab since April with infected knee.  At discharge 
patient plans to return to [facility] to complete her rehab and feels this 
is a safe discharge.  Patient reports she is not able to ambulate due to her 
knee and is wheelchair bound. Patient denied known discharge needs at this 
time. CM will continue to follow and will assist as needed with dc 
plans/needs.   
 
: Marcella Canales RN, [facility]
 DCPIA - Discharge Planning Initial Assessment
 
Updated by [id number]: Marcella Canales on 1/10/19   4:27 pm
*  Is the patient Alert and Oriented?
Yes
*  How many steps to enter\exit or inside your home? none *  PCP Dr. Primo Santoro * 
Pharmacy
[facility]
*  Preadmission Environment
Skilled Nursing Facility
*  Facility Name
[facility]
*  ADLs
Partial Dependent
*  Partial ADLs (Assistance needed)
Ambulation
Bathing
Dressing
Medication Management
Transfers
*  Equipment
Wheelchair
*  List name and contact numbers for known caregivers / representatives who 
currently or will assist patient after discharge:
Kashif Lewis - spouse - [phone]
*  Verbal permission to speak to the caregivers and representatives has been 
obtained from the patient.
Yes
*  Community resources currently utilized
None
*  Additional services required to return to the preadmission environment?
No
 
*  Can the patient safely return to the preadmission environment?
Yes
*  Has this patient been hospitalized within the prior 30 days at any 
hospital?
Yes
 
 
 
 
 
 
 
Last DP export: 1/15/19   2:37 p
Patient Name: JOSEFA LEWIS
Encounter #: [id number]
Page [phone]
 
 
 
 
 
Electronically Signed by KJ LARA on 01/15/19 at 1554
 
 
 
 
 
 
**All edits/amendments must be made on the electronic document**
 
DICTATION DATE: 01/15/19 1553     : CONNIE  01/15/19 1553     
RPT#: [id number]                                DC DATE:        
                                               STATUS: ADM IN  
[facility]
[address] [place] [address]
***END OF REPORT***

## 2019-01-15 NOTE — MORECARE
CASE MANAGEMENT DISCHARGE SUMMARY
 
 
PATIENT: JOSEFA LEWIS                 UNIT: [id number]
ACCOUNT#: [id number]                       ADM DATE: 01/10/19
AGE: 73     : 43  SEX: F            ROOM/BED: D.2215    
AUTHOR: JANE,DOC                             PHYSICIAN:                               
 
REFERRING PHYSICIAN: MARLON BOO MD                
DATE OF SERVICE: 01/15/19
Discharge Plan
 
 
Patient Name: JOSEFA LEWIS
Facility: [facility]:[place]
Encounter #: [id number]
Medical Record #: [id number]
: 1945
Planned Disposition: 
Anticipated Discharge Date: 19
 
Discharge Date: 
Expected LOS: 4
Initial Reviewer: [email or web address]
Initial Review Date: 01/10/2019
Generated: 1/15/19   4:37 pm 
Comments
 
DCP- Discharge Planning
 
Updated by [id number]: Ro Zarate on 1/15/19   2:36 pm CT
MET WITH PATIENT SHE WOULD LIKE TO GO TO [facility] TO THEIR INPATIENT 
FACILITY
DCP- Discharge Planning
 
Updated by [id number]: Marcella Canales on 1/10/19   3:30 pm CT
Patient Name: JOSEFA LEWIS                                    
Admission Status: ER  
Accout number: [id number]                             
Admission Date: 01-  
: 1945                                                       
Admission Diagnosis:  
Attending: MARLON BOO                                               
Current LOS:  1  
 
Anticipated DC Date: 2019  
Planned Disposition:   
Primary Insurance: MEDICARE A & B  
 
 
 
Discharge Planning Comments:  
 
CM met with patient to complete initial dc planning assessment.  CM educated 
patient on the CM role and verbal consent given by patient to complete 
assessment.   Patient lives at home with her spouse but was currently at [facility] for therapy. Patient reports she has been in and 
out of the hopstial/rehab since April with infected knee.  At discharge 
patient plans to return to [facility] to complete her rehab and feels this 
is a safe discharge.  Patient reports she is not able to ambulate due to her 
knee and is wheelchair bound. Patient denied known discharge needs at this 
time. CM will continue to follow and will assist as needed with dc 
plans/needs.   
 
: Marcella Canales RN, [facility]
 DCPIA - Discharge Planning Initial Assessment
 
Updated by [email or web address]: Marcella Canales on 1/10/19   4:27 pm
*  Is the patient Alert and Oriented?
Yes
*  How many steps to enter\exit or inside your home? none *  PCP Dr. Primo Santoro * 
Pharmacy
[facility]
*  Preadmission Environment
Skilled Nursing Facility
*  Facility Name
[facility]
*  ADLs
Partial Dependent
*  Partial ADLs (Assistance needed)
Ambulation
Bathing
Dressing
Medication Management
Transfers
*  Equipment
Wheelchair
*  List name and contact numbers for known caregivers / representatives who 
currently or will assist patient after discharge:
Kashif Lewis - spouse - [phone]
*  Verbal permission to speak to the caregivers and representatives has been 
obtained from the patient.
Yes
*  Community resources currently utilized
None
*  Additional services required to return to the preadmission environment?
No
*  Can the patient safely return to the preadmission environment?
Yes
*  Has this patient been hospitalized within the prior 30 days at any 
hospital?
Yes
 
 
 
 
 
 
 
 
Last DP export: 1/10/19   3:36 p
Patient Name: JOSEFA LEWIS
Encounter #: [id number]
Page [phone]
 
 
 
 
 
Electronically Signed by KJ LARA on 01/15/19 at 1537
 
 
 
 
 
 
**All edits/amendments must be made on the electronic document**
 
DICTATION DATE: 01/15/19 1537     : CONNIE  01/15/19 1537     
RPT#: [id number]                                DC DATE:        
                                               STATUS: ADM IN  
[facility]
[address] [place] [address]
***END OF REPORT***

## 2019-01-16 VITALS — SYSTOLIC BLOOD PRESSURE: 88 MMHG | DIASTOLIC BLOOD PRESSURE: 42 MMHG

## 2019-01-16 VITALS — SYSTOLIC BLOOD PRESSURE: 146 MMHG | DIASTOLIC BLOOD PRESSURE: 59 MMHG

## 2019-01-16 VITALS — DIASTOLIC BLOOD PRESSURE: 46 MMHG | SYSTOLIC BLOOD PRESSURE: 112 MMHG

## 2019-01-16 VITALS — SYSTOLIC BLOOD PRESSURE: 127 MMHG | DIASTOLIC BLOOD PRESSURE: 60 MMHG

## 2019-01-16 VITALS — SYSTOLIC BLOOD PRESSURE: 100 MMHG | DIASTOLIC BLOOD PRESSURE: 45 MMHG

## 2019-01-16 LAB
ALBUMIN SERPL-MCNC: 1.5 G/DL (ref 3.4–5)
ALP SERPL-CCNC: 61 U/L (ref 46–116)
ALT SERPL-CCNC: 17 U/L (ref 10–68)
ANION GAP SERPL CALC-SCNC: 11.4 MMOL/L (ref 8–16)
BASOPHILS NFR BLD AUTO: 0.5 % (ref 0–2)
BILIRUB SERPL-MCNC: 0.35 MG/DL (ref 0.2–1.3)
BUN SERPL-MCNC: 27 MG/DL (ref 7–18)
CALCIUM SERPL-MCNC: 8.1 MG/DL (ref 8.5–10.1)
CHLORIDE SERPL-SCNC: 108 MMOL/L (ref 98–107)
CO2 SERPL-SCNC: 24.7 MMOL/L (ref 21–32)
CREAT SERPL-MCNC: 2.5 MG/DL (ref 0.6–1.3)
EOSINOPHIL NFR BLD: 3.2 % (ref 0–7)
ERYTHROCYTE [DISTWIDTH] IN BLOOD BY AUTOMATED COUNT: 16 % (ref 11.5–14.5)
GLOBULIN SER-MCNC: 3.3 G/L
GLUCOSE SERPL-MCNC: 98 MG/DL (ref 74–106)
HCT VFR BLD CALC: 23.5 % (ref 36–48)
HGB BLD-MCNC: 7.9 G/DL (ref 12–16)
IMM GRANULOCYTES NFR BLD: 3.8 % (ref 0–5)
LYMPHOCYTES NFR BLD AUTO: 17.1 % (ref 15–50)
MCH RBC QN AUTO: 28.5 PG (ref 26–34)
MCHC RBC AUTO-ENTMCNC: 33.6 G/DL (ref 31–37)
MCV RBC: 84.8 FL (ref 80–100)
MONOCYTES NFR BLD: 13.3 % (ref 2–11)
NEUTROPHILS NFR BLD AUTO: 62.1 % (ref 40–80)
OSMOLALITY SERPL CALC.SUM OF ELEC: 283 MOSM/KG (ref 275–300)
PLATELET # BLD: 185 10X3/UL (ref 130–400)
PMV BLD AUTO: 9.9 FL (ref 7.4–10.4)
POTASSIUM SERPL-SCNC: 4.1 MMOL/L (ref 3.5–5.1)
PROT SERPL-MCNC: 4.8 G/DL (ref 6.4–8.2)
RBC # BLD AUTO: 2.77 10X6/UL (ref 4–5.4)
SODIUM SERPL-SCNC: 140 MMOL/L (ref 136–145)
WBC # BLD AUTO: 10.1 10X3/UL (ref 4.8–10.8)

## 2019-01-17 VITALS — SYSTOLIC BLOOD PRESSURE: 148 MMHG | DIASTOLIC BLOOD PRESSURE: 69 MMHG

## 2019-01-17 VITALS — DIASTOLIC BLOOD PRESSURE: 52 MMHG | SYSTOLIC BLOOD PRESSURE: 126 MMHG

## 2019-01-17 VITALS — SYSTOLIC BLOOD PRESSURE: 111 MMHG | DIASTOLIC BLOOD PRESSURE: 52 MMHG

## 2019-01-17 VITALS — SYSTOLIC BLOOD PRESSURE: 115 MMHG | DIASTOLIC BLOOD PRESSURE: 42 MMHG

## 2019-01-17 VITALS — DIASTOLIC BLOOD PRESSURE: 76 MMHG | SYSTOLIC BLOOD PRESSURE: 149 MMHG

## 2019-01-17 LAB
ANION GAP SERPL CALC-SCNC: 11.5 MMOL/L (ref 8–16)
BASOPHILS NFR BLD AUTO: 0.6 % (ref 0–2)
BUN SERPL-MCNC: 27 MG/DL (ref 7–18)
CALCIUM SERPL-MCNC: 8.3 MG/DL (ref 8.5–10.1)
CHLORIDE SERPL-SCNC: 108 MMOL/L (ref 98–107)
CO2 SERPL-SCNC: 24.2 MMOL/L (ref 21–32)
CREAT SERPL-MCNC: 2.1 MG/DL (ref 0.6–1.3)
EOSINOPHIL NFR BLD: 2.3 % (ref 0–7)
ERYTHROCYTE [DISTWIDTH] IN BLOOD BY AUTOMATED COUNT: 16.2 % (ref 11.5–14.5)
GLUCOSE SERPL-MCNC: 87 MG/DL (ref 74–106)
HCT VFR BLD CALC: 25.7 % (ref 36–48)
HGB BLD-MCNC: 8.5 G/DL (ref 12–16)
IMM GRANULOCYTES NFR BLD: 4.6 % (ref 0–5)
LYMPHOCYTES NFR BLD AUTO: 16.8 % (ref 15–50)
MCH RBC QN AUTO: 28.4 PG (ref 26–34)
MCHC RBC AUTO-ENTMCNC: 33.1 G/DL (ref 31–37)
MCV RBC: 86 FL (ref 80–100)
MONOCYTES NFR BLD: 13.5 % (ref 2–11)
NEUTROPHILS NFR BLD AUTO: 62.2 % (ref 40–80)
OSMOLALITY SERPL CALC.SUM OF ELEC: 282 MOSM/KG (ref 275–300)
PLATELET # BLD: 177 10X3/UL (ref 130–400)
PMV BLD AUTO: 9.7 FL (ref 7.4–10.4)
POTASSIUM SERPL-SCNC: 3.7 MMOL/L (ref 3.5–5.1)
RBC # BLD AUTO: 2.99 10X6/UL (ref 4–5.4)
SODIUM SERPL-SCNC: 140 MMOL/L (ref 136–145)
WBC # BLD AUTO: 14.1 10X3/UL (ref 4.8–10.8)

## 2019-01-17 NOTE — MORECARE
CASE MANAGEMENT DISCHARGE SUMMARY
 
 
PATIENT: JOSEFA LEWIS                 UNIT: [id number]
ACCOUNT#: [id number]                       ADM DATE: 01/10/19
AGE: 73     : 43  SEX: F            ROOM/BED: D.2215    
AUTHOR: KJ LARA                             PHYSICIAN:                               
 
REFERRING PHYSICIAN: MARLON BOO MD                
DATE OF SERVICE: 19
Discharge Plan
 
 
Patient Name: JOSEFA LEWIS
Facility: [facility]:[place]
Encounter #: [id number]
Medical Record #: [id number]
: 1945
Planned Disposition: 
Anticipated Discharge Date: 19
 
Discharge Date: 
Expected LOS: 4
Initial Reviewer: [id number]
Initial Review Date: 01/10/2019
Generated: 19  11:33 am 
Comments
 
DCP- Discharge Planning
 
Updated by [id number]: Ro Zarate on 19   9:32 am CT
UPDATED CLINICAL SENT TO [facility], I SPOKE WITH YAMILE.  SHE DID 
SAY THAT THEY WOULD ACCEPT HER WHEN SHE WAS STABLE TO COME. THEY ONLY ACCEPT 
ON  IF SHE DISCHARGES OVER THE WEEKEND.  I LET ROYCE SANCHEZ AWARE OF 
THIS. CM WILL CONTINUE TO FOLLOW AND ASSIST WITH DC PLANNING
DCP- Discharge Planning
 
Updated by [id number]: Ro Zarate on 1/15/19   2:48 pm CT
REFERRAL SENT TO [facility], I SPOKE WITH YAMILE  
FAX # [phone]  
[facility] # [phone]
DCP- Discharge Planning
 
Updated by [id number]: Ro Zarate on 1/15/19   2:36 pm CT
MET WITH PATIENT SHE WOULD LIKE TO GO TO [facility] TO THEIR INPATIENT 
FACILITY
DCP- Discharge Planning
 
Updated by [id number]: Marcella Canales on 1/10/19   3:30 pm CT
Patient Name: JOSEFA LEWIS                                    
 
Admission Status: ER  
Accout number: [id number]                             
Admission Date: 01-  
: 1945                                                       
Admission Diagnosis:  
Attending: MARLON BOO                                               
Current LOS:  1  
 
Anticipated DC Date: 2019  
Planned Disposition:   
Primary Insurance: MEDICARE A & B  
 
 
Discharge Planning Comments:  
 
CM met with patient to complete initial dc planning assessment.  CM educated 
patient on the CM role and verbal consent given by patient to complete 
assessment.   Patient lives at home with her spouse but was currently at [facility] for therapy. Patient reports she has been in and 
out of the hopstial/rehab since April with infected knee.  At discharge 
patient plans to return to [facility] to complete her rehab and feels this 
is a safe discharge.  Patient reports she is not able to ambulate due to her 
knee and is wheelchair bound. Patient denied known discharge needs at this 
time. CM will continue to follow and will assist as needed with dc 
plans/needs.   
 
: Marcella Canales RN, [facility]
 DCPIA - Discharge Planning Initial Assessment
 
Updated by [id number]: Marcella Canales on 1/10/19   4:27 pm
*  Is the patient Alert and Oriented?
Yes
*  How many steps to enter\exit or inside your home? none *  PCP Dr. Primo Santoro * 
Pharmacy
[facility]
*  Preadmission Environment
Skilled Nursing Facility
*  Facility Name
[facility]
*  ADLs
Partial Dependent
*  Partial ADLs (Assistance needed)
Ambulation
Bathing
Dressing
Medication Management
Transfers
*  Equipment
Wheelchair
*  List name and contact numbers for known caregivers / representatives who 
currently or will assist patient after discharge:
Kashif Lewis - spouse - [phone]
 
*  Verbal permission to speak to the caregivers and representatives has been 
obtained from the patient.
Yes
*  Community resources currently utilized
None
*  Additional services required to return to the preadmission environment?
No
*  Can the patient safely return to the preadmission environment?
Yes
*  Has this patient been hospitalized within the prior 30 days at any 
hospital?
Yes
 
 
 
 
 
 
 
Last DP export: 1/15/19   2:54 p
Patient Name: JOSEFA LEWIS
Encounter #: [id number]
Page [phone]
 
 
 
 
 
Electronically Signed by KJ LARA on 19 at 1034
 
 
 
 
 
 
**All edits/amendments must be made on the electronic document**
 
DICTATION DATE: 19 1033     : CONNIE  19 1033     
RPT#: [id number]                                DC DATE:        
                                               STATUS: ADM IN  
[facility]
[address] [place] [address]
***END OF REPORT***

## 2019-01-18 VITALS — DIASTOLIC BLOOD PRESSURE: 54 MMHG | SYSTOLIC BLOOD PRESSURE: 120 MMHG

## 2019-01-18 VITALS — SYSTOLIC BLOOD PRESSURE: 115 MMHG | DIASTOLIC BLOOD PRESSURE: 80 MMHG

## 2019-01-18 VITALS — SYSTOLIC BLOOD PRESSURE: 129 MMHG | DIASTOLIC BLOOD PRESSURE: 69 MMHG

## 2019-01-18 VITALS — SYSTOLIC BLOOD PRESSURE: 120 MMHG | DIASTOLIC BLOOD PRESSURE: 52 MMHG

## 2019-01-18 LAB
ANION GAP SERPL CALC-SCNC: 13.2 MMOL/L (ref 8–16)
BUN SERPL-MCNC: 23 MG/DL (ref 7–18)
CALCIUM SERPL-MCNC: 8.4 MG/DL (ref 8.5–10.1)
CHLORIDE SERPL-SCNC: 108 MMOL/L (ref 98–107)
CO2 SERPL-SCNC: 22.6 MMOL/L (ref 21–32)
CREAT SERPL-MCNC: 1.8 MG/DL (ref 0.6–1.3)
EOSINOPHIL NFR BLD: 6 % (ref 0–7)
ERYTHROCYTE [DISTWIDTH] IN BLOOD BY AUTOMATED COUNT: 16 % (ref 11.5–14.5)
GLUCOSE SERPL-MCNC: 87 MG/DL (ref 74–106)
HCT VFR BLD CALC: 27.4 % (ref 36–48)
HGB BLD-MCNC: 9.2 G/DL (ref 12–16)
LYMPHOCYTES NFR BLD AUTO: 13 % (ref 15–50)
MCH RBC QN AUTO: 29 PG (ref 26–34)
MCHC RBC AUTO-ENTMCNC: 33.6 G/DL (ref 31–37)
MCV RBC: 86.4 FL (ref 80–100)
MONOCYTES NFR BLD: 12 % (ref 2–11)
NEUTROPHILS NFR BLD AUTO: 59 % (ref 40–80)
OSMOLALITY SERPL CALC.SUM OF ELEC: 281 MOSM/KG (ref 275–300)
PLATELET # BLD EST: NORMAL 10*3/UL
PLATELET # BLD: 239 10X3/UL (ref 130–400)
PMV BLD AUTO: 9.8 FL (ref 7.4–10.4)
POTASSIUM SERPL-SCNC: 3.8 MMOL/L (ref 3.5–5.1)
RBC # BLD AUTO: 3.17 10X6/UL (ref 4–5.4)
SODIUM SERPL-SCNC: 140 MMOL/L (ref 136–145)
WBC # BLD AUTO: 12.6 10X3/UL (ref 4.8–10.8)

## 2019-01-18 NOTE — OP
PATIENT NAME:  JOSEFA MONTAÑO                       MEDICAL RECORD: [id number]
:[birth year]                                             LOCATION:D.MS [id number]
                                                         ADMISSION DATE:01/10/19
SURGEON:  ELVER WILSON MD        
 
 
DATE OF OPERATION:  2019
 
PREOPERATIVE DIAGNOSIS:  Chronic infected left total knee arthroplasty.
 
POSTOPERATIVE DIAGNOSIS:  Chronic infected left total knee arthroplasty.
 
PROCEDURE:
1.  Left above-knee amputation.
2.  Removal of previously placed hardware.
 
SURGEON:  Elver Wilson MD
 
FIRST ASSISTANT: 
 
ANESTHESIA:  ANCELMO Betts FA
 
INTRAOPERATIVE COMPLICATIONS:  None.
 
SUMMARY OF PATHOLOGIC FINDINGS:  The patient previously had a GMR for revision
of total knee arthroplasty.  The patient initially presented almost year ago
with a periprosthetic fracture of her tibial baseplate.  This resulted in
revision of total knee arthroplasty.  Unfortunately, the patient has a BMI in
excess of 60%, which put her at substantial risk.  She did get infected.  My
attempts at eradicating the infection initially failed.  She came back within
normal inflammatory markers and the GMR put in.  At the postoperative period,
she developed a second infection of the left total knee.  After the patient was
sent to [place] consultation with Dr. Shayan Anderson, he recommended an above-knee
amputation as she had nothing further to offer her including free flap for
revision.  She was thusly scheduled for above-knee amputation after her health
condition had improved as she was admitted to the hospital with what appeared to
be early sepsis.  Today, at the time of surgery, her vital signs are normal and
she is afebrile.
 
OPERATIVE SUMMARY IN DETAIL:  After obtaining the appropriate preoperative
orthopedic surgery consent as well as anesthetic consultation, evaluation, and
clearance, the patient was brought to the operating room and placed on the
operating table in supine position.  After general laryngeal mask airway was
administered, the patient's very extremely large leg was prepped and draped in a
routine sterile fashion.  The leg was elevated and exsanguinated, tourniquet
inflated to 350 mmHg.  Unfortunately, this only resulted in a venous tourniquet
even after trying to apply more pressure, it was thusly deflated to help
decrease blood loss.  [place] incision was drawn and the incision was
initiated across the top of the femur.  This was taken down to the level of the
trunnion of the GMR, this was eventually disarticulated.  It was at this point
that it was noted that the tourniquet was essentially causing more blood loss
than it was helping, it was deflated and this decreased the amount of blood
loss.  The femoral artery was identified, clamped and the posterior flap was
then completed.  The leg was then taken off the field.  The area was completely
irrigated.  At this point, the extraction device for the stem of the GMR was
locked into the distal point and mallet was used then to extract it.  The distal
end of the femur was then cut using a system 6 Sukh saw.  Further irrigation
was then followed out.  All clamped arteries and veins were then tied with 0
 
 
 
OPERATIVE REPORT                               [id number]    JOSEFA MONTAÑO with the assistance of Davide Manrique.  Further irrigation was carried out.
 Having completed this, given the obesity and the overall size and girth of this
patient, large Prolene sutures were then put into place in a retention method. 
Ultimately, this resulted in good closure and no leaking or blood loss was
noted.  Having completed this, sterile dressings were applied.  The patient was
then awakened and taken to recovery room in stable condition.  All final needle
and sponge counts were correct.
 
TRANSINT:[id number] Voice Confirmation ID: [id number] DOCUMENT ID: [id number]
                                           
                                           KATIE DELONG, ELVER MANZANO        
 
 
 
Electronically Signed by ELVER WILSON MD on 19 at 1428
 
 
 
 
 
 
 
 
 
 
 
 
 
 
 
 
 
 
 
 
 
 
 
 
 
 
 
 
 
 
 
 
CC:                                                             [id number]
DICTATION DATE: 19 1127     :     19 1255      ADM IN  
                                                                              
[facility]                                          
[address] [id number]

## 2019-01-19 VITALS — SYSTOLIC BLOOD PRESSURE: 140 MMHG | DIASTOLIC BLOOD PRESSURE: 62 MMHG

## 2019-01-19 VITALS — DIASTOLIC BLOOD PRESSURE: 66 MMHG | SYSTOLIC BLOOD PRESSURE: 134 MMHG

## 2019-01-19 VITALS — DIASTOLIC BLOOD PRESSURE: 62 MMHG | SYSTOLIC BLOOD PRESSURE: 140 MMHG

## 2019-01-19 VITALS — DIASTOLIC BLOOD PRESSURE: 54 MMHG | SYSTOLIC BLOOD PRESSURE: 111 MMHG

## 2019-01-19 VITALS — DIASTOLIC BLOOD PRESSURE: 63 MMHG | SYSTOLIC BLOOD PRESSURE: 129 MMHG

## 2019-01-19 VITALS — SYSTOLIC BLOOD PRESSURE: 134 MMHG | DIASTOLIC BLOOD PRESSURE: 61 MMHG

## 2019-01-19 LAB
ANION GAP SERPL CALC-SCNC: 12.3 MMOL/L (ref 8–16)
BASOPHILS NFR BLD AUTO: 0.5 % (ref 0–2)
BUN SERPL-MCNC: 21 MG/DL (ref 7–18)
CALCIUM SERPL-MCNC: 8 MG/DL (ref 8.5–10.1)
CHLORIDE SERPL-SCNC: 109 MMOL/L (ref 98–107)
CO2 SERPL-SCNC: 22.5 MMOL/L (ref 21–32)
CREAT SERPL-MCNC: 1.7 MG/DL (ref 0.6–1.3)
EOSINOPHIL NFR BLD: 6.4 % (ref 0–7)
ERYTHROCYTE [DISTWIDTH] IN BLOOD BY AUTOMATED COUNT: 16 % (ref 11.5–14.5)
GLUCOSE SERPL-MCNC: 80 MG/DL (ref 74–106)
HCT VFR BLD CALC: 25.1 % (ref 36–48)
HGB BLD-MCNC: 8.3 G/DL (ref 12–16)
IMM GRANULOCYTES NFR BLD: 4.7 % (ref 0–5)
LYMPHOCYTES NFR BLD AUTO: 14 % (ref 15–50)
MCH RBC QN AUTO: 28.7 PG (ref 26–34)
MCHC RBC AUTO-ENTMCNC: 33.1 G/DL (ref 31–37)
MCV RBC: 86.9 FL (ref 80–100)
MONOCYTES NFR BLD: 15.3 % (ref 2–11)
NEUTROPHILS NFR BLD AUTO: 59.1 % (ref 40–80)
OSMOLALITY SERPL CALC.SUM OF ELEC: 280 MOSM/KG (ref 275–300)
PLATELET # BLD: 235 10X3/UL (ref 130–400)
PMV BLD AUTO: 9.4 FL (ref 7.4–10.4)
POTASSIUM SERPL-SCNC: 3.8 MMOL/L (ref 3.5–5.1)
RBC # BLD AUTO: 2.89 10X6/UL (ref 4–5.4)
SODIUM SERPL-SCNC: 140 MMOL/L (ref 136–145)
WBC # BLD AUTO: 7.9 10X3/UL (ref 4.8–10.8)

## 2019-01-20 VITALS — DIASTOLIC BLOOD PRESSURE: 55 MMHG | SYSTOLIC BLOOD PRESSURE: 128 MMHG

## 2019-01-20 VITALS — DIASTOLIC BLOOD PRESSURE: 55 MMHG | SYSTOLIC BLOOD PRESSURE: 133 MMHG

## 2019-01-20 VITALS — SYSTOLIC BLOOD PRESSURE: 132 MMHG | DIASTOLIC BLOOD PRESSURE: 73 MMHG

## 2019-01-20 VITALS — DIASTOLIC BLOOD PRESSURE: 61 MMHG | SYSTOLIC BLOOD PRESSURE: 145 MMHG

## 2019-01-20 VITALS — SYSTOLIC BLOOD PRESSURE: 149 MMHG | DIASTOLIC BLOOD PRESSURE: 59 MMHG

## 2019-01-20 LAB
ANION GAP SERPL CALC-SCNC: 12.1 MMOL/L (ref 8–16)
BUN SERPL-MCNC: 20 MG/DL (ref 7–18)
CALCIUM SERPL-MCNC: 8.1 MG/DL (ref 8.5–10.1)
CHLORIDE SERPL-SCNC: 108 MMOL/L (ref 98–107)
CO2 SERPL-SCNC: 22.7 MMOL/L (ref 21–32)
CREAT SERPL-MCNC: 1.7 MG/DL (ref 0.6–1.3)
EOSINOPHIL NFR BLD: 2 % (ref 0–7)
ERYTHROCYTE [DISTWIDTH] IN BLOOD BY AUTOMATED COUNT: 16.2 % (ref 11.5–14.5)
GLUCOSE SERPL-MCNC: 83 MG/DL (ref 74–106)
HCT VFR BLD CALC: 25.5 % (ref 36–48)
HGB BLD-MCNC: 8.3 G/DL (ref 12–16)
LYMPHOCYTES NFR BLD AUTO: 25 % (ref 15–50)
MCH RBC QN AUTO: 28.6 PG (ref 26–34)
MCHC RBC AUTO-ENTMCNC: 32.5 G/DL (ref 31–37)
MCV RBC: 87.9 FL (ref 80–100)
MONOCYTES NFR BLD: 10 % (ref 2–11)
NEUTROPHILS NFR BLD AUTO: 55 % (ref 40–80)
OSMOLALITY SERPL CALC.SUM OF ELEC: 279 MOSM/KG (ref 275–300)
PLATELET # BLD EST: NORMAL 10*3/UL
PLATELET # BLD: 240 10X3/UL (ref 130–400)
PMV BLD AUTO: 9.5 FL (ref 7.4–10.4)
POTASSIUM SERPL-SCNC: 3.8 MMOL/L (ref 3.5–5.1)
RBC # BLD AUTO: 2.9 10X6/UL (ref 4–5.4)
SODIUM SERPL-SCNC: 139 MMOL/L (ref 136–145)
WBC # BLD AUTO: 6.6 10X3/UL (ref 4.8–10.8)

## 2019-01-20 NOTE — CN
PATIENT NAME:JOSEFA MONTAÑO                           MEDICAL RECORD: [id number]
: 43                                              LOCATION:D.MS [facility]5
ADMIT DATE: 01/10/19                                       ACCOUNT: [id number]
CONSULTING PHYSICIAN:    RAMY CROWELL MD             
                                               
REFERRING PHYSICIAN:     MARLON BOO MD                
 
 
DATE OF CONSULTATION:  2019
 
PSYCHIATRIC CONSULTATION
 
IDENTIFYING DATA:  The patient is 73 years old.  She is admitted to the hospital
secondary to osteomyelitis and a failed knee replacement.
 
CHIEF COMPLAINT:  None.
 
HISTORY OF PRESENT ILLNESS:  The patient has been struggling with a failed knee
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  Currently, she is slated to undergo an
above-knee amputation on Monday.  She has been depressed about this, but has no
suicidal thoughts.  She currently takes Cymbalta.
 
ASSESSMENT:  Major depression versus adjustment disorder with depressed mood.
 
PLAN:  The patient is not actively suicidal or dangerous in an acute way. 
Clearly, her depression is situationally related to what is going on with this
knee and the fact that she is going to have an amputation.  She is currently
taking Cymbalta, which is reasonable and appropriate.  Based on how she does
after the knee replacement, with regard to her mood state, she may or may not
need additional antidepressants and/or followup psychiatric care.  There is no
evidence of acute or direct dangerousness in the notes and clearly this is
largely a situationally driven mood state associated with a very unfortunate
postoperative outcome.
 
TRANSINT:[id number] Voice Confirmation ID: [id number] DOCUMENT ID: [id number]
                                           
                                           RAMY CROWELL MD             
 
 
 
Electronically Signed by RAMY CROWELL on 19 at 1357
 
 
 
 
 
 
 
 
 
CC:                                                             [id number]
DICTATION DATE: 19 1100     :     19 1208      ADM IN  
                                                                              
[facility]                                          
[address] [id number]

## 2019-01-20 NOTE — MORECARE
CASE MANAGEMENT DISCHARGE SUMMARY
 
 
PATIENT: JOSEFA LEWIS                 UNIT: [id number]
ACCOUNT#: [id number]                       ADM DATE: 01/10/19
AGE: 73     : [birth year]  SEX: F            ROOM/BED: D.2215    
AUTHOR: JANE,DOC                             PHYSICIAN:                               
 
REFERRING PHYSICIAN: MARLON BOO MD                
DATE OF SERVICE: 19
Discharge Plan
 
 
Patient Name: JOSEFA LEWIS
Facility: [facility]:[place]
Encounter #: [id number]
Medical Record #: [id number]
: 1945
Planned Disposition: 
Anticipated Discharge Date: 19
 
Discharge Date: 
Expected LOS: 4
Initial Reviewer: [id number]
Initial Review Date: 01/10/2019
Generated: 19  12:03 pm 
Comments
 
DCP- Discharge Planning
 
Updated by [id number]: Eunice An on 19   9:58 am CT
APN PRESENTED TO CM WITH QUESTIONS  REGARDING DISCHARGE TO ACUTE REHAB TODAY. 
PATIENT WITH QUESTIONS REGARDING DISCHARGE. PATIENT IS TEARFUL. STATES SHE 
WAS TOLD SHE WOULD BE LEAVING MONDAY OR TUESDAY. HAD QUESTIONS REGARDING HER 
REMAINING INSURANCE DAYS.  
CM CANNOT OBTAIN INFORMATION REGARDING INSURANCE DAYS OVER THE WEEKEND.   
TC TO [facility] ACUTE REHAB.  
SPOKE WITH PHIL BECK. SHE STATES [facility] HAS THE PATIENT SCHEDULED FOR 
MONDAY.  
CM ADVISED THE PATIENT. SPOKE WITH THE PRIMARY NURSE AND APN.  
WILL ASK WEEKDAY CM TO CALL BILLING ON MONDAY TO ADDRESS QUESTIONS REGARDING 
REMAINING HOSPITAL DAYS .
DCP- Discharge Planning
 
Updated by [id number]: Ro Zarate on 19   9:32 am CT
UPDATED CLINICAL SENT TO [facility], I SPOKE WITH YAMILE.  SHE DID 
SAY THAT THEY WOULD ACCEPT HER WHEN SHE WAS STABLE TO COME. THEY ONLY ACCEPT 
ON  IF SHE DISCHARGES OVER THE WEEKEND.  I LET ROYCE SANCHEZ AWARE OF 
THIS. CM WILL CONTINUE TO FOLLOW AND ASSIST WITH DC PLANNING
DCP- Discharge Planning
 
 
Updated by [id number]: Ro Zarate on 1/15/19   2:48 pm CT
REFERRAL SENT TO [facility], I SPOKE WITH YAMILE  
FAX # [phone]  
[facility] # [phone]
DCP- Discharge Planning
 
Updated by [id number]: Ro Zarate on 1/15/19   2:36 pm CT
MET WITH PATIENT SHE WOULD LIKE TO GO TO [place] TO THEIR INPATIENT 
FACILITY
DCP- Discharge Planning
 
Updated by [id number]: Marcella Canales on 1/10/19   3:30 pm CT
Patient Name: JOSEFA LEWIS                                    
Admission Status: ER  
Accout number: [id number]                             
Admission Date: 01-  
: 1945                                                       
Admission Diagnosis:  
Attending: MARLON BOO                                               
Current LOS:  1  
 
Anticipated DC Date: 2019  
Planned Disposition:   
Primary Insurance: MEDICARE A & B  
 
 
Discharge Planning Comments:  
 
CM met with patient to complete initial dc planning assessment.  CM educated 
patient on the CM role and verbal consent given by patient to complete 
assessment.   Patient lives at home with her spouse but was currently at [facility] for therapy. Patient reports she has been in and 
out of the hopstial/rehab since April with infected knee.  At discharge 
patient plans to return to [facility] to complete her rehab and feels this 
is a safe discharge.  Patient reports she is not able to ambulate due to her 
knee and is wheelchair bound. Patient denied known discharge needs at this 
time. CM will continue to follow and will assist as needed with dc 
plans/needs.   
 
: Marcella Canales RN, [facility]
 DCPIA - Discharge Planning Initial Assessment
 
Updated by [id number]: Marcella Canales on 1/10/19   4:27 pm
*  Is the patient Alert and Oriented?
Yes
*  How many steps to enter\exit or inside your home? none *  PCP Dr. Primo Santoro * 
Pharmacy
[facility]
*  Preadmission Environment
Skilled Nursing Facility
*  Facility Name
 
[facility]
*  ADLs
Partial Dependent
*  Partial ADLs (Assistance needed)
Ambulation
Bathing
Dressing
Medication Management
Transfers
*  Equipment
Wheelchair
*  List name and contact numbers for known caregivers / representatives who 
currently or will assist patient after discharge:
Kashif Lewis - spouse - [phone]
*  Verbal permission to speak to the caregivers and representatives has been 
obtained from the patient.
Yes
*  Community resources currently utilized
None
*  Additional services required to return to the preadmission environment?
No
*  Can the patient safely return to the preadmission environment?
Yes
*  Has this patient been hospitalized within the prior 30 days at any 
hospital?
Yes
 
 
 
 
 
 
 
Last DP export: 19   9:34 a
Patient Name: JOSEFA LEWIS
 
Encounter #: [id number]
Page [id number]
 
 
 
 
 
Electronically Signed by KJ LARA on 19 at 1103
 
 
 
 
 
 
**All edits/amendments must be made on the electronic document**
 
DICTATION DATE: 19     : CONNIE  19     
RPT#: [id number]                                DC DATE:        
                                               STATUS: ADM IN  
[facility]
 [place] [address]
***END OF REPORT***

## 2019-01-21 VITALS — DIASTOLIC BLOOD PRESSURE: 54 MMHG | SYSTOLIC BLOOD PRESSURE: 132 MMHG

## 2019-01-21 VITALS — DIASTOLIC BLOOD PRESSURE: 70 MMHG | SYSTOLIC BLOOD PRESSURE: 166 MMHG

## 2019-01-21 VITALS — SYSTOLIC BLOOD PRESSURE: 172 MMHG | DIASTOLIC BLOOD PRESSURE: 80 MMHG

## 2019-01-21 VITALS — SYSTOLIC BLOOD PRESSURE: 133 MMHG | DIASTOLIC BLOOD PRESSURE: 60 MMHG

## 2019-01-21 VITALS — DIASTOLIC BLOOD PRESSURE: 40 MMHG | SYSTOLIC BLOOD PRESSURE: 108 MMHG

## 2019-01-21 VITALS — SYSTOLIC BLOOD PRESSURE: 148 MMHG | DIASTOLIC BLOOD PRESSURE: 56 MMHG

## 2019-01-21 LAB
ANION GAP SERPL CALC-SCNC: 10.7 MMOL/L (ref 8–16)
BASOPHILS NFR BLD AUTO: 1.1 % (ref 0–2)
BUN SERPL-MCNC: 17 MG/DL (ref 7–18)
CALCIUM SERPL-MCNC: 8.1 MG/DL (ref 8.5–10.1)
CHLORIDE SERPL-SCNC: 109 MMOL/L (ref 98–107)
CO2 SERPL-SCNC: 22.8 MMOL/L (ref 21–32)
CREAT SERPL-MCNC: 1.5 MG/DL (ref 0.6–1.3)
EOSINOPHIL NFR BLD: 5.5 % (ref 0–7)
ERYTHROCYTE [DISTWIDTH] IN BLOOD BY AUTOMATED COUNT: 16.3 % (ref 11.5–14.5)
GLUCOSE SERPL-MCNC: 97 MG/DL (ref 74–106)
HCT VFR BLD CALC: 25.3 % (ref 36–48)
HGB BLD-MCNC: 8.4 G/DL (ref 12–16)
IMM GRANULOCYTES NFR BLD: 6.8 % (ref 0–5)
LYMPHOCYTES NFR BLD AUTO: 20.7 % (ref 15–50)
MCH RBC QN AUTO: 29.1 PG (ref 26–34)
MCHC RBC AUTO-ENTMCNC: 33.2 G/DL (ref 31–37)
MCV RBC: 87.5 FL (ref 80–100)
MONOCYTES NFR BLD: 21.4 % (ref 2–11)
NEUTROPHILS NFR BLD AUTO: 44.5 % (ref 40–80)
OSMOLALITY SERPL CALC.SUM OF ELEC: 279 MOSM/KG (ref 275–300)
PLATELET # BLD: 228 10X3/UL (ref 130–400)
PMV BLD AUTO: 9.8 FL (ref 7.4–10.4)
POTASSIUM SERPL-SCNC: 3.5 MMOL/L (ref 3.5–5.1)
RBC # BLD AUTO: 2.89 10X6/UL (ref 4–5.4)
SODIUM SERPL-SCNC: 139 MMOL/L (ref 136–145)
WBC # BLD AUTO: 4.7 10X3/UL (ref 4.8–10.8)

## 2019-01-21 NOTE — MORECARE
CASE MANAGEMENT DISCHARGE SUMMARY
 
 
PATIENT: JOSEFA LEWIS                 UNIT: [id number]
ACCOUNT#: [id number]                       ADM DATE: 01/10/19
AGE: 73     : 43  SEX: F            ROOM/BED: D.8155    
AUTHOR: JANE,DOC                             PHYSICIAN:                               
 
REFERRING PHYSICIAN: MARLON BOO MD                
DATE OF SERVICE: 19
Discharge Plan
 
 
Patient Name: JOSEFA LEWIS
Facility: [facility]
Encounter #: [id number]
Medical Record #: [id number]
: 1945
Planned Disposition: 
Anticipated Discharge Date: 19
 
Discharge Date: 
Expected LOS: 4
Initial Reviewer: [email or web address]
Initial Review Date: 01/10/2019
Generated: 19   3:30 pm 
Comments
 
DCP- Discharge Planning
 
Updated by [id number]: Ro Zarate on 19   1:28 pm CT
blood still infusing, spoke with esha at [place] [facility], it is too 
late to transfer at 1730, they will accept patient tomorrow. Patient will 
have to go via EMS
DCP- Discharge Planning
 
Updated by [id number]: oR Zarate on 19  11:07 am CT
SPOKE WITH ESHA AT [facility], UPDATED HER THAT THE PATIENT WAS GETTING BLOOD 
TODAY, DEPENDING ON WHAT TIME THE BLOOD IS COMPLETED THE PATIENT MAY DC.  
ESHA WILL CALL ME BACK AND LET ME KNOW IF THERE IS A TIME THAT IS TOO LATE. 
CM WILL CONTINUE TO FOLLOW AND ASSIST WITH DC PLANNING
DCP- Discharge Planning
 
Updated by [id number]: Eunice An on 19   9:58 am CT
APN PRESENTED TO CM WITH QUESTIONS  REGARDING DISCHARGE TO ACUTE REHAB TODAY. 
PATIENT WITH QUESTIONS REGARDING DISCHARGE. PATIENT IS TEARFUL. STATES SHE 
WAS TOLD SHE WOULD BE LEAVING MONDAY OR TUESDAY. HAD QUESTIONS REGARDING HER 
REMAINING INSURANCE DAYS.  
CM CANNOT OBTAIN INFORMATION REGARDING INSURANCE DAYS OVER THE WEEKEND.   
TC TO [facility] ACUTE REHAB.  
 
SPOKE WITH PHIL BECK. SHE STATES [facility] HAS THE PATIENT SCHEDULED FOR 
MONDAY.  
CM ADVISED THE PATIENT. SPOKE WITH THE PRIMARY NURSE AND APN.  
WILL ASK WEEKDAY CM TO CALL BILLING ON MONDAY TO ADDRESS QUESTIONS REGARDING 
REMAINING HOSPITAL DAYS .
DCP- Discharge Planning
 
Updated by [id number]: Ro Zarate on 19   9:32 am CT
UPDATED CLINICAL SENT TO [facility], I SPOKE WITH ESHA.  SHE DID 
SAY THAT THEY WOULD ACCEPT HER WHEN SHE WAS STABLE TO COME. THEY ONLY ACCEPT 
ON  IF SHE DISCHARGES OVER THE WEEKEND.  I LET ROYCE SANCHEZ AWARE OF 
THIS. CM WILL CONTINUE TO FOLLOW AND ASSIST WITH DC PLANNING
DCP- Discharge Planning
 
Updated by [id number]: Ro Zarate on 1/15/19   2:48 pm CT
REFERRAL SENT TO [facility] [facility], I SPOKE WITH ESHA  
FAX # [phone]  
[facility] # [phone]
DCP- Discharge Planning
 
Updated by [id number]: Ro Zarate on 1/15/19   2:36 pm CT
MET WITH PATIENT SHE WOULD LIKE TO GO TO [facility] TO THEIR INPATIENT 
FACILITY
DCP- Discharge Planning
 
Updated by [id number]: Marcella Canales on 1/10/19   3:30 pm CT
Patient Name: JOSEFA LEWIS                                    
Admission Status: ER  
Accout number: [id number]                             
Admission Date: 01-  
: 4                                                       
Admission Diagnosis:  
Attending: MARLON BOO                                               
Current LOS:  1  
 
Anticipated DC Date: 2019  
Planned Disposition:   
Primary Insurance: MEDICARE A & B  
 
 
Discharge Planning Comments:  
 
CM met with patient to complete initial dc planning assessment.  CM educated 
patient on the CM role and verbal consent given by patient to complete 
assessment.   Patient lives at home with her spouse but was currently at [facility] for therapy. Patient reports she has been in and 
out of the hopstial/rehab since April with infected knee.  At discharge 
patient plans to return to [facility] to complete her rehab and feels this 
is a safe discharge.  Patient reports she is not able to ambulate due to her 
knee and is wheelchair bound. Patient denied known discharge needs at this 
time. CM will continue to follow and will assist as needed with dc 
plans/needs.   
 
 
: Marcella Canales RN, [facility]
 DCPIA - Discharge Planning Initial Assessment
 
Updated by [id number]: Marcella Canales on 1/10/19   4:27 pm
*  Is the patient Alert and Oriented?
Yes
*  How many steps to enter\exit or inside your home? none *  PCP Dr. Primo Santoro * 
Pharmacy
[facility]
*  Preadmission Environment
Skilled Nursing Facility
*  Facility Name
[facility]
*  ADLs
Partial Dependent
*  Partial ADLs (Assistance needed)
Ambulation
Bathing
Dressing
Medication Management
Transfers
*  Equipment
Wheelchair
*  List name and contact numbers for known caregivers / representatives who 
currently or will assist patient after discharge:
Kashif Lewis - spouse - [phone]
*  Verbal permission to speak to the caregivers and representatives has been 
obtained from the patient.
Yes
*  Community resources currently utilized
None
*  Additional services required to return to the preadmission environment?
No
*  Can the patient safely return to the preadmission environment?
Yes
*  Has this patient been hospitalized within the prior 30 days at any 
hospital?
Yes
 
 
 
 
 
 
 
Last DP export: 19  11:10 a
Patient Name: JOSEFA LEWIS
 
Encounter #: [id number]
Page [phone]
 
 
 
 
 
Electronically Signed by KJ LARA on 19 at 1430
 
 
 
 
 
 
**All edits/amendments must be made on the electronic document**
 
DICTATION DATE: 19     : CONNIE  19     
RPT#: [id number]                                DC DATE:        
                                               STATUS: ADM IN  
[facility]
 [place] [address]
***END OF REPORT***

## 2019-01-21 NOTE — MORECARE
CASE MANAGEMENT DISCHARGE SUMMARY
 
 
PATIENT: JOSEFA LEWIS                 UNIT: [id number]
ACCOUNT#: [id number]                       ADM DATE: 01/10/19
AGE: 73     : 43  SEX: F            ROOM/BED: D.2215    
AUTHOR: JANE,DOC                             PHYSICIAN:                               
 
REFERRING PHYSICIAN: MARLON BOO MD                
DATE OF SERVICE: 19
Discharge Plan
 
 
Patient Name: JOSEFA LEWIS
Facility: [facility]
Encounter #: [id number]
Medical Record #: [id number]
: 1945
Planned Disposition: 
Anticipated Discharge Date: 19
 
Discharge Date: 
Expected LOS: 4
Initial Reviewer: [id number]
Initial Review Date: 01/10/2019
Generated: 19   1:10 pm 
Comments
 
DCP- Discharge Planning
 
Updated by [id number]: Ro Zarate on 19  11:07 am CT
SPOKE WITH YAMILE AT [facility], UPDATED HER THAT THE PATIENT WAS GETTING BLOOD 
TODAY, DEPENDING ON WHAT TIME THE BLOOD IS COMPLETED THE PATIENT MAY DC.  
YAMILE WILL CALL ME BACK AND LET ME KNOW IF THERE IS A TIME THAT IS TOO LATE. 
CM WILL CONTINUE TO FOLLOW AND ASSIST WITH DC PLANNING
DCP- Discharge Planning
 
Updated by [id number]: Eunice An on 19   9:58 am CT
APN PRESENTED TO CM WITH QUESTIONS  REGARDING DISCHARGE TO ACUTE REHAB TODAY. 
PATIENT WITH QUESTIONS REGARDING DISCHARGE. PATIENT IS TEARFUL. STATES SHE 
WAS TOLD SHE WOULD BE LEAVING MONDAY OR TUESDAY. HAD QUESTIONS REGARDING HER 
REMAINING INSURANCE DAYS.  
CM CANNOT OBTAIN INFORMATION REGARDING INSURANCE DAYS OVER THE WEEKEND.   
TC TO [facility] ACUTE REHAB.  
SPOKE WITH PHIL BECK. SHE STATES [facility] HAS THE PATIENT SCHEDULED FOR 
MONDAY.  
CM ADVISED THE PATIENT. SPOKE WITH THE PRIMARY NURSE AND APN.  
WILL ASK WEEKDAY CM TO CALL BILLING ON MONDAY TO ADDRESS QUESTIONS REGARDING 
REMAINING HOSPITAL DAYS .
DCP- Discharge Planning
 
 
Updated by [id number]: Ro Zarate on 19   9:32 am CT
UPDATED CLINICAL SENT TO [facility], I SPOKE WITH YAMILE.  SHE DID 
SAY THAT THEY WOULD ACCEPT HER WHEN SHE WAS STABLE TO COME. THEY ONLY ACCEPT 
ON  IF SHE DISCHARGES OVER THE WEEKEND.  I LET ROYCE SANCHEZ AWARE OF 
THIS. CM WILL CONTINUE TO FOLLOW AND ASSIST WITH DC PLANNING
DCP- Discharge Planning
 
Updated by [id number]: Ro Zarate on 1/15/19   2:48 pm CT
REFERRAL SENT TO [facility], I SPOKE WITH YAMILE  
FAX # [phone]  
[facility] # [phone]
DCP- Discharge Planning
 
Updated by [id number]: oR Zarate on 1/15/19   2:36 pm CT
MET WITH PATIENT SHE WOULD LIKE TO GO TO [facility] TO THEIR INPATIENT 
FACILITY
DCP- Discharge Planning
 
Updated by [id number]: Marcella Canales on 1/10/19   3:30 pm CT
Patient Name: JOSEFA LEWIS                                    
Admission Status: ER  
Accout number: [id number]                             
Admission Date: 01-  
: 1945                                                       
Admission Diagnosis:  
Attending: MARLON BOO                                               
Current LOS:  1  
 
Anticipated DC Date: 2019  
Planned Disposition:   
Primary Insurance: MEDICARE A & B  
 
 
Discharge Planning Comments:  
 
CM met with patient to complete initial dc planning assessment.  CM educated 
patient on the CM role and verbal consent given by patient to complete 
assessment.   Patient lives at home with her spouse but was currently at [facility] for [facility]. Patient reports she has been in and 
out of the hopstial/rehab since April with infected knee.  At discharge 
patient plans to return to [facility] to complete her rehab and feels this 
is a safe discharge.  Patient reports she is not able to ambulate due to her 
knee and is wheelchair bound. Patient denied known discharge needs at this 
time. CM will continue to follow and will assist as needed with dc 
plans/needs.   
 
: Marcella Canales RN, [facility]
 DCPIA - Discharge Planning Initial Assessment
 
Updated by [id number]: Marcella Canales on 1/10/19   4:27 pm
*  Is the patient Alert and Oriented?
 
Yes
*  How many steps to enter\exit or inside your home? none *  PCP Dr. Primo Santoro * 
Pharmacy
[facility]
*  Preadmission Environment
Skilled Nursing Facility
*  Facility Name
[facility]
*  ADLs
Partial Dependent
*  Partial ADLs (Assistance needed)
Ambulation
Bathing
Dressing
Medication Management
Transfers
*  Equipment
Wheelchair
*  List name and contact numbers for known caregivers / representatives who 
currently or will assist patient after discharge:
Kashif Lewis - spouse - [phone]
*  Verbal permission to speak to the caregivers and representatives has been 
obtained from the patient.
Yes
*  Community resources currently utilized
None
*  Additional services required to return to the preadmission environment?
No
*  Can the patient safely return to the preadmission environment?
Yes
*  Has this patient been hospitalized within the prior 30 days at any 
hospital?
Yes
 
 
 
 
 
 
 
Last DP export: 19   6:37 a
Patient Name: JOSEFA LEWIS
 
Encounter #: [id number]
Page 57738
 
 
 
 
 
Electronically Signed by KJ LARA on 19 at 1210
 
 
 
 
 
 
**All edits/amendments must be made on the electronic document**
 
DICTATION DATE: 19     : CONNIE  19     
RPT#: [id number]                                DC DATE:        
                                               STATUS: ADM IN  
[facility]
 [place] [address]
***END OF REPORT***

## 2019-01-21 NOTE — MORECARE
CASE MANAGEMENT DISCHARGE SUMMARY
 
 
PATIENT: JOSEFA LEWIS                 UNIT: [id number]
ACCOUNT#: [id number]                       ADM DATE: 01/10/19
AGE: 73     : 43  SEX: F            ROOM/BED: D.2215    
AUTHOR: JANE,DOC                             PHYSICIAN:                               
 
REFERRING PHYSICIAN: MARLON BOO MD                
DATE OF SERVICE: 19
Discharge Plan
 
 
Patient Name: JOSEFA LEWIS
Facility: [facility]:[place]
Encounter #: [id number]
Medical Record #: [id number]
: 1945
Planned Disposition: 
Anticipated Discharge Date: 19
 
Discharge Date: 
Expected LOS: 4
Initial Reviewer: [id number]
Initial Review Date: 01/10/2019
Generated: 19   8:36 am 
Comments
 
DCP- Discharge Planning
 
Updated by [id number]: Eunice An on 19   9:58 am CT
APN PRESENTED TO CM WITH QUESTIONS  REGARDING DISCHARGE TO ACUTE REHAB TODAY. 
PATIENT WITH QUESTIONS REGARDING DISCHARGE. PATIENT IS TEARFUL. STATES SHE 
WAS TOLD SHE WOULD BE LEAVING MONDAY OR TUESDAY. HAD QUESTIONS REGARDING HER 
REMAINING INSURANCE DAYS.  
CM CANNOT OBTAIN INFORMATION REGARDING INSURANCE DAYS OVER THE WEEKEND.   
TC TO [facility] ACUTE REHAB.  
SPOKE WITH PHIL BECK. SHE STATES [facility] HAS THE PATIENT SCHEDULED FOR 
MONDAY.  
CM ADVISED THE PATIENT. SPOKE WITH THE PRIMARY NURSE AND APN.  
WILL ASK WEEKDAY CM TO CALL BILLING ON MONDAY TO ADDRESS QUESTIONS REGARDING 
REMAINING HOSPITAL DAYS .
DCP- Discharge Planning
 
Updated by [id number]: Ro Zarate on 19   9:32 am CT
UPDATED CLINICAL SENT TO [facility], I SPOKE WITH YAMILE.  SHE DID 
SAY THAT THEY WOULD ACCEPT HER WHEN SHE WAS STABLE TO COME. THEY ONLY ACCEPT 
ON  IF SHE DISCHARGES OVER THE WEEKEND.  I LET ROYCE SANCHEZ AWARE OF 
THIS. CM WILL CONTINUE TO FOLLOW AND ASSIST WITH DC PLANNING
DCP- Discharge Planning
 
 
Updated by [id number]: Ro Zarate on 1/15/19   2:48 pm CT
REFERRAL SENT TO [facility], I SPOKE WITH YAMILE  
FAX # [phone]  
[facility] # [phone]
DCP- Discharge Planning
 
Updated by [id number]: Ro Zarate on 1/15/19   2:36 pm CT
MET WITH PATIENT SHE WOULD LIKE TO GO TO [facility] TO THEIR INPATIENT 
FACILITY
DCP- Discharge Planning
 
Updated by [id number]: Marcella Canales on 1/10/19   3:30 pm CT
Patient Name: JOSEFA LEWIS                                    
Admission Status: ER  
Accout number: [id number]                             
Admission Date: 01-  
: 1945                                                       
Admission Diagnosis:  
Attending: MARLON BOO                                               
Current LOS:  1  
 
Anticipated DC Date: 2019  
Planned Disposition:   
Primary Insurance: MEDICARE A & B  
 
 
Discharge Planning Comments:  
 
CM met with patient to complete initial dc planning assessment.  CM educated 
patient on the CM role and verbal consent given by patient to complete 
assessment.   Patient lives at home with her spouse but was currently at [facility] for therapy. Patient reports she has been in and 
out of the hopstial/rehab since April with infected knee.  At discharge 
patient plans to return to [facility] to complete her rehab and feels this 
is a safe discharge.  Patient reports she is not able to ambulate due to her 
knee and is wheelchair bound. Patient denied known discharge needs at this 
time. CM will continue to follow and will assist as needed with dc 
plans/needs.   
 
: Marcella Canales RN, [facility]
 DCPIA - Discharge Planning Initial Assessment
 
Updated by [id number]: Marcella Canales on 1/10/19   4:27 pm
*  Is the patient Alert and Oriented?
Yes
*  How many steps to enter\exit or inside your home? none *  PCP Dr. Primo Santoro * 
Pharmacy
[facility]
*  Preadmission Environment
Skilled Nursing Facility
*  Facility Name
 
[facility]
*  ADLs
Partial Dependent
*  Partial ADLs (Assistance needed)
Ambulation
Bathing
Dressing
Medication Management
Transfers
*  Equipment
Wheelchair
*  List name and contact numbers for known caregivers / representatives who 
currently or will assist patient after discharge:
Kashif Lewis - spouse - [phone]
*  Verbal permission to speak to the caregivers and representatives has been 
obtained from the patient.
Yes
*  Community resources currently utilized
None
*  Additional services required to return to the preadmission environment?
No
*  Can the patient safely return to the preadmission environment?
Yes
*  Has this patient been hospitalized within the prior 30 days at any 
hospital?
Yes
 
 
 
 
 
 
 
Last DP export: 19  10:03 a
Patient Name: JOSEFA LEWIS
 
Encounter #: [id number]
Page [phone]
 
 
 
 
 
Electronically Signed by KJ LARA on 19 at 0737
 
 
 
 
 
 
**All edits/amendments must be made on the electronic document**
 
DICTATION DATE: 19     : CONNIE  19     
RPT#: [id number]                                DC DATE:        
                                               STATUS: ADM IN  
[facility]
 [place] [address]
***END OF REPORT***

## 2019-01-22 VITALS — SYSTOLIC BLOOD PRESSURE: 126 MMHG | DIASTOLIC BLOOD PRESSURE: 62 MMHG

## 2019-01-22 VITALS — DIASTOLIC BLOOD PRESSURE: 72 MMHG | SYSTOLIC BLOOD PRESSURE: 130 MMHG

## 2019-01-22 VITALS — SYSTOLIC BLOOD PRESSURE: 117 MMHG | DIASTOLIC BLOOD PRESSURE: 54 MMHG

## 2019-01-22 VITALS — SYSTOLIC BLOOD PRESSURE: 129 MMHG | DIASTOLIC BLOOD PRESSURE: 67 MMHG

## 2019-01-22 VITALS — DIASTOLIC BLOOD PRESSURE: 58 MMHG | SYSTOLIC BLOOD PRESSURE: 125 MMHG

## 2019-01-22 LAB
%HYPO/RBC NFR BLD AUTO: (no result) %
ALBUMIN SERPL-MCNC: 1.4 G/DL (ref 3.4–5)
ALP SERPL-CCNC: 79 U/L (ref 46–116)
ALT SERPL-CCNC: 12 U/L (ref 10–68)
ANION GAP SERPL CALC-SCNC: 13.6 MMOL/L (ref 8–16)
BILIRUB SERPL-MCNC: 0.45 MG/DL (ref 0.2–1.3)
BUN SERPL-MCNC: 20 MG/DL (ref 7–18)
CALCIUM SERPL-MCNC: 8.2 MG/DL (ref 8.5–10.1)
CHLORIDE SERPL-SCNC: 109 MMOL/L (ref 98–107)
CO2 SERPL-SCNC: 22.2 MMOL/L (ref 21–32)
CREAT SERPL-MCNC: 1.8 MG/DL (ref 0.6–1.3)
ERYTHROCYTE [DISTWIDTH] IN BLOOD BY AUTOMATED COUNT: 15.8 % (ref 11.5–14.5)
GLOBULIN SER-MCNC: 3.2 G/L
GLUCOSE SERPL-MCNC: 108 MG/DL (ref 74–106)
HCT VFR BLD CALC: 32.1 % (ref 36–48)
HGB BLD-MCNC: 10.9 G/DL (ref 12–16)
LYMPHOCYTES NFR BLD AUTO: 9 % (ref 15–50)
MCH RBC QN AUTO: 29.1 PG (ref 26–34)
MCHC RBC AUTO-ENTMCNC: 34 G/DL (ref 31–37)
MCV RBC: 85.8 FL (ref 80–100)
MONOCYTES NFR BLD: 6 % (ref 2–11)
NEUTROPHILS NFR BLD AUTO: 74 % (ref 40–80)
OSMOLALITY SERPL CALC.SUM OF ELEC: 284 MOSM/KG (ref 275–300)
PLATELET # BLD EST: NORMAL 10*3/UL
PLATELET # BLD: 213 10X3/UL (ref 130–400)
PMV BLD AUTO: 10.1 FL (ref 7.4–10.4)
POTASSIUM SERPL-SCNC: 3.8 MMOL/L (ref 3.5–5.1)
PROT SERPL-MCNC: 4.6 G/DL (ref 6.4–8.2)
RBC # BLD AUTO: 3.74 10X6/UL (ref 4–5.4)
ROULEAUX BLD QL SMEAR: (no result)
SODIUM SERPL-SCNC: 141 MMOL/L (ref 136–145)
WBC # BLD AUTO: 25 10X3/UL (ref 4.8–10.8)

## 2019-01-22 NOTE — MORECARE
CASE MANAGEMENT DISCHARGE SUMMARY
 
 
PATIENT: JOSEFA LEWIS                 UNIT: [id number]
ACCOUNT#: [id number]                       ADM DATE: 01/10/19
AGE: 73     : 43  SEX: F            ROOM/BED: D.2215    
AUTHOR: JANE,DOC                             PHYSICIAN:                               
 
REFERRING PHYSICIAN: MARLON BOO MD                
DATE OF SERVICE: 19
Discharge Plan
 
 
Patient Name: JOSEFA LEWIS
Facility: [facility]:[place]
Encounter #: [id number]
Medical Record #: [id number]
: 1945
Planned Disposition: 
Anticipated Discharge Date: 19
 
Discharge Date: 
Expected LOS: 4
Initial Reviewer: [id number]
Initial Review Date: 01/10/2019
Generated: 19   1:57 pm 
Comments
 
DCP- Discharge Planning
 
Updated by [id number]: Ro Zarate on 19  11:51 am CT
PATIENT WITH AN ELEVATED WBC, DISCHARGE CANCELLED
DCP- Discharge Planning
 
Updated by [id number]: Ro Zarate on 19   1:28 pm CT
blood still infusing, spoke with esha at [facility], it is too 
late to transfer at 1730, they will accept patient tomorrow. Patient will 
have to go via EMS
DCP- Discharge Planning
 
Updated by [id number]: Ro Zarate on 19  11:07 am CT
SPOKE WITH ESHA AT [facility], UPDATED HER THAT THE PATIENT WAS GETTING BLOOD 
TODAY, DEPENDING ON WHAT TIME THE BLOOD IS COMPLETED THE PATIENT MAY DC.  
ESHA WILL CALL ME BACK AND LET ME KNOW IF THERE IS A TIME THAT IS TOO LATE. 
CM WILL CONTINUE TO FOLLOW AND ASSIST WITH DC PLANNING
DCP- Discharge Planning
 
Updated by [id number]: Eunice An on 19   9:58 am CT
APN PRESENTED TO CM WITH QUESTIONS  REGARDING DISCHARGE TO ACUTE REHAB TODAY. 
PATIENT WITH QUESTIONS REGARDING DISCHARGE. PATIENT IS TEARFUL. STATES SHE 
 
WAS TOLD SHE WOULD BE LEAVING MONDAY OR TUESDAY. HAD QUESTIONS REGARDING HER 
REMAINING INSURANCE DAYS.  
CM CANNOT OBTAIN INFORMATION REGARDING INSURANCE DAYS OVER THE WEEKEND.   
TC TO [facility] ACUTE REHAB.  
SPOKE WITH PHIL BECK. SHE STATES [facility] HAS THE PATIENT SCHEDULED FOR 
MONDAY.  
CM ADVISED THE PATIENT. SPOKE WITH THE PRIMARY NURSE AND APN.  
WILL ASK WEEKDAY CM TO CALL BILLING ON MONDAY TO ADDRESS QUESTIONS REGARDING 
REMAINING HOSPITAL DAYS .
DCP- Discharge Planning
 
Updated by [id number]: Ro Zarate on 19   9:32 am CT
UPDATED CLINICAL SENT TO [facility], I SPOKE WITH ESHA.  SHE DID 
SAY THAT THEY WOULD ACCEPT HER WHEN SHE WAS STABLE TO COME. THEY ONLY ACCEPT 
ON  IF SHE DISCHARGES OVER THE WEEKEND.  I LET ROYCE SANCHEZ AWARE OF 
THIS. CM WILL CONTINUE TO FOLLOW AND ASSIST WITH DC PLANNING
DCP- Discharge Planning
 
Updated by [id number]: Ro Crawfordken on 1/15/19   2:48 pm CT
REFERRAL SENT TO [facility], I SPOKE WITH [facility]  
FAX # [phone]  
[facility] # [phone]
DCP- Discharge Planning
 
Updated by [id number]: Ro Crawfordken on 1/15/19   2:36 pm CT
MET WITH PATIENT SHE WOULD LIKE TO GO TO [facility] TO THEIR INPATIENT 
FACILITY
DCP- Discharge Planning
 
Updated by [id number]: Marcella Canales on 1/10/19   3:30 pm CT
Patient Name: JOSEFA LEWIS                                    
Admission Status: ER  
Accout number: [id number]                             
Admission Date: 01-  
: 1945                                                       
Admission Diagnosis:  
Attending: MARLON BOO                                               
Current LOS:  1  
 
Anticipated DC Date: 2019  
Planned Disposition:   
Primary Insurance: MEDICARE A & B  
 
 
Discharge Planning Comments:  
 
CM met with patient to complete initial dc planning assessment.  CM educated 
patient on the CM role and verbal consent given by patient to complete 
assessment.   Patient lives at home with her spouse but was currently at [facility] for therapy. Patient reports she has been in and 
out of the hopstial/rehab since April with infected knee.  At discharge 
patient plans to return to [facility] to complete her rehab and feels this 
 
is a safe discharge.  Patient reports she is not able to ambulate due to her 
knee and is wheelchair bound. Patient denied known discharge needs at this 
time. CM will continue to follow and will assist as needed with dc 
plans/needs.   
 
: Marcella Canales RN, [facility]
 DCPIA - Discharge Planning Initial Assessment
 
Updated by [id number]: Marcella Canales on 1/10/19   4:27 pm
*  Is the patient Alert and Oriented?
Yes
*  How many steps to enter\exit or inside your home? none *  PCP Dr. Primo Santoro * 
Pharmacy
[facility]
*  Preadmission Environment
Skilled Nursing Facility
*  Facility Name
[facility]
*  ADLs
Partial Dependent
*  Partial ADLs (Assistance needed)
Ambulation
Bathing
Dressing
Medication Management
Transfers
*  Equipment
Wheelchair
*  List name and contact numbers for known caregivers / representatives who 
currently or will assist patient after discharge:
Kashif Lewis - [facility] - [phone]
*  Verbal permission to speak to the caregivers and representatives has been 
obtained from the patient.
Yes
*  Community resources currently utilized
None
*  Additional services required to return to the preadmission environment?
No
*  Can the patient safely return to the preadmission environment?
Yes
*  Has this patient been hospitalized within the prior 30 days at any 
hospital?
Yes
 
 
 
 
 
 
 
Last DP export: 19   1:30 p
Patient Name: JOSEFA LEWIS
 
Encounter #: [id number]
Page [id number]
 
 
 
 
 
Electronically Signed by KJ LARA on 19 at 1257
 
 
 
 
 
 
**All edits/amendments must be made on the electronic document**
 
DICTATION DATE: 19 1257     : CONNIE  19 1257     
RPT#: [id number]                                DC DATE:        
                                               STATUS: ADM IN  
[facility]
[address] [place] [address]
***END OF REPORT***

## 2019-01-23 VITALS — SYSTOLIC BLOOD PRESSURE: 117 MMHG | DIASTOLIC BLOOD PRESSURE: 63 MMHG

## 2019-01-23 VITALS — SYSTOLIC BLOOD PRESSURE: 145 MMHG | DIASTOLIC BLOOD PRESSURE: 68 MMHG

## 2019-01-23 VITALS — DIASTOLIC BLOOD PRESSURE: 62 MMHG | SYSTOLIC BLOOD PRESSURE: 134 MMHG

## 2019-01-23 VITALS — SYSTOLIC BLOOD PRESSURE: 115 MMHG | DIASTOLIC BLOOD PRESSURE: 80 MMHG

## 2019-01-23 VITALS — SYSTOLIC BLOOD PRESSURE: 129 MMHG | DIASTOLIC BLOOD PRESSURE: 64 MMHG

## 2019-01-23 LAB
ALBUMIN SERPL-MCNC: 1.3 G/DL (ref 3.4–5)
ALP SERPL-CCNC: 80 U/L (ref 46–116)
ALT SERPL-CCNC: 11 U/L (ref 10–68)
ANION GAP SERPL CALC-SCNC: 13.3 MMOL/L (ref 8–16)
BASOPHILS NFR BLD AUTO: 0.2 % (ref 0–2)
BILIRUB SERPL-MCNC: 0.4 MG/DL (ref 0.2–1.3)
BUN SERPL-MCNC: 24 MG/DL (ref 7–18)
CALCIUM SERPL-MCNC: 8.4 MG/DL (ref 8.5–10.1)
CHLORIDE SERPL-SCNC: 110 MMOL/L (ref 98–107)
CO2 SERPL-SCNC: 21.3 MMOL/L (ref 21–32)
CREAT SERPL-MCNC: 1.9 MG/DL (ref 0.6–1.3)
EOSINOPHIL NFR BLD: 2.6 % (ref 0–7)
ERYTHROCYTE [DISTWIDTH] IN BLOOD BY AUTOMATED COUNT: 16.1 % (ref 11.5–14.5)
GLOBULIN SER-MCNC: 3.8 G/L
GLUCOSE SERPL-MCNC: 75 MG/DL (ref 74–106)
HCT VFR BLD CALC: 31.8 % (ref 36–48)
HGB BLD-MCNC: 10.6 G/DL (ref 12–16)
IMM GRANULOCYTES NFR BLD: 0.7 % (ref 0–5)
LYMPHOCYTES NFR BLD AUTO: 14.1 % (ref 15–50)
MCH RBC QN AUTO: 28.8 PG (ref 26–34)
MCHC RBC AUTO-ENTMCNC: 33.3 G/DL (ref 31–37)
MCV RBC: 86.4 FL (ref 80–100)
MONOCYTES NFR BLD: 7.9 % (ref 2–11)
NEUTROPHILS NFR BLD AUTO: 74.5 % (ref 40–80)
OSMOLALITY SERPL CALC.SUM OF ELEC: 283 MOSM/KG (ref 275–300)
PLATELET # BLD: 181 10X3/UL (ref 130–400)
PMV BLD AUTO: 9.9 FL (ref 7.4–10.4)
POTASSIUM SERPL-SCNC: 3.6 MMOL/L (ref 3.5–5.1)
PROT SERPL-MCNC: 5.1 G/DL (ref 6.4–8.2)
RBC # BLD AUTO: 3.68 10X6/UL (ref 4–5.4)
SODIUM SERPL-SCNC: 141 MMOL/L (ref 136–145)
WBC # BLD AUTO: 10.4 10X3/UL (ref 4.8–10.8)

## 2019-01-24 VITALS — SYSTOLIC BLOOD PRESSURE: 148 MMHG | DIASTOLIC BLOOD PRESSURE: 66 MMHG

## 2019-01-24 VITALS — DIASTOLIC BLOOD PRESSURE: 55 MMHG | SYSTOLIC BLOOD PRESSURE: 130 MMHG

## 2019-01-24 VITALS — SYSTOLIC BLOOD PRESSURE: 143 MMHG | DIASTOLIC BLOOD PRESSURE: 64 MMHG

## 2019-01-24 LAB
ALBUMIN SERPL-MCNC: 1.4 G/DL (ref 3.4–5)
ALP SERPL-CCNC: 93 U/L (ref 46–116)
ALT SERPL-CCNC: 12 U/L (ref 10–68)
ANION GAP SERPL CALC-SCNC: 13.5 MMOL/L (ref 8–16)
BASOPHILS NFR BLD AUTO: 0.3 % (ref 0–2)
BILIRUB SERPL-MCNC: 0.51 MG/DL (ref 0.2–1.3)
BUN SERPL-MCNC: 24 MG/DL (ref 7–18)
CALCIUM SERPL-MCNC: 8.4 MG/DL (ref 8.5–10.1)
CHLORIDE SERPL-SCNC: 110 MMOL/L (ref 98–107)
CO2 SERPL-SCNC: 22.2 MMOL/L (ref 21–32)
CREAT SERPL-MCNC: 1.9 MG/DL (ref 0.6–1.3)
EOSINOPHIL NFR BLD: 5.3 % (ref 0–7)
ERYTHROCYTE [DISTWIDTH] IN BLOOD BY AUTOMATED COUNT: 16.3 % (ref 11.5–14.5)
GLOBULIN SER-MCNC: 4.1 G/L
GLUCOSE SERPL-MCNC: 79 MG/DL (ref 74–106)
HCT VFR BLD CALC: 33.6 % (ref 36–48)
HGB BLD-MCNC: 11.1 G/DL (ref 12–16)
IMM GRANULOCYTES NFR BLD: 0.8 % (ref 0–5)
LYMPHOCYTES NFR BLD AUTO: 19 % (ref 15–50)
MCH RBC QN AUTO: 29.1 PG (ref 26–34)
MCHC RBC AUTO-ENTMCNC: 33 G/DL (ref 31–37)
MCV RBC: 88.2 FL (ref 80–100)
MONOCYTES NFR BLD: 12 % (ref 2–11)
NEUTROPHILS NFR BLD AUTO: 62.6 % (ref 40–80)
OSMOLALITY SERPL CALC.SUM OF ELEC: 285 MOSM/KG (ref 275–300)
PLATELET # BLD: 216 10X3/UL (ref 130–400)
PMV BLD AUTO: 10.1 FL (ref 7.4–10.4)
POTASSIUM SERPL-SCNC: 3.7 MMOL/L (ref 3.5–5.1)
PROT SERPL-MCNC: 5.5 G/DL (ref 6.4–8.2)
RBC # BLD AUTO: 3.81 10X6/UL (ref 4–5.4)
SODIUM SERPL-SCNC: 142 MMOL/L (ref 136–145)
WBC # BLD AUTO: 6.4 10X3/UL (ref 4.8–10.8)

## 2019-01-25 VITALS — SYSTOLIC BLOOD PRESSURE: 144 MMHG | DIASTOLIC BLOOD PRESSURE: 62 MMHG

## 2019-01-25 VITALS — SYSTOLIC BLOOD PRESSURE: 124 MMHG | DIASTOLIC BLOOD PRESSURE: 55 MMHG

## 2019-01-25 VITALS — SYSTOLIC BLOOD PRESSURE: 137 MMHG | DIASTOLIC BLOOD PRESSURE: 62 MMHG

## 2019-01-25 VITALS — DIASTOLIC BLOOD PRESSURE: 58 MMHG | SYSTOLIC BLOOD PRESSURE: 145 MMHG

## 2019-01-25 LAB
ALBUMIN SERPL-MCNC: 1.3 G/DL (ref 3.4–5)
ALP SERPL-CCNC: 94 U/L (ref 46–116)
ALT SERPL-CCNC: 8 U/L (ref 10–68)
ANION GAP SERPL CALC-SCNC: 14.2 MMOL/L (ref 8–16)
BASOPHILS NFR BLD AUTO: 0.5 % (ref 0–2)
BILIRUB SERPL-MCNC: 0.33 MG/DL (ref 0.2–1.3)
BUN SERPL-MCNC: 22 MG/DL (ref 7–18)
CALCIUM SERPL-MCNC: 8.3 MG/DL (ref 8.5–10.1)
CHLORIDE SERPL-SCNC: 111 MMOL/L (ref 98–107)
CO2 SERPL-SCNC: 20.2 MMOL/L (ref 21–32)
CREAT SERPL-MCNC: 1.8 MG/DL (ref 0.6–1.3)
EOSINOPHIL NFR BLD: 6 % (ref 0–7)
ERYTHROCYTE [DISTWIDTH] IN BLOOD BY AUTOMATED COUNT: 16 % (ref 11.5–14.5)
GLOBULIN SER-MCNC: 3.8 G/L
GLUCOSE SERPL-MCNC: 83 MG/DL (ref 74–106)
HCT VFR BLD CALC: 33 % (ref 36–48)
HGB BLD-MCNC: 10.8 G/DL (ref 12–16)
IMM GRANULOCYTES NFR BLD: 0.9 % (ref 0–5)
LYMPHOCYTES NFR BLD AUTO: 22.7 % (ref 15–50)
MCH RBC QN AUTO: 29 PG (ref 26–34)
MCHC RBC AUTO-ENTMCNC: 32.7 G/DL (ref 31–37)
MCV RBC: 88.5 FL (ref 80–100)
MONOCYTES NFR BLD: 11.1 % (ref 2–11)
NEUTROPHILS NFR BLD AUTO: 58.8 % (ref 40–80)
OSMOLALITY SERPL CALC.SUM OF ELEC: 284 MOSM/KG (ref 275–300)
PLATELET # BLD: 201 10X3/UL (ref 130–400)
PMV BLD AUTO: 10.1 FL (ref 7.4–10.4)
POTASSIUM SERPL-SCNC: 3.4 MMOL/L (ref 3.5–5.1)
PROT SERPL-MCNC: 5.1 G/DL (ref 6.4–8.2)
RBC # BLD AUTO: 3.73 10X6/UL (ref 4–5.4)
SODIUM SERPL-SCNC: 142 MMOL/L (ref 136–145)
WBC # BLD AUTO: 4.3 10X3/UL (ref 4.8–10.8)

## 2019-01-25 NOTE — MORECARE
CASE MANAGEMENT DISCHARGE SUMMARY
 
 
PATIENT: JOSEFA LEWIS                 UNIT: [id number]
ACCOUNT#: [id number]                       ADM DATE: 01/10/19
AGE: 73     : 43  SEX: F            ROOM/BED: D.2215    
AUTHOR: JAEN,DOC                             PHYSICIAN:                               
 
REFERRING PHYSICIAN: MARLON BOO MD                
DATE OF SERVICE: 19
Discharge Plan
 
 
Patient Name: JOSEFA LEWIS
Facility: [facility]:[place]
Encounter #: [id number]
Medical Record #: [id number]
: 1945
Planned Disposition: 
Anticipated Discharge Date: 19
 
Discharge Date: 
Expected LOS: 4
Initial Reviewer: [id number]
Initial Review Date: 01/10/2019
Generated: 19   4:23 pm 
Comments
 
DCP- Discharge Planning
 
Updated by [id number]: Ro Zarate on 19   2:20 pm CT
ESHA WITH [facility] STATED THAT IF PATIENT IS STILL STABLE ON 
 THEY WILL ACCEPT PATIENT, SHE WILL NEED TO TRANSFER VIA EMS. AVIS AG AND DR MONTALVO AWARE. CM WILL CONTINUE TO FOLLOW AND ASSIST WITH DC 
PLANNING
DCP- Discharge Planning
 
Updated by [id number]: Ro Zarate on 19  12:51 pm CT
SENT CLINICAL UPDATES TO [facility], TO SEE IF THEY WILL ACCEPT PATIENT. CM WILL 
CONTINUE TO FOLLOW WITH DC PLANNING   
PATIENT'S FRIEND PHIL LEONARDO [phone]
DCP- Discharge Planning
 
Updated by [id number]: Ro Zarate on 19  11:51 am CT
PATIENT WITH AN ELEVATED WBC, DISCHARGE CANCELLED
DCP- Discharge Planning
 
Updated by [id number]: Ro Zarate on 19   1:28 pm CT
blood still infusing, spoke with esha at [place], it is too 
late to transfer at 1730, they will accept patient tomorrow. Patient will 
 
have to go via EMS
DCP- Discharge Planning
 
Updated by [id number]: Ro Zarate on 19  11:07 am CT
SPOKE WITH ESHA AT [facility], UPDATED HER THAT THE PATIENT WAS GETTING BLOOD 
TODAY, DEPENDING ON WHAT TIME THE BLOOD IS COMPLETED THE PATIENT MAY DC.  
ESHA WILL CALL ME BACK AND LET ME KNOW IF THERE IS A TIME THAT IS TOO LATE. 
CM WILL CONTINUE TO FOLLOW AND ASSIST WITH DC PLANNING
DCP- Discharge Planning
 
Updated by [id number]: Eunice An on 19   9:58 am CT
APN PRESENTED TO CM WITH QUESTIONS  REGARDING DISCHARGE TO ACUTE REHAB TODAY. 
PATIENT WITH QUESTIONS REGARDING DISCHARGE. PATIENT IS TEARFUL. STATES SHE 
WAS TOLD SHE WOULD BE LEAVING MONDAY OR TUESDAY. HAD QUESTIONS REGARDING HER 
REMAINING INSURANCE DAYS.  
CM CANNOT OBTAIN INFORMATION REGARDING INSURANCE DAYS OVER THE WEEKEND.   
TC TO [facility] ACUTE REHAB.  
SPOKE WITH PHIL BECK. SHE STATES [facility] HAS THE PATIENT SCHEDULED FOR 
MONDAY.  
CM ADVISED THE PATIENT. SPOKE WITH THE PRIMARY NURSE AND APN.  
WILL ASK WEEKDAY CM TO CALL BILLING ON MONDAY TO ADDRESS QUESTIONS REGARDING 
REMAINING HOSPITAL DAYS .
DCP- Discharge Planning
 
Updated by [id number]: Ro Zarate on 19   9:32 am CT
UPDATED CLINICAL SENT TO [facility], I SPOKE WITH ESHA.  SHE DID 
SAY THAT THEY WOULD ACCEPT HER WHEN SHE WAS STABLE TO COME. THEY ONLY ACCEPT 
ON  IF SHE DISCHARGES OVER THE WEEKEND.  I LET ROYCE SANCHEZ AWARE OF 
THIS. CM WILL CONTINUE TO FOLLOW AND ASSIST WITH DC PLANNING
DCP- Discharge Planning
 
Updated by [id number]: Ro Zarate on 1/15/19   2:48 pm CT
REFERRAL SENT TO [facility], I SPOKE WITH ESHA  
FAX # [phone]  
[facility] # [phone]
DCP- Discharge Planning
 
Updated by [id number]: Ro Zarate on 1/15/19   2:36 pm CT
MET WITH PATIENT SHE WOULD LIKE TO GO TO [facility] TO THEIR INPATIENT 
FACILITY
DCP- Discharge Planning
 
Updated by [id number]: Marcella Canales on 1/10/19   3:30 pm CT
Patient Name: JOSEFA LEWIS                                    
Admission Status: ER  
Accout number: [id number]                             
Admission Date: 01-  
: 1945                                                       
Admission Diagnosis:  
Attending: MARLON BOO                                               
Current LOS:  1  
 
 
Anticipated DC Date: 2019  
Planned Disposition:   
Primary Insurance: MEDICARE A & B  
 
 
Discharge Planning Comments:  
 
CM met with patient to complete initial dc planning assessment.  CM educated 
patient on the CM role and verbal consent given by patient to complete 
assessment.   Patient lives at home with her spouse but was currently at [facility] for therapy. Patient reports she has been in and 
out of the hopstial/rehab since April with infected knee.  At discharge 
patient plans to return to [facility] to complete her rehab and feels this 
is a safe discharge.  Patient reports she is not able to ambulate due to her 
knee and is wheelchair bound. Patient denied known discharge needs at this 
time. CM will continue to follow and will assist as needed with dc 
plans/needs.   
 
: Marcella Canales RN, [facility]
 DCPIA - Discharge Planning Initial Assessment
 
Updated by [id number]: Marcella Canales on 1/10/19   4:27 pm
*  Is the patient Alert and Oriented?
Yes
*  How many steps to enter\exit or inside your home? none *  PCP Dr. Primo Santoro * 
Pharmacy
[facility]
*  Preadmission Environment
Skilled Nursing Facility
*  [facility]ility Name
[facility]
*  ADLs
Partial Dependent
*  Partial ADLs (Assistance needed)
Ambulation
Bathing
Dressing
Medication Management
Transfers
*  Equipment
Wheelchair
*  List name and contact numbers for known caregivers / representatives who 
currently or will assist patient after discharge:
Kashif Lewis - spouse - [phone]
*  Verbal permission to speak to the caregivers and representatives has been 
obtained from the patient.
Yes
*  Community resources currently utilized
None
*  Additional services required to return to the preadmission environment?
No
*  Can the patient safely return to the preadmission environment?
 
Yes
*  Has this patient been hospitalized within the prior 30 days at any 
hospital?
Yes
 
 
 
 
 
 
 
Last DP export: 19  12:58 p
Patient Name: JOSEFA LEWIS
Encounter #: [id number]
Page [id number]
 
 
 
 
 
Electronically Signed by KJ LARA on 19 at 1523
 
 
 
 
 
 
**All edits/amendments must be made on the electronic document**
 
DICTATION DATE: 19 152     : CONNIE  19 152     
RPT#: [id number]                                DC DATE:        
                                               STATUS: ADM IN  
[facility]
 [place] [address]
***END OF REPORT***

## 2019-01-25 NOTE — MORECARE
CASE MANAGEMENT DISCHARGE SUMMARY
 
 
PATIENT: JOSEFA LEWIS                 UNIT: [id number]
ACCOUNT#: [id number]                       ADM DATE: 01/10/19
AGE: 73     : 43  SEX: F            ROOM/BED: D.2215    
AUTHOR: JANE,DOC                             PHYSICIAN:                               
 
REFERRING PHYSICIAN: MARLON BOO MD                
DATE OF SERVICE: 19
Discharge Plan
 
 
Patient Name: JOSEFA LEWIS
Facility: [facility]:[place]
Encounter #: [id number]
Medical Record #: [id number]
: 1945
Planned Disposition: 
Anticipated Discharge Date: 19
 
Discharge Date: 
Expected LOS: 4
Initial Reviewer: [id number]
Initial Review Date: 01/10/2019
Generated: 19   2:58 pm 
Comments
 
DCP- Discharge Planning
 
Updated by [id number]: Ro Zarate on 19  12:51 pm CT
SENT CLINICAL UPDATES TO [facility], TO SEE IF THEY WILL ACCEPT PATIENT. CM WILL 
CONTINUE TO FOLLOW WITH DC PLANNING   
PATIENT'S FRIEND PHIL LEONARDO [phone]
DCP- Discharge Planning
 
Updated by [id number]: Ro Zarate on 19  11:51 am CT
PATIENT WITH AN ELEVATED WBC, DISCHARGE CANCELLED
DCP- Discharge Planning
 
Updated by [id number]: Ro Zarate on 19   1:28 pm CT
blood still infusing, spoke with esha at [facility], it is too 
late to transfer at 1730, they will accept patient tomorrow. Patient will 
have to go via EMS
DCP- Discharge Planning
 
Updated by [id number]: Ro Zarate on 19  11:07 am CT
SPOKE WITH ESHA AT [facility] [place], UPDATED HER THAT THE PATIENT WAS GETTING BLOOD 
TODAY, DEPENDING ON WHAT TIME THE BLOOD IS COMPLETED THE PATIENT MAY DC.  
ESHA WILL CALL ME BACK AND LET ME KNOW IF THERE IS A TIME THAT IS TOO LATE. 
 
CM WILL CONTINUE TO FOLLOW AND ASSIST WITH DC PLANNING
DCP- Discharge Planning
 
Updated by [id number]: Eunice An on 19   9:58 am CT
APN PRESENTED TO CM WITH QUESTIONS  REGARDING DISCHARGE TO ACUTE REHAB TODAY. 
PATIENT WITH QUESTIONS REGARDING DISCHARGE. PATIENT IS TEARFUL. STATES SHE 
WAS TOLD SHE WOULD BE LEAVING MONDAY OR TUESDAY. HAD QUESTIONS REGARDING HER 
REMAINING INSURANCE DAYS.  
CM CANNOT OBTAIN INFORMATION REGARDING INSURANCE DAYS OVER THE WEEKEND.   
TC TO [facility] ACUTE REHAB.  
SPOKE WITH PHIL BECK. SHE STATES [facility] HAS THE PATIENT SCHEDULED FOR 
MONDAY.  
CM ADVISED THE PATIENT. SPOKE WITH THE PRIMARY NURSE AND APN.  
WILL ASK WEEKDAY CM TO CALL BILLING ON MONDAY TO ADDRESS QUESTIONS REGARDING 
REMAINING HOSPITAL DAYS .
DCP- Discharge Planning
 
Updated by [id number]: Ro Zarate on 19   9:32 am CT
UPDATED CLINICAL SENT TO [facility], I SPOKE WITH [facility].  SHE DID 
SAY THAT THEY WOULD ACCEPT HER WHEN SHE WAS STABLE TO COME. THEY ONLY ACCEPT 
ON  IF SHE DISCHARGES OVER THE WEEKEND.  I LET ROYCE SANCHEZ AWARE OF 
THIS. CM WILL CONTINUE TO FOLLOW AND ASSIST WITH DC PLANNING
DCP- Discharge Planning
 
Updated by [id number]: Ro Zarate on 1/15/19   2:48 pm CT
[facility]  
FAX # [phone]  
[facility] # [phone]
DCP- Discharge Planning
 
Updated by [id number]: Ro Zarate on 1/15/19   2:36 pm CT
MET WITH PATIENT SHE WOULD LIKE TO GO TO [facility] TO THEIR INPATIENT 
FACILITY
DCP- Discharge Planning
 
Updated by [id number]: Marcella Canales on 1/10/19   3:30 pm CT
Patient Name: JOSEFA LEWIS                                    
Admission Status: ER  
Accout number: [id number]                             
Admission Date: 01-  
: 1945                                                       
Admission Diagnosis:  
Attending: MARLON BOO                                               
Current LOS:  1  
 
Anticipated DC Date: 2019  
Planned Disposition:   
Primary Insurance: MEDICARE A & B  
 
 
Discharge Planning Comments:  
 
 
CM met with patient to complete initial dc planning assessment.  CM educated 
patient on the CM role and verbal consent given by patient to complete 
assessment.   Patient lives at home with her spouse but was currently at [facility] for therapy. Patient reports she has been in and 
out of the hopstial/rehab since April with infected knee.  At discharge 
patient plans to return to [facility] to complete her rehab and feels this 
is a safe discharge.  Patient reports she is not able to ambulate due to her 
knee and is wheelchair bound. Patient denied known discharge needs at this 
time. CM will continue to follow and will assist as needed with dc 
plans/needs.   
 
: Marcella Canales RN, [facility]
 DCPIA - Discharge Planning Initial Assessment
 
Updated by [id number]: Marcella Canales on 1/10/19   4:27 pm
*  Is the patient Alert and Oriented?
Yes
*  How many steps to enter\exit or inside your home? none *  PCP Dr. Primo Santoro * 
Pharmacy
[facility]
*  Preadmission Environment
Skilled Nursing Facility
*  Facility Name
[facility]
*  ADLs
Partial Dependent
*  Partial ADLs (Assistance needed)
Ambulation
Bathing
Dressing
Medication Management
Transfers
*  Equipment
Wheelchair
*  List name and contact numbers for known caregivers / representatives who 
currently or will assist patient after discharge:
Kashif Lewis - spouse - [phone]
*  Verbal permission to speak to the caregivers and representatives has been 
obtained from the patient.
Yes
*  Community resources currently utilized
None
*  Additional services required to return to the preadmission environment?
No
*  Can the patient safely return to the preadmission environment?
Yes
*  Has this patient been hospitalized within the prior 30 days at any 
hospital?
Yes
 
 
 
 
 
 
 
 
Last DP export: 19  11:57 a
Patient Name: JOSEFA LEWIS
Encounter #: [id number]
Page [id number]
 
 
 
 
 
Electronically Signed by KJ LARA on 19 at 1358
 
 
 
 
 
 
**All edits/amendments must be made on the electronic document**
 
DICTATION DATE: 198     : CONNIE  19 1357     
RPT#: [id number]                                DC DATE:        
                                               STATUS: ADM IN  
[facility]
[address] [place] [address]
***END OF REPORT***

## 2019-01-26 VITALS — DIASTOLIC BLOOD PRESSURE: 57 MMHG | SYSTOLIC BLOOD PRESSURE: 146 MMHG

## 2019-01-26 VITALS — SYSTOLIC BLOOD PRESSURE: 140 MMHG | DIASTOLIC BLOOD PRESSURE: 73 MMHG

## 2019-01-26 VITALS — SYSTOLIC BLOOD PRESSURE: 148 MMHG | DIASTOLIC BLOOD PRESSURE: 58 MMHG

## 2019-01-26 VITALS — DIASTOLIC BLOOD PRESSURE: 52 MMHG | SYSTOLIC BLOOD PRESSURE: 146 MMHG

## 2019-01-26 VITALS — DIASTOLIC BLOOD PRESSURE: 52 MMHG | SYSTOLIC BLOOD PRESSURE: 139 MMHG

## 2019-01-26 VITALS — DIASTOLIC BLOOD PRESSURE: 65 MMHG | SYSTOLIC BLOOD PRESSURE: 152 MMHG

## 2019-01-26 LAB
ALBUMIN SERPL-MCNC: 1.3 G/DL (ref 3.4–5)
ALP SERPL-CCNC: 101 U/L (ref 46–116)
ALT SERPL-CCNC: 8 U/L (ref 10–68)
ANION GAP SERPL CALC-SCNC: 13.5 MMOL/L (ref 8–16)
BASOPHILS NFR BLD AUTO: 0.4 % (ref 0–2)
BILIRUB SERPL-MCNC: 0.27 MG/DL (ref 0.2–1.3)
BUN SERPL-MCNC: 22 MG/DL (ref 7–18)
CALCIUM SERPL-MCNC: 8.3 MG/DL (ref 8.5–10.1)
CHLORIDE SERPL-SCNC: 115 MMOL/L (ref 98–107)
CO2 SERPL-SCNC: 19.3 MMOL/L (ref 21–32)
CREAT SERPL-MCNC: 1.6 MG/DL (ref 0.6–1.3)
EOSINOPHIL NFR BLD: 4.9 % (ref 0–7)
ERYTHROCYTE [DISTWIDTH] IN BLOOD BY AUTOMATED COUNT: 16 % (ref 11.5–14.5)
GLOBULIN SER-MCNC: 3.9 G/L
GLUCOSE SERPL-MCNC: 75 MG/DL (ref 74–106)
HCT VFR BLD CALC: 33.9 % (ref 36–48)
HGB BLD-MCNC: 11.1 G/DL (ref 12–16)
IMM GRANULOCYTES NFR BLD: 1.7 % (ref 0–5)
LYMPHOCYTES NFR BLD AUTO: 28.2 % (ref 15–50)
MCH RBC QN AUTO: 28.9 PG (ref 26–34)
MCHC RBC AUTO-ENTMCNC: 32.7 G/DL (ref 31–37)
MCV RBC: 88.3 FL (ref 80–100)
MONOCYTES NFR BLD: 12.5 % (ref 2–11)
NEUTROPHILS NFR BLD AUTO: 52.3 % (ref 40–80)
OSMOLALITY SERPL CALC.SUM OF ELEC: 288 MOSM/KG (ref 275–300)
PLATELET # BLD: 195 10X3/UL (ref 130–400)
PMV BLD AUTO: 9.7 FL (ref 7.4–10.4)
POTASSIUM SERPL-SCNC: 3.8 MMOL/L (ref 3.5–5.1)
PROT SERPL-MCNC: 5.2 G/DL (ref 6.4–8.2)
RBC # BLD AUTO: 3.84 10X6/UL (ref 4–5.4)
SODIUM SERPL-SCNC: 144 MMOL/L (ref 136–145)
WBC # BLD AUTO: 4.7 10X3/UL (ref 4.8–10.8)

## 2019-01-27 VITALS — DIASTOLIC BLOOD PRESSURE: 54 MMHG | SYSTOLIC BLOOD PRESSURE: 136 MMHG

## 2019-01-27 VITALS — DIASTOLIC BLOOD PRESSURE: 65 MMHG | SYSTOLIC BLOOD PRESSURE: 144 MMHG

## 2019-01-27 VITALS — SYSTOLIC BLOOD PRESSURE: 145 MMHG | DIASTOLIC BLOOD PRESSURE: 61 MMHG

## 2019-01-27 VITALS — SYSTOLIC BLOOD PRESSURE: 151 MMHG | DIASTOLIC BLOOD PRESSURE: 60 MMHG

## 2019-01-27 VITALS — DIASTOLIC BLOOD PRESSURE: 60 MMHG | SYSTOLIC BLOOD PRESSURE: 155 MMHG

## 2019-01-27 VITALS — DIASTOLIC BLOOD PRESSURE: 55 MMHG | SYSTOLIC BLOOD PRESSURE: 156 MMHG

## 2019-01-27 LAB
ALBUMIN SERPL-MCNC: 1.2 G/DL (ref 3.4–5)
ALP SERPL-CCNC: 99 U/L (ref 46–116)
ALT SERPL-CCNC: 9 U/L (ref 10–68)
ANION GAP SERPL CALC-SCNC: 14.9 MMOL/L (ref 8–16)
BASOPHILS NFR BLD AUTO: 0.9 % (ref 0–2)
BILIRUB SERPL-MCNC: 0.23 MG/DL (ref 0.2–1.3)
BUN SERPL-MCNC: 18 MG/DL (ref 7–18)
CALCIUM SERPL-MCNC: 8.1 MG/DL (ref 8.5–10.1)
CHLORIDE SERPL-SCNC: 117 MMOL/L (ref 98–107)
CO2 SERPL-SCNC: 18.1 MMOL/L (ref 21–32)
CREAT SERPL-MCNC: 1.5 MG/DL (ref 0.6–1.3)
EOSINOPHIL NFR BLD: 2.8 % (ref 0–7)
ERYTHROCYTE [DISTWIDTH] IN BLOOD BY AUTOMATED COUNT: 15.4 % (ref 11.5–14.5)
GLOBULIN SER-MCNC: 3.4 G/L
GLUCOSE SERPL-MCNC: 74 MG/DL (ref 74–106)
HCT VFR BLD CALC: 31.4 % (ref 36–48)
HGB BLD-MCNC: 10.5 G/DL (ref 12–16)
IMM GRANULOCYTES NFR BLD: 1.7 % (ref 0–5)
LYMPHOCYTES NFR BLD AUTO: 25.9 % (ref 15–50)
MCH RBC QN AUTO: 28.6 PG (ref 26–34)
MCHC RBC AUTO-ENTMCNC: 33.4 G/DL (ref 31–37)
MCV RBC: 85.6 FL (ref 80–100)
MONOCYTES NFR BLD: 11.2 % (ref 2–11)
NEUTROPHILS NFR BLD AUTO: 57.5 % (ref 40–80)
OSMOLALITY SERPL CALC.SUM OF ELEC: 291 MOSM/KG (ref 275–300)
PLATELET # BLD: 188 10X3/UL (ref 130–400)
PMV BLD AUTO: 10.3 FL (ref 7.4–10.4)
POTASSIUM SERPL-SCNC: 4 MMOL/L (ref 3.5–5.1)
PROT SERPL-MCNC: 4.6 G/DL (ref 6.4–8.2)
RBC # BLD AUTO: 3.67 10X6/UL (ref 4–5.4)
SODIUM SERPL-SCNC: 146 MMOL/L (ref 136–145)
WBC # BLD AUTO: 5.4 10X3/UL (ref 4.8–10.8)

## 2019-01-27 NOTE — MORECARE
CASE MANAGEMENT DISCHARGE SUMMARY
 
 
PATIENT: JOSEFA ELWIS                 UNIT: [id number]
ACCOUNT#: [id number]                       ADM DATE: 01/10/19
AGE: 73     : [birth year]  SEX: F            ROOM/BED: D.2215    
AUTHOR: JANEDOC                             PHYSICIAN:                               
 
REFERRING PHYSICIAN: MARLON BOO MD                
DATE OF SERVICE: 19
Discharge Plan
 
 
Patient Name: JOSEFA LEWIS
Facility: [facility]
Encounter #: [id number]
Medical Record #: [id number]
: 1945
Planned Disposition: 
Anticipated Discharge Date: 19
 
Discharge Date: 
Expected LOS: 4
Initial Reviewer: [id number]
Initial Review Date: 01/10/2019
Generated: 19  11:49 am 
Comments
 
DCP- Discharge Planning
 
Updated by [id number]: Laury Mustafa on 19   9:44 am CT
Patient Name: JOSEFA LEWIS                                     
Admission Status: ER   
Accout number: [id number]                              
Admission Date: 01-   
: 1945                                                        
Admission Diagnosis:SEPSIS, UNSPECIFIED ORGANISM   
Attending: MARLON BOO                                                
Current LOS:  17   
  
Anticipated DC Date: 2019   
Planned Disposition:    
Primary Insurance: MEDICARE A & B   
  
  
Discharge Planning Comments: CM SPOKE WITH PATIENT'S NURSE AND THE PATIENT IS 
NOW VOMITING. THE DOCTORS SAID TO HOLD OFF ON DC FOR TODAY AND WILL RE 
EVALUATE TOMORROW. CM CALLED [facility] [phone] AND INFORMED THEM THE 
PATIENT WILL NOT BE COMING TODAY. CM WILL FOLLOW AND ASSIST AS NEEDED WITH DC 
PLANNING/NEEDS.   
 
  
  
  
  
  
  
: Laury Mustafa
DCP- Discharge Planning
 
Updated by [email or web address]: Laury Mustafa on 19   9:31 am CT
Patient Name: JOSEFA LEWIS                                     
Admission Status: ER   
Accout number: [id number]                              
Admission Date: 01-   
: 1945                                                        
Admission Diagnosis:SEPSIS, UNSPECIFIED ORGANISM   
Attending: MARLON BOO                                                
Current LOS:  17   
  
Anticipated DC Date: 2019   
Planned Disposition:    
Primary Insurance: MEDICARE A & B   
  
  
Discharge Planning Comments: CM SPOKE WITH ELHAM STEWART AT [facility] 
[phone]. SHE STATED NURSE CAN CALL REPORT TO HER AND THEY WILL ACCEPT 
PATIENT TODAY. PATIENT TO GO BY EMS AS STATED BY NICOLLE, , NOTES.  
  
  
  
  
  
  
: Laury Mustafa
DCP- Discharge Planning
 
Updated by [id number]: Ro Zarate on 19   2:20 pm CT
ESHA WITH [facility] STATED THAT IF PATIENT IS STILL STABLE ON 
 THEY WILL ACCEPT PATIENT, SHE WILL NEED TO TRANSFER VIA EMS. AVIS AG AND DR MONTALVO AWARE. CM WILL CONTINUE TO FOLLOW AND ASSIST WITH DC 
PLANNING
DCP- Discharge Planning
 
Updated by [id number]: Ro Zarate on 19  12:51 pm CT
SENT CLINICAL UPDATES TO [facility], TO SEE IF THEY WILL ACCEPT PATIENT. CM WILL 
CONTINUE TO FOLLOW WITH DC PLANNING   
PATIENT'S FRIEND PHIL LEONARDO [phone]
DCP- Discharge Planning
 
Updated by [id number]: Ro Zarate on 19  11:51 am CT
PATIENT WITH AN ELEVATED WBC, DISCHARGE CANCELLED
DCP- Discharge Planning
 
 
Updated by [id number]: Ro Zarate on 19   1:28 pm CT
blood still infusing, [facility] esha [facility] [place] [facility] [facility], it is too 
late to transfer at 1730, they will accept patient tomorrow. Patient will 
have to go via EMS
DCP- Discharge Planning
 
Updated by [id number]: Ro Zarate on 19  11:07 am CT
SPOKE WITH ESHA AT [facility], UPDATED HER THAT THE PATIENT WAS GETTING BLOOD 
TODAY, DEPENDING ON WHAT TIME THE BLOOD IS COMPLETED THE PATIENT MAY DC.  
ESHA WILL CALL ME BACK AND LET ME KNOW IF THERE IS A TIME THAT IS TOO LATE. 
CM WILL CONTINUE TO FOLLOW AND ASSIST WITH DC PLANNING
DCP- Discharge Planning
 
Updated by [id number]: Eunice An on 19   9:58 am CT
APN PRESENTED TO CM WITH QUESTIONS  REGARDING DISCHARGE TO ACUTE REHAB TODAY. 
PATIENT WITH QUESTIONS REGARDING DISCHARGE. PATIENT IS TEARFUL. STATES SHE 
WAS TOLD SHE WOULD BE LEAVING MONDAY OR TUESDAY. HAD QUESTIONS REGARDING HER 
REMAINING INSURANCE DAYS.  
CM CANNOT OBTAIN INFORMATION REGARDING INSURANCE DAYS OVER THE WEEKEND.   
TC TO [facility] ACUTE REHAB.  
SPOKE WITH PHIL BECK. SHE STATES [facility] HAS THE PATIENT SCHEDULED FOR 
MONDAY.  
CM ADVISED THE PATIENT. SPOKE WITH THE PRIMARY NURSE AND APN.  
WILL ASK WEEKDAY CM TO CALL BILLING ON MONDAY TO ADDRESS QUESTIONS REGARDING 
REMAINING HOSPITAL DAYS .
DCP- Discharge Planning
 
Updated by [id number]: Ro Zarate on 19   9:32 am CT
UPDATED CLINICAL SENT TO [facility], I SPOKE WITH ESHA.  SHE DID 
SAY THAT THEY WOULD ACCEPT HER WHEN SHE WAS STABLE TO COME. THEY ONLY ACCEPT 
ON  IF SHE DISCHARGES OVER THE WEEKEND.  I LET KIMBERLYBEBETOGABO SANCHEZ AWARE OF 
THIS. CM WILL CONTINUE TO FOLLOW AND ASSIST WITH DC PLANNING
DCP- Discharge Planning
 
Updated by [email or web address]: Ro Tessa on 1/15/19   2:48 pm CT
REFERRAL SENT TO [facility], I SPOKE WITH [facility]  
FAX # [phone]  
[facility] # [phone]
DCP- Discharge Planning
 
Updated by [id number]: Ro Crawfordken on 1/15/19   2:36 pm CT
MET WITH PATIENT SHE WOULD LIKE TO GO TO [facility] TO THEIR INPATIENT 
FACILITY
DCP- Discharge Planning
 
Updated by [id number]: Marcella Canales on 1/10/19   3:30 pm CT
Patient Name: JOSEFA LEWIS                                    
Admission Status: ER  
Accout number: [id number]                             
Admission Date: 01-  
: 1945                                                       
 
Admission Diagnosis:  
Attending: MARLON BOO                                               
Current LOS:  1  
 
Anticipated DC Date: 2019  
Planned Disposition:   
Primary Insurance: MEDICARE A & B  
 
 
Discharge Planning Comments:  
 
CM met with patient to complete initial dc planning assessment.  CM educated 
patient on the CM role and verbal consent given by patient to complete 
assessment.   Patient lives at home with her spouse but was currently at [facility] for therapy. Patient reports she has been in and 
out of the hopstial/rehab since April with infected knee.  At discharge 
patient plans to return to [facility] to complete her rehab and feels this 
is a safe discharge.  Patient reports she is not able to ambulate due to her 
knee and is wheelchair bound. Patient denied known discharge needs at this 
time. CM will continue to follow and will assist as needed with dc 
plans/needs.   
 
: Marcella Canales RN, [facility]
 DCPIA - Discharge Planning Initial Assessment
 
Updated by [id number]: Marcella Canales on 1/10/19   4:27 pm
*  Is the patient Alert and Oriented?
Yes
*  How many steps to enter\exit or inside your home? none *  PCP Dr. Primo Santoro * 
Pharmacy
[facility]
*  Preadmission Environment
Skilled Nursing Facility
*  Facility Name
[facility]
*  ADLs
Partial Dependent
*  Partial ADLs (Assistance needed)
Ambulation
Bathing
Dressing
Medication Management
Transfers
*  Equipment
Wheelchair
*  List name and contact numbers for known caregivers / representatives who 
currently or will assist patient after discharge:
Kashif Lewis - [facility] - [phone]
*  Verbal permission to speak to the caregivers and representatives has been 
obtained from the patient.
Yes
*  Community resources currently utilized
 
None
*  Additional services required to return to the preadmission environment?
No
*  Can the patient safely return to the preadmission environment?
Yes
*  Has this patient been hospitalized within the prior 30 days at any 
hospital?
Yes
 
 
 
 
 
 
 
Last DP export: 19   9:35 a
Patient Name: JOSEFA LEWIS
Encounter #: [id number]
Page [id number]
 
 
 
 
 
Electronically Signed by KJ LARA on 19 at 1049
 
 
 
 
 
 
**All edits/amendments must be made on the electronic document**
 
DICTATION DATE: 19     : CONNIE  19 1048     
RPT#: [id number]                                DC DATE:        
                                               STATUS: ADM IN  
[facility]
[address] [place] [address]
***END OF REPORT***

## 2019-01-27 NOTE — MORECARE
CASE MANAGEMENT DISCHARGE SUMMARY
 
 
PATIENT: JOSEFA LEWIS                 UNIT: [id number]
ACCOUNT#: [id number]                       ADM DATE: 01/10/19
AGE: 73     : [birth year]  SEX: F            ROOM/BED: D.2215    
AUTHOR: JANEDOC                             PHYSICIAN:                               
 
REFERRING PHYSICIAN: MARLON BOO MD                
DATE OF SERVICE: 19
Discharge Plan
 
 
Patient Name: JOSEFA LEWIS
Facility: [facility]
Encounter #: [id number]
Medical Record #: [id number]
: 1945
Planned Disposition: 
Anticipated Discharge Date: 19
 
Discharge Date: 
Expected LOS: 4
Initial Reviewer: [id number]
Initial Review Date: 01/10/2019
Generated: 19  11:35 am 
Comments
 
DCP- Discharge Planning
 
Updated by [id number]: Laury Mustafa on 19   9:31 am CT
Patient Name: JOSEFA LEWIS                                     
Admission Status: ER   
Accout number: [id number]                              
Admission Date: 01-   
: 1945                                                        
Admission Diagnosis:SEPSIS, UNSPECIFIED ORGANISM   
Attending: MARLON BOO                                                
Current LOS:  17   
  
Anticipated DC Date: 2019   
Planned Disposition:    
Primary Insurance: MEDICARE A & B   
  
  
Discharge Planning Comments: ZULLY SPOKE WITH ELHAM STEWART AT [facility] 
[phone]. SHE STATED NURSE CAN CALL REPORT TO HER AND THEY WILL ACCEPT 
PATIENT TODAY. PATIENT TO GO BY EMS AS STATED BY NICOLLE, , NOTES.  
  
  
 
  
  
  
  
: Laury Mustafa
DCP- Discharge Planning
 
Updated by [id number]: Ro Zarate on 19   2:20 pm CT
ESHA WITH [facility] STATED THAT IF PATIENT IS STILL STABLE ON 
 THEY WILL ACCEPT PATIENT, SHE WILL NEED TO TRANSFER VIA EMS. [facility] AG AND DR MONTALVO AWARE. CM WILL CONTINUE TO FOLLOW AND ASSIST WITH DC 
PLANNING
DCP- Discharge Planning
 
Updated by [id number]: Ro Zarate on 19  12:51 pm CT
SENT CLINICAL UPDATES TO [facility], TO SEE IF THEY WILL ACCEPT PATIENT. CM WILL 
CONTINUE TO FOLLOW WITH DC PLANNING   
PATIENT'S FRIEND PHIL [facility] [phone]
DCP- Discharge Planning
 
Updated by [id number]: Ro Zarate on 19  11:51 am CT
PATIENT WITH AN ELEVATED WBC, DISCHARGE CANCELLED
DCP- Discharge Planning
 
Updated by [id number]: Ro Zarate on 19   1:28 pm CT
blood still infusing, spoke with [facility] at [facility], it is too 
late to transfer at 1730, they will accept patient tomorrow. Patient will 
have to go via EMS
DCP- Discharge Planning
 
Updated by [id number]: Ro Zarate on 19  11:07 am CT
SPOKE WITH ESHA AT [facility], UPDATED HER THAT THE PATIENT WAS GETTING BLOOD 
TODAY, DEPENDING ON WHAT TIME THE BLOOD IS COMPLETED THE PATIENT MAY DC.  
ESHA WILL CALL ME BACK AND LET ME KNOW IF THERE IS A TIME THAT IS TOO LATE. 
CM WILL CONTINUE TO FOLLOW AND ASSIST WITH DC PLANNING
DCP- Discharge Planning
 
Updated by [id number]: Eunice An on 19   9:58 am CT
APN PRESENTED TO CM WITH QUESTIONS  REGARDING DISCHARGE TO ACUTE REHAB TODAY. 
PATIENT WITH QUESTIONS REGARDING DISCHARGE. PATIENT IS TEARFUL. STATES SHE 
WAS TOLD SHE WOULD BE LEAVING MONDAY OR TUESDAY. HAD QUESTIONS REGARDING HER 
REMAINING INSURANCE DAYS.  
CM CANNOT OBTAIN INFORMATION REGARDING INSURANCE DAYS OVER THE WEEKEND.   
TC TO [facility] ACUTE REHAB.  
SPOKE WITH PHIL BECK. SHE STATES [facility] HAS THE PATIENT SCHEDULED FOR 
MONDAY.  
CM ADVISED THE PATIENT. SPOKE WITH THE PRIMARY NURSE AND APN.  
WILL ASK WEEKDAY CM TO CALL BILLING ON MONDAY TO ADDRESS QUESTIONS REGARDING 
REMAINING HOSPITAL DAYS .
DCP- Discharge Planning
 
Updated by [email or web address]: Ro Zarate on 19   9:32 am CT
 
UPDATED CLINICAL SENT TO [facility], I SPOKE WITH ESHA.  SHE DID 
SAY THAT THEY WOULD ACCEPT HER WHEN SHE WAS STABLE TO COME. THEY ONLY ACCEPT 
ON  IF SHE DISCHARGES OVER THE WEEKEND.  I LET ROYCE SANCHEZ AWARE OF 
THIS. CM WILL CONTINUE TO FOLLOW AND ASSIST WITH DC PLANNING
DCP- Discharge Planning
 
Updated by [id number]: Ro Zarate on 1/15/19   2:48 pm CT
REFERRAL SENT TO [facility], I SPOKE WITH [facility]  
FAX # [phone]  
[facility] # [phone]
DCP- Discharge Planning
 
Updated by [id number]: Ro Zarate on 1/15/19   2:36 pm CT
MET WITH PATIENT SHE WOULD LIKE TO GO TO [facility] TO THEIR INPATIENT 
FACILITY
DCP- Discharge Planning
 
Updated by [id number]: Marcella Canales on 1/10/19   3:30 pm CT
Patient Name: JOSEFA LEWIS                                    
Admission Status: ER  
Accout number: [id number]                             
Admission Date: 01-  
: 1945                                                       
Admission Diagnosis:  
Attending: MARLON BOO                                               
Current LOS:  1  
 
Anticipated DC Date: 2019  
Planned Disposition:   
Primary Insurance: MEDICARE A & B  
 
 
Discharge Planning Comments:  
 
CM met with patient to complete initial dc planning assessment.  CM educated 
patient on the CM role and verbal consent given by patient to complete 
assessment.   Patient lives at home with her spouse but was currently at [facility] for therapy. Patient reports she has been in and 
out of the hopstial/rehab since April with infected knee.  At discharge 
patient plans to return to [facility] to complete her rehab and feels this 
is a safe discharge.  Patient reports she is not able to ambulate due to her 
knee and is wheelchair bound. Patient denied known discharge needs at this 
time. CM will continue to follow and will assist as needed with dc 
plans/needs.   
 
: Marcella Canales RN, [facility]
 DCPIA - Discharge Planning Initial Assessment
 
Updated by [id number]: Marcella Canales on 1/10/19   4:27 pm
*  Is the patient Alert and Oriented?
Yes
*  How many steps to enter\exit or inside your home? none *  PCP Dr. Primo Santoro * 
 
Pharmacy
[facility]
*  Preadmission Environment
Skilled Nursing Facility
*  Facility Name
[facility]
*  ADLs
Partial Dependent
*  Partial ADLs (Assistance needed)
Ambulation
Bathing
Dressing
Medication Management
Transfers
*  Equipment
Wheelchair
*  List name and contact numbers for known caregivers / representatives who 
currently or will assist patient after discharge:
Kashif Lewis - spouse - [phone]
*  Verbal permission to speak to the caregivers and representatives has been 
obtained from the patient.
Yes
*  Community resources currently utilized
None
*  Additional services required to return to the preadmission environment?
No
*  Can the patient safely return to the preadmission environment?
Yes
*  Has this patient been hospitalized within the prior 30 days at any 
hospital?
Yes
 
 
 
 
 
 
 
Last DP export: 19   2:23 p
Patient Name: JOSEFA LEWIS
 
Encounter #: [id number]
Page [id number]
 
 
 
 
 
Electronically Signed by KJ LARA on 19 at 1035
 
 
 
 
 
 
**All edits/amendments must be made on the electronic document**
 
DICTATION DATE: 19 103     : CONNIE  19 103     
RPT#: [id number]                                DC DATE:        
                                               STATUS: ADM IN  
[facility]
 [place] [address]
***END OF REPORT***

## 2019-01-28 VITALS — SYSTOLIC BLOOD PRESSURE: 162 MMHG | DIASTOLIC BLOOD PRESSURE: 60 MMHG

## 2019-01-28 VITALS — SYSTOLIC BLOOD PRESSURE: 181 MMHG | DIASTOLIC BLOOD PRESSURE: 71 MMHG

## 2019-01-28 VITALS — SYSTOLIC BLOOD PRESSURE: 159 MMHG | DIASTOLIC BLOOD PRESSURE: 62 MMHG

## 2019-01-28 VITALS — SYSTOLIC BLOOD PRESSURE: 159 MMHG | DIASTOLIC BLOOD PRESSURE: 60 MMHG

## 2019-01-28 VITALS — DIASTOLIC BLOOD PRESSURE: 62 MMHG | SYSTOLIC BLOOD PRESSURE: 157 MMHG

## 2019-01-28 LAB
ALBUMIN SERPL-MCNC: 1.4 G/DL (ref 3.4–5)
ALP SERPL-CCNC: 111 U/L (ref 46–116)
ALT SERPL-CCNC: 9 U/L (ref 10–68)
ANION GAP SERPL CALC-SCNC: 18.3 MMOL/L (ref 8–16)
BASOPHILS NFR BLD AUTO: 1 % (ref 0–2)
BILIRUB SERPL-MCNC: 0.34 MG/DL (ref 0.2–1.3)
BUN SERPL-MCNC: 18 MG/DL (ref 7–18)
CALCIUM SERPL-MCNC: 8.3 MG/DL (ref 8.5–10.1)
CHLORIDE SERPL-SCNC: 113 MMOL/L (ref 98–107)
CO2 SERPL-SCNC: 16.1 MMOL/L (ref 21–32)
CREAT SERPL-MCNC: 1.5 MG/DL (ref 0.6–1.3)
EOSINOPHIL NFR BLD: 5 % (ref 0–7)
ERYTHROCYTE [DISTWIDTH] IN BLOOD BY AUTOMATED COUNT: 15.5 % (ref 11.5–14.5)
GLOBULIN SER-MCNC: 4.2 G/L
GLUCOSE SERPL-MCNC: 81 MG/DL (ref 74–106)
HCT VFR BLD CALC: 33.1 % (ref 36–48)
HGB BLD-MCNC: 11.1 G/DL (ref 12–16)
LYMPHOCYTES NFR BLD AUTO: 22 % (ref 15–50)
MCH RBC QN AUTO: 28.8 PG (ref 26–34)
MCHC RBC AUTO-ENTMCNC: 33.5 G/DL (ref 31–37)
MCV RBC: 86 FL (ref 80–100)
MONOCYTES NFR BLD: 13 % (ref 2–11)
NEUTROPHILS NFR BLD AUTO: 54 % (ref 40–80)
OSMOLALITY SERPL CALC.SUM OF ELEC: 287 MOSM/KG (ref 275–300)
PLATELET # BLD EST: NORMAL 10*3/UL
PLATELET # BLD: 224 10X3/UL (ref 130–400)
PMV BLD AUTO: 9.7 FL (ref 7.4–10.4)
POTASSIUM SERPL-SCNC: 3.4 MMOL/L (ref 3.5–5.1)
PROT SERPL-MCNC: 5.6 G/DL (ref 6.4–8.2)
RBC # BLD AUTO: 3.85 10X6/UL (ref 4–5.4)
SODIUM SERPL-SCNC: 144 MMOL/L (ref 136–145)
WBC # BLD AUTO: 4.7 10X3/UL (ref 4.8–10.8)

## 2019-01-29 VITALS — DIASTOLIC BLOOD PRESSURE: 60 MMHG | SYSTOLIC BLOOD PRESSURE: 166 MMHG

## 2019-01-29 VITALS — SYSTOLIC BLOOD PRESSURE: 154 MMHG | DIASTOLIC BLOOD PRESSURE: 62 MMHG

## 2019-01-29 VITALS — DIASTOLIC BLOOD PRESSURE: 62 MMHG | SYSTOLIC BLOOD PRESSURE: 154 MMHG

## 2019-01-29 VITALS — SYSTOLIC BLOOD PRESSURE: 134 MMHG | DIASTOLIC BLOOD PRESSURE: 59 MMHG

## 2019-01-29 VITALS — SYSTOLIC BLOOD PRESSURE: 167 MMHG | DIASTOLIC BLOOD PRESSURE: 54 MMHG

## 2019-01-29 VITALS — SYSTOLIC BLOOD PRESSURE: 156 MMHG | DIASTOLIC BLOOD PRESSURE: 57 MMHG

## 2019-01-29 VITALS — SYSTOLIC BLOOD PRESSURE: 164 MMHG | DIASTOLIC BLOOD PRESSURE: 67 MMHG

## 2019-01-29 LAB
ALBUMIN SERPL-MCNC: 1.4 G/DL (ref 3.4–5)
ALP SERPL-CCNC: 112 U/L (ref 46–116)
ALT SERPL-CCNC: 9 U/L (ref 10–68)
AMYLASE SERPL-CCNC: 18 U/L (ref 25–115)
ANION GAP SERPL CALC-SCNC: 15.8 MMOL/L (ref 8–16)
BASOPHILS NFR BLD AUTO: 0.6 % (ref 0–2)
BILIRUB SERPL-MCNC: 0.34 MG/DL (ref 0.2–1.3)
BUN SERPL-MCNC: 19 MG/DL (ref 7–18)
CALCIUM SERPL-MCNC: 8.2 MG/DL (ref 8.5–10.1)
CHLORIDE SERPL-SCNC: 113 MMOL/L (ref 98–107)
CO2 SERPL-SCNC: 18.7 MMOL/L (ref 21–32)
CREAT SERPL-MCNC: 1.5 MG/DL (ref 0.6–1.3)
EOSINOPHIL NFR BLD: 2.9 % (ref 0–7)
ERYTHROCYTE [DISTWIDTH] IN BLOOD BY AUTOMATED COUNT: 15.5 % (ref 11.5–14.5)
GLOBULIN SER-MCNC: 4.1 G/L
GLUCOSE SERPL-MCNC: 76 MG/DL (ref 74–106)
HCT VFR BLD CALC: 32.7 % (ref 36–48)
HGB BLD-MCNC: 10.8 G/DL (ref 12–16)
IMM GRANULOCYTES NFR BLD: 2.3 % (ref 0–5)
LIPASE SERPL-CCNC: 119 U/L (ref 73–393)
LYMPHOCYTES NFR BLD AUTO: 32.2 % (ref 15–50)
MCH RBC QN AUTO: 28.6 PG (ref 26–34)
MCHC RBC AUTO-ENTMCNC: 33 G/DL (ref 31–37)
MCV RBC: 86.5 FL (ref 80–100)
MONOCYTES NFR BLD: 13.4 % (ref 2–11)
NEUTROPHILS NFR BLD AUTO: 48.6 % (ref 40–80)
OSMOLALITY SERPL CALC.SUM OF ELEC: 287 MOSM/KG (ref 275–300)
PLATELET # BLD: 231 10X3/UL (ref 130–400)
PMV BLD AUTO: 9.8 FL (ref 7.4–10.4)
POTASSIUM SERPL-SCNC: 3.5 MMOL/L (ref 3.5–5.1)
PROT SERPL-MCNC: 5.5 G/DL (ref 6.4–8.2)
RBC # BLD AUTO: 3.78 10X6/UL (ref 4–5.4)
SODIUM SERPL-SCNC: 144 MMOL/L (ref 136–145)
WBC # BLD AUTO: 5.2 10X3/UL (ref 4.8–10.8)

## 2019-01-29 PROCEDURE — 0DD68ZX EXTRACTION OF STOMACH, VIA NATURAL OR ARTIFICIAL OPENING ENDOSCOPIC, DIAGNOSTIC: ICD-10-PCS | Performed by: INTERNAL MEDICINE

## 2019-01-30 VITALS — SYSTOLIC BLOOD PRESSURE: 147 MMHG | DIASTOLIC BLOOD PRESSURE: 101 MMHG

## 2019-01-30 VITALS — DIASTOLIC BLOOD PRESSURE: 61 MMHG | SYSTOLIC BLOOD PRESSURE: 131 MMHG

## 2019-01-30 VITALS — SYSTOLIC BLOOD PRESSURE: 147 MMHG | DIASTOLIC BLOOD PRESSURE: 61 MMHG

## 2019-01-30 VITALS — SYSTOLIC BLOOD PRESSURE: 159 MMHG | DIASTOLIC BLOOD PRESSURE: 66 MMHG

## 2019-01-30 VITALS — SYSTOLIC BLOOD PRESSURE: 163 MMHG | DIASTOLIC BLOOD PRESSURE: 65 MMHG

## 2019-01-30 LAB
ALBUMIN SERPL-MCNC: 1.3 G/DL (ref 3.4–5)
ALP SERPL-CCNC: 111 U/L (ref 46–116)
ALT SERPL-CCNC: 10 U/L (ref 10–68)
ANION GAP SERPL CALC-SCNC: 13.4 MMOL/L (ref 8–16)
BASOPHILS NFR BLD AUTO: 0.6 % (ref 0–2)
BILIRUB SERPL-MCNC: 0.31 MG/DL (ref 0.2–1.3)
BUN SERPL-MCNC: 17 MG/DL (ref 7–18)
CALCIUM SERPL-MCNC: 8 MG/DL (ref 8.5–10.1)
CHLORIDE SERPL-SCNC: 117 MMOL/L (ref 98–107)
CO2 SERPL-SCNC: 19.1 MMOL/L (ref 21–32)
CREAT SERPL-MCNC: 1.4 MG/DL (ref 0.6–1.3)
EOSINOPHIL NFR BLD: 2.4 % (ref 0–7)
ERYTHROCYTE [DISTWIDTH] IN BLOOD BY AUTOMATED COUNT: 15.8 % (ref 11.5–14.5)
GLOBULIN SER-MCNC: 4 G/L
GLUCOSE SERPL-MCNC: 75 MG/DL (ref 74–106)
HCT VFR BLD CALC: 32.3 % (ref 36–48)
HGB BLD-MCNC: 10.6 G/DL (ref 12–16)
IMM GRANULOCYTES NFR BLD: 3 % (ref 0–5)
LYMPHOCYTES NFR BLD AUTO: 26.8 % (ref 15–50)
MCH RBC QN AUTO: 28.4 PG (ref 26–34)
MCHC RBC AUTO-ENTMCNC: 32.8 G/DL (ref 31–37)
MCV RBC: 86.6 FL (ref 80–100)
MONOCYTES NFR BLD: 14.9 % (ref 2–11)
NEUTROPHILS NFR BLD AUTO: 52.3 % (ref 40–80)
OSMOLALITY SERPL CALC.SUM OF ELEC: 291 MOSM/KG (ref 275–300)
PLATELET # BLD: 224 10X3/UL (ref 130–400)
PMV BLD AUTO: 9.9 FL (ref 7.4–10.4)
POTASSIUM SERPL-SCNC: 3.5 MMOL/L (ref 3.5–5.1)
PROT SERPL-MCNC: 5.3 G/DL (ref 6.4–8.2)
RBC # BLD AUTO: 3.73 10X6/UL (ref 4–5.4)
SODIUM SERPL-SCNC: 146 MMOL/L (ref 136–145)
WBC # BLD AUTO: 6.7 10X3/UL (ref 4.8–10.8)

## 2019-01-31 VITALS — DIASTOLIC BLOOD PRESSURE: 66 MMHG | SYSTOLIC BLOOD PRESSURE: 156 MMHG

## 2019-01-31 VITALS — SYSTOLIC BLOOD PRESSURE: 129 MMHG | DIASTOLIC BLOOD PRESSURE: 50 MMHG

## 2019-01-31 VITALS — DIASTOLIC BLOOD PRESSURE: 54 MMHG | SYSTOLIC BLOOD PRESSURE: 132 MMHG

## 2019-01-31 VITALS — DIASTOLIC BLOOD PRESSURE: 80 MMHG | SYSTOLIC BLOOD PRESSURE: 140 MMHG

## 2019-01-31 VITALS — SYSTOLIC BLOOD PRESSURE: 179 MMHG | DIASTOLIC BLOOD PRESSURE: 66 MMHG

## 2019-01-31 VITALS — SYSTOLIC BLOOD PRESSURE: 146 MMHG | DIASTOLIC BLOOD PRESSURE: 61 MMHG

## 2019-01-31 LAB
ALBUMIN SERPL-MCNC: 1.2 G/DL (ref 3.4–5)
ALP SERPL-CCNC: 108 U/L (ref 46–116)
ALT SERPL-CCNC: 7 U/L (ref 10–68)
ANION GAP SERPL CALC-SCNC: 12.8 MMOL/L (ref 8–16)
BASOPHILS NFR BLD AUTO: 0.7 % (ref 0–2)
BILIRUB SERPL-MCNC: 0.27 MG/DL (ref 0.2–1.3)
BUN SERPL-MCNC: 16 MG/DL (ref 7–18)
CALCIUM SERPL-MCNC: 7.9 MG/DL (ref 8.5–10.1)
CHLORIDE SERPL-SCNC: 117 MMOL/L (ref 98–107)
CO2 SERPL-SCNC: 17.5 MMOL/L (ref 21–32)
CREAT SERPL-MCNC: 1.4 MG/DL (ref 0.6–1.3)
EOSINOPHIL NFR BLD: 3.4 % (ref 0–7)
ERYTHROCYTE [DISTWIDTH] IN BLOOD BY AUTOMATED COUNT: 15.7 % (ref 11.5–14.5)
GLOBULIN SER-MCNC: 3.7 G/L
GLUCOSE SERPL-MCNC: 83 MG/DL (ref 74–106)
HCT VFR BLD CALC: 28.8 % (ref 36–48)
HGB BLD-MCNC: 9.9 G/DL (ref 12–16)
IMM GRANULOCYTES NFR BLD: 3.6 % (ref 0–5)
LYMPHOCYTES NFR BLD AUTO: 26 % (ref 15–50)
MCH RBC QN AUTO: 29.7 PG (ref 26–34)
MCHC RBC AUTO-ENTMCNC: 34.4 G/DL (ref 31–37)
MCV RBC: 86.5 FL (ref 80–100)
MONOCYTES NFR BLD: 16.8 % (ref 2–11)
NEUTROPHILS NFR BLD AUTO: 49.5 % (ref 40–80)
OSMOLALITY SERPL CALC.SUM OF ELEC: 286 MOSM/KG (ref 275–300)
PLATELET # BLD: 202 10X3/UL (ref 130–400)
PMV BLD AUTO: 10.1 FL (ref 7.4–10.4)
POTASSIUM SERPL-SCNC: 3.3 MMOL/L (ref 3.5–5.1)
PROT SERPL-MCNC: 4.9 G/DL (ref 6.4–8.2)
RBC # BLD AUTO: 3.33 10X6/UL (ref 4–5.4)
SODIUM SERPL-SCNC: 144 MMOL/L (ref 136–145)
WBC # BLD AUTO: 5.9 10X3/UL (ref 4.8–10.8)

## 2019-02-01 VITALS — SYSTOLIC BLOOD PRESSURE: 146 MMHG | DIASTOLIC BLOOD PRESSURE: 60 MMHG

## 2019-02-01 VITALS — SYSTOLIC BLOOD PRESSURE: 120 MMHG | DIASTOLIC BLOOD PRESSURE: 49 MMHG

## 2019-02-01 VITALS — SYSTOLIC BLOOD PRESSURE: 158 MMHG | DIASTOLIC BLOOD PRESSURE: 76 MMHG

## 2019-02-01 VITALS — SYSTOLIC BLOOD PRESSURE: 143 MMHG | DIASTOLIC BLOOD PRESSURE: 56 MMHG

## 2019-02-01 VITALS — SYSTOLIC BLOOD PRESSURE: 156 MMHG | DIASTOLIC BLOOD PRESSURE: 64 MMHG

## 2019-02-01 NOTE — MORECARE
CASE MANAGEMENT DISCHARGE SUMMARY
 
 
PATIENT: JOSEFA LEWIS                 UNIT: [id number]
ACCOUNT#: [id number]                       ADM DATE: 01/10/19
AGE: 73     : 43  SEX: F            ROOM/BED: D.2215    
AUTHOR: JANE,DOC                             PHYSICIAN:                               
 
REFERRING PHYSICIAN: MARLON BOO MD                
DATE OF SERVICE: 19
Discharge Plan
 
 
Patient Name: JOSEFA LEWIS
Facility: [facility]:[place]
Encounter #: [id number]
Medical Record #: [id number]
: 1945
Planned Disposition: 
Anticipated Discharge Date: 19
 
Discharge Date: 
Expected LOS: 4
Initial Reviewer: [id number]
Initial Review Date: 01/10/2019
Generated: 19  11:18 am 
Comments
 
DCP- Discharge Planning
 
Updated by [id number]: Laury Mustafa on 19   9:44 am CT
Patient Name: JOSEFA LEWIS                                     
Admission Status: ER   
Accout number: [id number]                              
Admission Date: 01-   
: 1945                                                        
Admission Diagnosis:SEPSIS, UNSPECIFIED ORGANISM   
Attending: MARLON BOO                                                
Current LOS:  17   
  
Anticipated DC Date: 2019   
Planned Disposition:    
Primary Insurance: MEDICARE A & B   
  
  
Discharge Planning Comments: CM SPOKE WITH PATIENT'S NURSE AND THE PATIENT IS 
NOW VOMITING. THE DOCTORS SAID TO HOLD OFF ON DC FOR TODAY AND WILL RE 
EVALUATE TOMORROW. CM CALLED [facility] [phone] AND INFORMED THEM THE 
PATIENT WILL NOT BE COMING TODAY. CM WILL FOLLOW AND ASSIST AS NEEDED WITH DC 
PLANNING/NEEDS.   
 
  
  
  
  
  
  
: Laury Mustafa
DCP- Discharge Planning
 
Updated by [id number]: Laury Mustafa on 19   9:31 am CT
Patient Name: JOSEFA LEWIS                                     
Admission Status: ER   
Accout number: [id number]                              
Admission Date: 01-   
: 1945                                                        
Admission Diagnosis:SEPSIS, UNSPECIFIED ORGANISM   
Attending: MARLON BOO                                                
Current LOS:  17   
  
Anticipated DC Date: 2019   
Planned Disposition:    
Primary Insurance: MEDICARE A & B   
  
  
Discharge Planning Comments: ZULLY SPOKE WITH ELHAM STEWART AT [facility] 
[phone]. SHE STATED NURSE CAN CALL REPORT TO HER AND THEY WILL ACCEPT 
PATIENT TODAY. PATIENT TO GO BY EMS AS STATED BY NICOLLE, , NOTES.  
  
  
  
  
  
  
: Laury Mustafa
DCP- Discharge Planning
 
Updated by [id number]: Ro Zarate on 19   2:20 pm CT
ESHA WITH [facility] STATED THAT IF PATIENT IS STILL STABLE ON 
 THEY WILL ACCEPT PATIENT, SHE WILL NEED TO TRANSFER VIA EMS. [facility] AG AND DR MONTALVO AWARE. CM WILL CONTINUE TO FOLLOW AND ASSIST WITH DC 
PLANNING
DCP- Discharge Planning
 
Updated by [id number]: Ro Zarate on 19  12:51 pm CT
SENT CLINICAL UPDATES TO [facility], TO SEE IF THEY WILL ACCEPT PATIENT. CM WILL 
CONTINUE TO FOLLOW WITH DC PLANNING   
PATIENT'S FRIEND PHIL LEONARDO [phone]
DCP- Discharge Planning
 
Updated by [id number]: oR Zarate on 19  11:51 am CT
PATIENT WITH AN ELEVATED WBC, DISCHARGE CANCELLED
DCP- Discharge Planning
 
 
Updated by [id number]: Ro Zarate on 19   1:28 pm CT
blood still infusing, [facility] es[facility] [place] [facility], it is too 
late to transfer at 1730, they will accept patient tomorrow. Patient will 
have to go via EMS
DCP- Discharge Planning
 
Updated by [id number]: Ro Zarate on 19  11:07 am CT
SPOKE WITH ESHA AT [facility], UPDATED HER THAT THE PATIENT WAS GETTING BLOOD 
TODAY, DEPENDING ON WHAT TIME THE BLOOD IS COMPLETED THE PATIENT MAY DC.  
ESHA WILL CALL ME BACK AND LET ME KNOW IF THERE IS A TIME THAT IS TOO LATE. 
CM WILL CONTINUE TO FOLLOW AND ASSIST WITH DC PLANNING
DCP- Discharge Planning
 
Updated by [id number]: Eunice An on 19   9:58 am CT
APN PRESENTED TO CM WITH QUESTIONS  REGARDING DISCHARGE TO ACUTE REHAB TODAY. 
PATIENT WITH QUESTIONS REGARDING DISCHARGE. PATIENT IS TEARFUL. STATES SHE 
WAS TOLD SHE WOULD BE LEAVING MONDAY OR TUESDAY. HAD QUESTIONS REGARDING HER 
REMAINING INSURANCE DAYS.  
CM CANNOT OBTAIN INFORMATION REGARDING INSURANCE DAYS OVER THE WEEKEND.   
TC TO [facility] ACUTE REHAB.  
SPOKE WITH PHIL BECK. SHE STATES [facility] HAS THE PATIENT SCHEDULED FOR 
MONDAY.  
CM ADVISED THE PATIENT. SPOKE WITH THE PRIMARY NURSE AND APN.  
WILL ASK WEEKDAY CM TO CALL BILLING ON MONDAY TO ADDRESS QUESTIONS REGARDING 
REMAINING HOSPITAL DAYS .
DCP- Discharge Planning
 
Updated by [id number]: Ro Zarate on 19   9:32 am CT
UPDATED CLINICAL SENT TO [facility], I SPOKE WITH ESHA.  SHE DID 
SAY THAT THEY WOULD ACCEPT HER WHEN SHE WAS STABLE TO COME. THEY ONLY ACCEPT 
ON  IF SHE DISCHARGES OVER THE WEEKEND.  I LET KIMBERLYBEBETOGABO SANCHEZ AWARE OF 
THIS. CM WILL CONTINUE TO FOLLOW AND ASSIST WITH DC PLANNING
DCP- Discharge Planning
 
Updated by [email or web address]: Ro Tessa on 1/15/19   2:48 pm CT
REFERRAL SENT TO [facility], I SPOKE WITH [facility]  
FAX # [phone]  
[facility] # [phone]
DCP- Discharge Planning
 
Updated by [id number]: Ro Crawfordken on 1/15/19   2:36 pm CT
MET WITH PATIENT SHE WOULD LIKE TO GO TO [facility] TO THEIR INPATIENT 
FACILITY
DCP- Discharge Planning
 
Updated by [id number]: Marcella Canales on 1/10/19   3:30 pm CT
Patient Name: JOSEFA LEWIS                                    
Admission Status: ER  
Accout number: [id number]                             
Admission Date: 01-  
: 1945                                                       
 
Admission Diagnosis:  
Attending: MARLON BOO                                               
Current LOS:  1  
 
Anticipated DC Date: 2019  
Planned Disposition:   
Primary Insurance: MEDICARE A & B  
 
 
Discharge Planning Comments:  
 
CM met with patient to complete initial dc planning assessment.  CM educated 
patient on the CM role and verbal consent given by patient to complete 
assessment.   Patient lives at home with her spouse but was currently at [facility] for [facility]. Patient reports she has been in and 
out of the hopstial/rehab since April with infected knee.  At discharge 
patient plans to return to [facility] to complete her rehab and feels this 
is a safe discharge.  Patient reports she is not able to ambulate due to her 
knee and is wheelchair bound. Patient denied known discharge needs at this 
time. CM will continue to follow and will assist as needed with dc 
plans/needs.   
 
: Marcella Canales RN, [facility]
 DCPIA - Discharge Planning Initial Assessment
 
Updated by [id number]: Marcella Canales on 1/10/19   4:27 pm
*  Is the patient Alert and Oriented?
Yes
*  How many steps to enter\exit or inside your home? none *  PCP Dr. Primo Santoro * 
Pharmacy
[facility]
*  Preadmission Environment
Skilled Nursing Facility
*  Facility Name
[facility]
*  ADLs
Partial Dependent
*  Partial ADLs (Assistance needed)
Ambulation
Bathing
Dressing
Medication Management
Transfers
*  Equipment
Wheelchair
*  List name and contact numbers for known caregivers / representatives who 
currently or will assist patient after discharge:
Kashif Lewis - [facility] - [phone]
*  Verbal permission to speak to the caregivers and representatives has been 
obtained from the patient.
Yes
*  Community resources currently utilized
 
None
*  Additional services required to return to the preadmission environment?
No
*  Can the patient safely return to the preadmission environment?
Yes
*  Has this patient been hospitalized within the prior 30 days at any 
hospital?
Yes
 
 
 
 
 
 
 
Last DP export: 19   9:49 a
Patient Name: JOSEFA LEWIS
Encounter #: [id number]
Page [id number]
 
 
 
 
 
Electronically Signed by KJ LARA on 19 at 1018
 
 
 
 
 
 
**All edits/amendments must be made on the electronic document**
 
DICTATION DATE: 19 1018     : CONNIE  19 1018     
RPT#: [id number]                                DC DATE:        
                                               STATUS: ADM IN  
[facility]
[address] [place] [address]
***END OF REPORT***

## 2019-02-01 NOTE — [facility]
CASE MANAGEMENT DISCHARGE SUMMARY
 
 
PATIENT: JOSEFA LEWIS                 UNIT: [id number]
ACCOUNT#: [id number]                       ADM DATE: 01/10/19
AGE: 73     : 43  SEX: F            ROOM/BED: D.2215    
AUTHOR: JANE,DOC                             PHYSICIAN:                               
 
REFERRING PHYSICIAN: MARLON BOO MD                
DATE OF SERVICE: 19
Discharge Plan
 
 
Patient Name: JOSEFA LEWIS
Facility: [facility]
Encounter #: [id number]
Medical Record #: [id number]
: 1945
Planned Disposition: 
Anticipated Discharge Date: 19
 
Discharge Date: 
Expected LOS: 4
Initial Reviewer: [email or web address]
Initial Review Date: 01/10/2019
Generated: 19  11:27 am 
Comments
 
DCP- Discharge Planning
 
Updated by [id number]: Ro Zarate on 19   9:22 am CT
spoke with Abril at [facility] update given and faxed clinicals will 
wait for their acceptance
DCP- Discharge Planning
 
Updated by [id number]: Laury Mustafa on 19   9:44 am CT
Patient Name: JOSEFA LEWIS                                     
Admission Status: ER   
Accout number: [id number]                              
Admission Date: 01-   
: 1945                                                        
Admission Diagnosis:SEPSIS, UNSPECIFIED ORGANISM   
Attending: MARLON BOO                                                
Current LOS:  17   
  
Anticipated DC Date: 2019   
Planned Disposition:    
Primary Insurance: MEDICARE A & B   
  
  
 
Discharge Planning Comments: CM SPOKE WITH PATIENT'S NURSE AND THE PATIENT IS 
NOW VOMITING. THE DOCTORS SAID TO HOLD OFF ON DC FOR TODAY AND WILL RE 
EVALUATE TOMORROW. CM CALLED [facility] [phone] AND INFORMED THEM THE 
PATIENT WILL NOT BE COMING TODAY. CM WILL FOLLOW AND ASSIST AS NEEDED WITH DC 
PLANNING/NEEDS.   
  
  
  
  
  
  
: Laury Mustafa
DCP- Discharge Planning
 
Updated by [id number]: Laury Mustafa on 19   9:31 am CT
Patient Name: JOSEFA LEWIS                                     
Admission Status: ER   
Accout number: [id number]                              
Admission Date: 01-   
: 1945                                                        
Admission Diagnosis:SEPSIS, UNSPECIFIED ORGANISM   
Attending: MARLON BOO                                                
Current LOS:  17   
  
Anticipated DC Date: 2019   
Planned Disposition:    
Primary Insurance: MEDICARE A & B   
  
  
Discharge Planning Comments: CM SPOKE WITH PAT RN AT [facility] 
[phone]. SHE STATED NURSE CAN CALL REPORT TO HER AND THEY WILL ACCEPT 
PATIENT TODAY. PATIENT TO GO BY EMS AS STATED BY NICOLLE, , NOTES.  
  
  
  
  
  
  
: Laury Mustafa
DCP- Discharge Planning
 
Updated by [id number]: Ro Zarate on 19   2:20 pm CT
ABRIL WITH [facility] STATED THAT IF PATIENT IS STILL STABLE ON 
 THEY WILL ACCEPT PATIENT, SHE WILL NEED TO TRANSFER VIA EMS. AVIS AG AND DR MONTALVO AWARE. CM WILL CONTINUE TO FOLLOW AND ASSIST WITH DC 
PLANNING
DCP- Discharge Planning
 
Updated by [id number]: Ro Zarate on 19  12:51 pm CT
SENT CLINICAL UPDATES TO [facility], TO SEE IF THEY WILL ACCEPT PATIENT. CM WILL 
CONTINUE TO FOLLOW WITH DC PLANNING   
PATIENT'S FRIEND PHIL LEONARDO [phone]
 
DCP- Discharge Planning
 
Updated by [id number]: Ro Zarate on 19  11:51 am CT
PATIENT WITH AN ELEVATED WBC, DISCHARGE CANCELLED
DCP- Discharge Planning
 
Updated by [id number]: Ro Zarate on 19   1:28 pm CT
blood still infusing, spoke with abril at [facility], it is too 
late to transfer at 1730, they will accept patient tomorrow. Patient will 
have to go via EMS
DCP- Discharge Planning
 
Updated by [id number]: Ro Zarate on 19  11:07 am CT
SPOKE WITH ABRIL AT [facility], UPDATED HER THAT THE PATIENT WAS GETTING BLOOD 
TODAY, DEPENDING ON WHAT TIME THE BLOOD IS COMPLETED THE PATIENT MAY DC.  
ABRIL WILL CALL ME BACK AND LET ME KNOW IF THERE IS A TIME THAT IS TOO LATE. 
CM WILL CONTINUE TO FOLLOW AND ASSIST WITH DC PLANNING
DCP- Discharge Planning
 
Updated by [id number]: Eunice An on 19   9:58 am CT
ANCELMO PRESENTED TO CM WITH QUESTIONS  REGARDING DISCHARGE TO ACUTE REHAB TODAY. 
PATIENT WITH QUESTIONS REGARDING DISCHARGE. PATIENT IS TEARFUL. STATES SHE 
WAS TOLD SHE WOULD BE LEAVING MONDAY OR TUESDAY. HAD QUESTIONS REGARDING HER 
REMAINING INSURANCE DAYS.  
CM CANNOT OBTAIN INFORMATION REGARDING INSURANCE DAYS OVER THE WEEKEND.   
TC TO [facility] REH[facility].  
SPOKE WITH PHIL BECK. SHE STATES [facility] THE [facility]IENT SCHEDUL[facility] FOR 
MONDAY.  
CM ADVISED THE PATIENT. SPOKE WITH THE PRIMARY NURSE AND APN.  
WILL ASK WEEKDAY CM TO CALL BILLING ON MONDAY TO ADDRESS QUESTIONS REGARDING 
REMAINING HOSPITAL DAYS .
DCP- Discharge Planning
 
Updated by [id number]: Ro Zarate on 19   9:32 am CT
UPDATED CLINICAL SENT TO [facility], I SPOKE WITH ABRIL.  SHE DID 
SAY THAT THEY WOULD ACCEPT HER WHEN SHE WAS STABLE TO COME. THEY ONLY ACCEPT 
ON  IF SHE DISCHARGES OVER THE WEEKEND.  I LET ROYCE SANCHEZ AWARE OF 
THIS. CM WILL CONTINUE TO FOLLOW AND ASSIST WITH DC PLANNING
DCP- Discharge Planning
 
Updated by [id number]: Ro Zarate on 1/15/19   2:48 pm CT
[facility] I SPOKE WITH ABRIL  
FAX # [phone]  
[facility] # [phone]
DCP- Discharge Planning
 
Updated by [id number]: Ro Zarate on 1/15/19   2:36 pm CT
MET WITH PATIENT SHE WOULD LIKE TO GO TO [facility] TO THEIR INPATIENT 
FACILITY
DCP- Discharge Planning
 
Updated by [id number]: Marcellaciarra Canales on 1/10/19   3:30 pm CT
 
Patient Name: JOSEFA LEWIS                                    
Admission Status: ER  
Accout number: [id number]                             
Admission Date: 01-  
: 1945                                                       
Admission Diagnosis:  
Attending: MARLON BOO                                               
Current LOS:  1  
 
Anticipated DC Date: 2019  
Planned Disposition:   
Primary Insurance: MEDICARE A & B  
 
 
Discharge Planning Comments:  
 
CM met with patient to complete initial dc planning assessment.  CM educated 
patient on the CM role and verbal consent given by patient to complete 
assessment.   Patient lives at home with her spouse but was currently at [facility] for therapy. Patient reports she has been in and 
out of the hopstial/rehab since April with infected knee.  At discharge 
patient plans to return to [facility] to complete her rehab and feels this 
is a safe discharge.  Patient reports she is not able to ambulate due to her 
knee and is wheelchair bound. Patient denied known discharge needs at this 
time. CM will continue to follow and will assist as needed with dc 
plans/needs.   
 
: Marcella Canales RN, [facility]
 DCPIA - Discharge Planning Initial Assessment
 
Updated by [id number]: Marcella Canales on 1/10/19   4:27 pm
*  Is the patient Alert and Oriented?
Yes
*  How many steps to enter\exit or inside your home? none *  PCP Dr. Primo Santoro * 
Pharmacy
[facility]
*  Preadmission Environment
Skilled Nursing Facility
*  Facility Name
[facility]
*  ADLs
Partial Dependent
*  Partial ADLs (Assistance needed)
Ambulation
Bathing
Dressing
Medication Management
Transfers
*  Equipment
Wheelchair
*  List name and contact numbers for known caregivers / representatives who 
currently or will assist patient after discharge:
 
Kashif Lewis - [facility] - [phone]
*  Verbal permission to speak to the caregivers and representatives has been 
obtained from the patient.
Yes
*  Community resources currently utilized
None
*  Additional services required to return to the preadmission environment?
No
*  Can the patient safely return to the preadmission environment?
Yes
*  Has this patient been hospitalized within the prior 30 days at any 
hospital?
Yes
 
 
 
 
 
 
 
Last DP export: 19   9:18 am
Patient Name: JOSEFA LEWIS
Encounter #: [id number]
Page [id number]
 
 
 
 
 
Electronically Signed by KJ LARA on 19 at 1027
 
 
 
 
 
 
**All edits/amendments must be made on the electronic document**
 
DICTATION DATE: 19 1026     : CONNIE  19 1026     
RPT#: [id number]                                DC DATE:        
                                               STATUS: ADM IN  
[facility]
[address] [place] [address]
***END OF REPORT***

## 2019-02-01 NOTE — [facility]
CASE MANAGEMENT DISCHARGE SUMMARY
 
 
PATIENT: JOSEFA LEWIS                 UNIT: [id number]
ACCOUNT#: [id number]                       ADM DATE: 01/10/19
AGE: 73     : 43  SEX: F            ROOM/BED: D.2215    
AUTHOR: JANEDOC                             PHYSICIAN:                               
 
REFERRING PHYSICIAN: MARLON BOO MD                
DATE OF SERVICE: 19
Discharge Plan
 
 
Patient Name: JOSEFA LEWIS
Facility: [facility]:[place]
Encounter #: [id number]
Medical Record #: [id number]
: 1945
Planned Disposition: 
Anticipated Discharge Date: 19
 
Discharge Date: 
Expected LOS: 4
Initial Reviewer: [email or web address]
Initial Review Date: 01/10/2019
Generated: 19   4:48 pm 
Comments
 
DCP- Discharge Planning
 
Updated by [id number]: Daniela Zhou on 19   2:47 pm CT
Received a call from Abril from [facility]. She would like to have therapy 
notes sent over the weekend to see how she does and states she will touch 
base on Monday. CM will continue to follow and assist with discharge 
planning/needs.
DCP- Discharge Planning
 
Updated by [email or web address]: Ro Zarate on 19   2:27 pm CT
[facility] talking to patient, will possibility take on 
DCP- Discharge Planning
 
Updated by [id number]: Ro Zarate on 19   9:22 am CT
spoke with Abril at [facility] update given and faxed clinicals will 
wait for their acceptance
DCP- Discharge Planning
 
Updated by [id number]: Laury Mustafa on 19   9:44 am CT
Patient Name: JOSEFA LEWIS                                     
Admission Status: ER   
Accout number: [id number]                              
 
Admission Date: 01-   
: 1945                                                        
Admission Diagnosis:SEPSIS, UNSPECIFIED ORGANISM   
Attending: MARLON BOO                                                
Current LOS:  17   
  
Anticipated DC Date: 2019   
Planned Disposition:    
Primary Insurance: MEDICARE A & B   
  
  
Discharge Planning Comments: CM SPOKE WITH PATIENT'S NURSE AND THE PATIENT IS 
NOW VOMITING. THE DOCTORS SAID TO HOLD OFF ON DC FOR TODAY AND WILL RE 
EVALUATE TOMORROW. CM CALLED [facility] [phone] AND INFORMED THEM THE 
PATIENT WILL NOT BE COMING TODAY. CM WILL FOLLOW AND ASSIST AS NEEDED WITH DC 
PLANNING/NEEDS.   
  
  
  
  
  
  
: Laury Mustafa
DCP- Discharge Planning
 
Updated by [id number]: Laury Mustafa on 19   9:31 am CT
Patient Name: JOSEFA LEWIS                                     
Admission Status: ER   
Accout number: [id number]                              
Admission Date: 01-   
: 1945                                                        
Admission Diagnosis:SEPSIS, UNSPECIFIED ORGANISM   
Attending: MARLON BOO                                                
Current LOS:  17   
  
Anticipated DC Date: 2019   
Planned Disposition:    
Primary Insurance: MEDICARE A & B   
  
  
Discharge Planning Comments: CM SPOKE WITH ELHAM STEWART AT [facility] 
[phone]. SHE STATED NURSE CAN CALL REPORT TO HER AND THEY WILL ACCEPT 
PATIENT TODAY. PATIENT TO GO BY EMS AS STATED BY NICOLLE , NOTES.  
  
  
  
  
  
  
: Laury Mustafa
DCP- Discharge Planning
 
 
Updated by [id number]: Ro Zarate on 19   2:20 pm CT
ABRIL WITH [facility] STATED THAT IF PATIENT IS STILL STABLE ON 
 THEY WILL ACCEPT PATIENT, SHE WILL NEED TO TRANSFER VIA EMS. [facility] AG AND DR MONTALVO AWARE. CM WILL CONTINUE TO FOLLOW AND ASSIST WITH DC 
PLANNING
DCP- Discharge Planning
 
Updated by [id number]: Ro Zarate on 19  12:51 pm CT
SENT CLINICAL UPDATES TO [facility], TO SEE IF THEY WILL ACCEPT PATIENT. CM WILL 
CONTINUE TO FOLLOW WITH DC PLANNING   
PATIENT'S FRIEND PHIL LEONARDO [phone]
DCP- Discharge Planning
 
Updated by [id number]: Ro Zarate on 19  11:51 am CT
PATIENT WITH AN ELEVATED WBC, DISCHARGE CANCELLED
DCP- Discharge Planning
 
Updated by [id number]: Ro Zarate on 19   1:28 pm CT
blood still infusing, [facility], it is too 
late to transfer at 1730, they will accept patient tomorrow. Patient will 
have to go via EMS
DCP- Discharge Planning
 
Updated by [email or web address]: Ro Zarate on 19  11:07 am CT
SPOKE WITH ABRIL AT [facility], UPDATED HER THAT THE PATIENT WAS GETTING BLOOD 
TODAY, DEPENDING ON WHAT TIME THE BLOOD IS COMPLETED THE PATIENT MAY DC.  
ABRIL WILL CALL ME BACK AND LET ME KNOW IF THERE IS A TIME THAT IS TOO LATE. 
CM WILL CONTINUE TO FOLLOW AND ASSIST WITH DC PLANNING
DCP- Discharge Planning
 
Updated by [id number]: Eunice An on 19   9:58 am CT
APN PRESENTED TO CM WITH QUESTIONS  REGARDING DISCHARGE TO ACUTE REHAB TODAY. 
PATIENT WITH QUESTIONS REGARDING DISCHARGE. PATIENT IS TEARFUL. STATES SHE 
WAS TOLD SHE WOULD BE LEAVING MONDAY OR TUESDAY. HAD QUESTIONS REGARDING HER 
REMAINING INSURANCE DAYS.  
CM CANNOT OBTAIN INFORMATION REGARDING INSURANCE DAYS OVER THE WEEKEND.   
TC TO [facility] ACUTE REHAB.  
SPOKE WITH PHIL BECK. SHE STATES [facility] HAS THE PATIENT SCHEDULED FOR 
MONDAY.  
CM ADVISED THE PATIENT. SPOKE WITH THE PRIMARY NURSE AND APN.  
WILL ASK WEEKDAY CM TO CALL BILLING ON MONDAY TO ADDRESS QUESTIONS REGARDING 
REMAINING HOSPITAL DAYS .
DCP- Discharge Planning
 
Updated by [id number]: Ro Zarate on 19   9:32 am CT
UPDATED CLINICAL SENT TO [facility], I SPOKE WITH ABRIL.  SHE DID 
SAY THAT THEY WOULD ACCEPT HER WHEN SHE WAS STABLE TO COME. THEY ONLY ACCEPT 
ON  IF SHE DISCHARGES OVER THE WEEKEND.  I LET ROYCE SANCHEZ AWARE OF 
THIS. CM WILL CONTINUE TO FOLLOW AND ASSIST WITH DC PLANNING
DCP- Discharge Planning
 
Updated by [email or web address]: Ro Zarate on 1/15/19   2:48 pm CT
 
REFERRAL SENT TO [facility], I SPOKE WITH ABRIL  
FAX # [phone]  
[facility] # [phone]
DCP- Discharge Planning
 
Updated by [id number]: Ro Zarate on 1/15/19   2:36 pm CT
MET WITH PATIENT SHE WOULD LIKE TO GO TO [facility] TO THEIR INPATIENT 
FACILITY
DCP- Discharge Planning
 
Updated by [id number]: Marcella Canales on 1/10/19   3:30 pm CT
Patient Name: JOSEFA LEWIS                                    
Admission Status: ER  
Accout number: [id number]                             
Admission Date: 01-  
: 1945                                                       
Admission Diagnosis:  
Attending: MARLON BOO                                               
Current LOS:  1  
 
Anticipated DC Date: 2019  
Planned Disposition:   
Primary Insurance: MEDICARE A & B  
 
 
Discharge Planning Comments:  
 
CM met with patient to complete initial dc planning assessment.  CM educated 
patient on the CM role and verbal consent given by patient to complete 
assessment.   Patient lives at home with her spouse but was currently at [facility] for therapy. Patient reports she has been in and 
out of the hopstial/rehab since April with infected knee.  At discharge 
patient plans to return to [facility] to complete her rehab and feels this 
is a safe discharge.  Patient reports she is not able to ambulate due to her 
knee and is wheelchair bound. Patient denied known discharge needs at this 
time. CM will continue to follow and will assist as needed with dc 
plans/needs.   
 
: Marcella Canales RN, [facility]
 DCPIA - Discharge Planning Initial Assessment
 
Updated by [id number]: Marcella Canales on 1/10/19   4:27 pm
*  Is the patient Alert and Oriented?
Yes
*  How many steps to enter\exit or inside your home? none *  PCP Dr. Primo Santoro * 
Pharmacy
[facility]
*  Preadmission Environment
Skilled Nursing Facility
*  [facility]
*  ADLs
 
Partial Dependent
*  Partial ADLs (Assistance needed)
Ambulation
Bathing
Dressing
Medication Management
Transfers
*  Equipment
Wheelchair
*  List name and contact numbers for known caregivers / representatives who 
currently or will assist patient after discharge:
Kashif Lewis - spouse - [phone]
*  Verbal permission to speak to the caregivers and representatives has been 
obtained from the patient.
Yes
*  Community resources currently utilized
None
*  Additional services required to return to the preadmission environment?
No
*  Can the patient safely return to the preadmission environment?
Yes
*  Has this patient been hospitalized within the prior 30 days at any 
hospital?
Yes
 
 
 
 
 
 
 
Last DP export: 19   2:30 pm
Patient Name: JOSEFA LEWIS
Encounter #: [id number]
Page [id number]
 
 
 
 
 
Electronically Signed by KJ LARA on 19 at 1549
 
 
 
 
 
 
**All edits/amendments must be made on the electronic document**
 
DICTATION DATE: 19     : CONNIE  19     
RPT#: [id number]                                DC DATE:        
                                               STATUS: ADM IN  
[facility]
 [place] [address]
***END OF REPORT***

## 2019-02-01 NOTE — MORECARE
CASE MANAGEMENT DISCHARGE SUMMARY
 
 
PATIENT: JOSEFA LEWIS                 UNIT: [id number]
ACCOUNT#: [id number]                       ADM DATE: 01/10/19
AGE: 73     : 43  SEX: F            ROOM/BED: D.2215    
AUTHOR: JANE,DOC                             PHYSICIAN:                               
 
REFERRING PHYSICIAN: MARLON BOO MD                
DATE OF SERVICE: 19
Discharge Plan
 
 
Patient Name: JOSEFA LEWIS
Facility: [facility]
Encounter #: [id number]
Medical Record #: [id number]
: 1945
Planned Disposition: 
Anticipated Discharge Date: 19
 
Discharge Date: 
Expected LOS: 4
Initial Reviewer: [id number]
Initial Review Date: 01/10/2019
Generated: 19   4:30 pm 
Comments
 
DCP- Discharge Planning
 
Updated by [id number]: Ro Zarate on 19   2:27 pm CT
Abril talking to patient, will possibility take on 
DCP- Discharge Planning
 
Updated by [id number]: Ro Zarate on 19   9:22 am CT
spoke with Abril at [facility] update given and faxed clinicals will 
wait for their acceptance
DCP- Discharge Planning
 
Updated by [id number]: Laury Mustafa on 19   9:44 am CT
Patient Name: JOSEFA LEWIS                                     
Admission Status: ER   
Accout number: [id number]                              
Admission Date: 01-   
: 1945                                                        
Admission Diagnosis:SEPSIS, UNSPECIFIED ORGANISM   
Attending: MARLON BOO                                                
Current LOS:  17   
  
Anticipated DC Date: 2019   
 
Planned Disposition:    
Primary Insurance: MEDICARE A & B   
  
  
Discharge Planning Comments: CM SPOKE WITH PATIENT'S NURSE AND THE PATIENT IS 
NOW VOMITING. THE DOCTORS SAID TO HOLD OFF ON DC FOR TODAY AND WILL RE 
EVALUATE TOMORROW. CM CALLED [facility] [phone] AND INFORMED THEM THE 
PATIENT WILL NOT BE COMING TODAY. CM WILL FOLLOW AND ASSIST AS NEEDED WITH DC 
PLANNING/NEEDS.   
  
  
  
  
  
  
: Laury Mustafa
DCP- Discharge Planning
 
Updated by [id number]: Laury Mustafa on 19   9:31 am CT
Patient Name: JOSEFA LEWIS                                     
Admission Status: ER   
Accout number: [id number]                              
Admission Date: 01-   
: 1945                                                        
Admission Diagnosis:SEPSIS, UNSPECIFIED ORGANISM   
Attending: MARLON BOO                                                
Current LOS:  17   
  
Anticipated DC Date: 2019   
Planned Disposition:    
Primary Insurance: MEDICARE A & B   
  
  
Discharge Planning Comments: CM SPOKE WITH PAT RN AT [facility] 
[phone]. SHE STATED NURSE CAN CALL REPORT TO HER AND THEY WILL ACCEPT 
PATIENT TODAY. PATIENT TO GO BY EMS AS STATED BY NICOLLE, , NOTES.  
  
  
  
  
  
  
: Laury Mustafa
DCP- Discharge Planning
 
Updated by [id number]: Ro Zarate on 19   2:20 pm CT
ABRIL WITH [facility] STATED THAT IF PATIENT IS STILL STABLE ON 
 THEY WILL ACCEPT PATIENT, SHE WILL NEED TO TRANSFER VIA EMS. AVIS AG AND DR MONTALVO AWARE. CM WILL CONTINUE TO FOLLOW AND ASSIST WITH DC 
PLANNING
DCP- Discharge Planning
 
 
Updated by [id number]: Ro Zarate on 19  12:51 pm CT
SENT CLINICAL UPDATES TO ABRIL, TO SEE IF THEY WILL ACCEPT PATIENT. CM WILL 
CONTINUE TO FOLLOW WITH DC PLANNING   
PATIENT'S FRIEND PHIL LEONARDO [phone]
DCP- Discharge Planning
 
Updated by [id number]: Ro Zarate on 19  11:51 am CT
PATIENT WITH AN ELEVATED WBC, DISCHARGE CANCELLED
DCP- Discharge Planning
 
Updated by [id number]: Ro Zaarte on 19   1:28 pm CT
blood still infusing, [facility] it is too 
late to transfer at 1730, they will accept patient tomorrow. Patient will 
have to go via EMS
DCP- Discharge Planning
 
Updated by [id number]: Ro Zarate on 19  11:07 am CT
SPOKE WITH ABRIL AT [facility], UPDATED HER THAT THE PATIENT WAS GETTING BLOOD 
TODAY, DEPENDING ON WHAT TIME THE BLOOD IS COMPLETED THE PATIENT MAY DC.  
ABRIL WILL CALL ME BACK AND LET ME KNOW IF THERE IS A TIME THAT IS TOO LATE. 
CM WILL CONTINUE TO FOLLOW AND ASSIST WITH DC PLANNING
DCP- Discharge Planning
 
Updated by [id number]: Eunice An on 19   9:58 am CT
APN PRESENTED TO CM WITH QUESTIONS  REGARDING DISCHARGE TO ACUTE REHAB TODAY. 
PATIENT WITH QUESTIONS REGARDING DISCHARGE. PATIENT IS TEARFUL. STATES SHE 
WAS TOLD SHE WOULD BE LEAVING MONDAY OR TUESDAY. HAD QUESTIONS REGARDING HER 
REMAINING INSURANCE DAYS.  
CM CANNOT OBTAIN INFORMATION REGARDING INSURANCE DAYS OVER THE WEEKEND.   
TC TO [facility] ACUTE REHAB.  
SPOKE WITH PHIL BECK. SHE STATES [facility] HAS THE PATIENT SCHEDULED FOR 
MONDAY.  
CM ADVISED THE PATIENT. SPOKE WITH THE PRIMARY NURSE AND APN.  
WILL ASK WEEKDAY CM TO CALL BILLING ON MONDAY TO ADDRESS QUESTIONS REGARDING 
REMAINING HOSPITAL DAYS .
DCP- Discharge Planning
 
Updated by [email or web address]: Ro Zarate on 19   9:32 am CT
UPDATED CLINICAL SENT TO [facility], I SPOKE WITH ABRIL.  SHE DID 
SAY THAT THEY WOULD ACCEPT HER WHEN SHE WAS STABLE TO COME. THEY ONLY ACCEPT 
ON  IF SHE DISCHARGES OVER THE WEEKEND.  I LET ROYCE SANCHEZ AWARE OF 
THIS. CM WILL CONTINUE TO FOLLOW AND ASSIST WITH DC PLANNING
DCP- Discharge Planning
 
Updated by [id number]: Ro Zarate on 1/15/19   2:48 pm CT
[facility] I SPOKE WITH ABRIL  
FAX # [phone]  
[facility] # [phone]
DCP- Discharge Planning
 
Updated by [id number]: Ro Zarate on 1/15/19   2:36 pm CT
MET WITH PATIENT SHE WOULD LIKE TO GO TO [facility] TO THEIR INPATIENT 
 
FACILITY
DCP- Discharge Planning
 
Updated by [id number]: Marcella Canales on 1/10/19   3:30 pm CT
Patient Name: JOSEFA LEWIS                                    
Admission Status: ER  
Accout number: [id number]                             
Admission Date: 01-  
: 071945                                                       
Admission Diagnosis:  
Attending: MARLON BOO                                               
Current LOS:  1  
 
Anticipated DC Date: 2019  
Planned Disposition:   
Primary Insurance: MEDICARE A & B  
 
 
Discharge Planning Comments:  
 
CM met with patient to complete initial dc planning assessment.  CM educated 
patient on the CM role and verbal consent given by patient to complete 
assessment.   Patient lives at home with her spouse but was currently at [facility] for therapy. Patient reports she has been in and 
out of the hopstial/rehab since April with infected knee.  At discharge 
patient plans to return [facility] to complete her rehab and feels this 
is a safe discharge.  Patient reports she is not able to ambulate due to her 
knee and is wheelchair bound. Patient denied known discharge needs at this 
time. CM will continue to follow and will assist as needed with dc 
plans/needs.   
 
: Marcella Canales RN, [facility]
 DCPIA - Discharge Planning Initial Assessment
 
Updated by [id number]: Marcella Canales on 1/10/19   4:27 pm
*  Is the patient Alert and Oriented?
Yes
*  How many steps to enter\exit or inside your home? none *  PCP Dr. Primo Santoro * 
Pharmacy
[facility]
*  Preadmission Environment
Skilled Nursing Facility
*  Facility Name
[facility]
*  ADLs
Partial Dependent
*  Partial ADLs (Assistance needed)
Ambulation
Bathing
Dressing
Medication Management
Transfers
 
*  Equipment
Wheelchair
*  List name and contact numbers for known caregivers / representatives who 
currently or will assist patient after discharge:
Kashif Lewis - spouse - [phone]
*  Verbal permission to speak to the caregivers and representatives has been 
obtained from the patient.
Yes
*  Community resources currently utilized
None
*  Additional services required to return to the preadmission environment?
No
*  Can the patient safely return to the preadmission environment?
Yes
*  Has this patient been hospitalized within the prior 30 days at any 
hospital?
Yes
 
 
 
 
 
 
 
Last DP export: 19   9:27 am
Patient Name: JOSEFA LEWIS
Encounter #: [id number]
Page [id number]
 
 
 
 
 
Electronically Signed by KJ LARA on 19 at 1530
 
 
 
 
 
 
**All edits/amendments must be made on the electronic document**
 
DICTATION DATE: 19     : CONNIE  19     
RPT#: [id number]                                DC DATE:        
                                               STATUS: ADM IN  
[facility]
[address] [place] [address]
***END OF REPORT***

## 2019-02-02 VITALS — DIASTOLIC BLOOD PRESSURE: 70 MMHG | SYSTOLIC BLOOD PRESSURE: 103 MMHG

## 2019-02-02 VITALS — DIASTOLIC BLOOD PRESSURE: 57 MMHG | SYSTOLIC BLOOD PRESSURE: 132 MMHG

## 2019-02-02 VITALS — DIASTOLIC BLOOD PRESSURE: 66 MMHG | SYSTOLIC BLOOD PRESSURE: 152 MMHG

## 2019-02-02 VITALS — SYSTOLIC BLOOD PRESSURE: 157 MMHG | DIASTOLIC BLOOD PRESSURE: 64 MMHG

## 2019-02-02 VITALS — SYSTOLIC BLOOD PRESSURE: 166 MMHG | DIASTOLIC BLOOD PRESSURE: 65 MMHG

## 2019-02-02 VITALS — DIASTOLIC BLOOD PRESSURE: 71 MMHG | SYSTOLIC BLOOD PRESSURE: 100 MMHG

## 2019-02-02 LAB
ANION GAP SERPL CALC-SCNC: 15.7 MMOL/L (ref 8–16)
BASOPHILS NFR BLD AUTO: 1.1 % (ref 0–2)
BUN SERPL-MCNC: 15 MG/DL (ref 7–18)
CALCIUM SERPL-MCNC: 8.2 MG/DL (ref 8.5–10.1)
CHLORIDE SERPL-SCNC: 115 MMOL/L (ref 98–107)
CO2 SERPL-SCNC: 17.5 MMOL/L (ref 21–32)
CREAT SERPL-MCNC: 1.2 MG/DL (ref 0.6–1.3)
EOSINOPHIL NFR BLD: 4.5 % (ref 0–7)
ERYTHROCYTE [DISTWIDTH] IN BLOOD BY AUTOMATED COUNT: 15.9 % (ref 11.5–14.5)
GLUCOSE SERPL-MCNC: 81 MG/DL (ref 74–106)
HCT VFR BLD CALC: 31.3 % (ref 36–48)
HGB BLD-MCNC: 10.9 G/DL (ref 12–16)
IMM GRANULOCYTES NFR BLD: 3.3 % (ref 0–5)
LYMPHOCYTES NFR BLD AUTO: 26.3 % (ref 15–50)
MCH RBC QN AUTO: 29.7 PG (ref 26–34)
MCHC RBC AUTO-ENTMCNC: 34.8 G/DL (ref 31–37)
MCV RBC: 85.3 FL (ref 80–100)
MONOCYTES NFR BLD: 10.8 % (ref 2–11)
NEUTROPHILS NFR BLD AUTO: 54 % (ref 40–80)
OSMOLALITY SERPL CALC.SUM OF ELEC: 288 MOSM/KG (ref 275–300)
PLATELET # BLD: 232 10X3/UL (ref 130–400)
PMV BLD AUTO: 10.3 FL (ref 7.4–10.4)
POTASSIUM SERPL-SCNC: 3.2 MMOL/L (ref 3.5–5.1)
RBC # BLD AUTO: 3.67 10X6/UL (ref 4–5.4)
SODIUM SERPL-SCNC: 145 MMOL/L (ref 136–145)
WBC # BLD AUTO: 6.5 10X3/UL (ref 4.8–10.8)

## 2019-02-03 VITALS — SYSTOLIC BLOOD PRESSURE: 121 MMHG | DIASTOLIC BLOOD PRESSURE: 59 MMHG

## 2019-02-03 VITALS — SYSTOLIC BLOOD PRESSURE: 158 MMHG | DIASTOLIC BLOOD PRESSURE: 71 MMHG

## 2019-02-03 VITALS — SYSTOLIC BLOOD PRESSURE: 150 MMHG | DIASTOLIC BLOOD PRESSURE: 57 MMHG

## 2019-02-03 VITALS — SYSTOLIC BLOOD PRESSURE: 128 MMHG | DIASTOLIC BLOOD PRESSURE: 61 MMHG

## 2019-02-03 VITALS — DIASTOLIC BLOOD PRESSURE: 56 MMHG | SYSTOLIC BLOOD PRESSURE: 154 MMHG

## 2019-02-03 VITALS — DIASTOLIC BLOOD PRESSURE: 67 MMHG | SYSTOLIC BLOOD PRESSURE: 151 MMHG

## 2019-02-03 LAB
ANION GAP SERPL CALC-SCNC: 15 MMOL/L (ref 8–16)
BUN SERPL-MCNC: 14 MG/DL (ref 7–18)
CALCIUM SERPL-MCNC: 8 MG/DL (ref 8.5–10.1)
CHLORIDE SERPL-SCNC: 113 MMOL/L (ref 98–107)
CO2 SERPL-SCNC: 17.3 MMOL/L (ref 21–32)
CREAT SERPL-MCNC: 1.3 MG/DL (ref 0.6–1.3)
GLUCOSE SERPL-MCNC: 96 MG/DL (ref 74–106)
MAGNESIUM SERPL-MCNC: 1.7 MG/DL (ref 1.8–2.4)
OSMOLALITY SERPL CALC.SUM OF ELEC: 283 MOSM/KG (ref 275–300)
POTASSIUM SERPL-SCNC: 3.3 MMOL/L (ref 3.5–5.1)
SODIUM SERPL-SCNC: 142 MMOL/L (ref 136–145)

## 2019-02-04 VITALS — DIASTOLIC BLOOD PRESSURE: 57 MMHG | SYSTOLIC BLOOD PRESSURE: 138 MMHG

## 2019-02-04 VITALS — SYSTOLIC BLOOD PRESSURE: 140 MMHG | DIASTOLIC BLOOD PRESSURE: 56 MMHG

## 2019-02-04 VITALS — DIASTOLIC BLOOD PRESSURE: 51 MMHG | SYSTOLIC BLOOD PRESSURE: 133 MMHG

## 2019-02-04 LAB
ANION GAP SERPL CALC-SCNC: 14.1 MMOL/L (ref 8–16)
BASOPHILS NFR BLD AUTO: 0.7 % (ref 0–2)
BUN SERPL-MCNC: 13 MG/DL (ref 7–18)
CALCIUM SERPL-MCNC: 8.1 MG/DL (ref 8.5–10.1)
CHLORIDE SERPL-SCNC: 112 MMOL/L (ref 98–107)
CO2 SERPL-SCNC: 17.5 MMOL/L (ref 21–32)
CREAT SERPL-MCNC: 1.3 MG/DL (ref 0.6–1.3)
EOSINOPHIL NFR BLD: 5.7 % (ref 0–7)
ERYTHROCYTE [DISTWIDTH] IN BLOOD BY AUTOMATED COUNT: 16.1 % (ref 11.5–14.5)
GLUCOSE SERPL-MCNC: 85 MG/DL (ref 74–106)
HCT VFR BLD CALC: 33.8 % (ref 36–48)
HGB BLD-MCNC: 9.9 G/DL (ref 12–16)
IMM GRANULOCYTES NFR BLD: 3.2 % (ref 0–5)
LYMPHOCYTES NFR BLD AUTO: 28.6 % (ref 15–50)
MAGNESIUM SERPL-MCNC: 1.8 MG/DL (ref 1.8–2.4)
MCH RBC QN AUTO: 28.4 PG (ref 26–34)
MCHC RBC AUTO-ENTMCNC: 29.3 G/DL (ref 31–37)
MCV RBC: 96.8 FL (ref 80–100)
MONOCYTES NFR BLD: 11 % (ref 2–11)
NEUTROPHILS NFR BLD AUTO: 50.8 % (ref 40–80)
OSMOLALITY SERPL CALC.SUM OF ELEC: 277 MOSM/KG (ref 275–300)
PLATELET # BLD: 256 10X3/UL (ref 130–400)
PMV BLD AUTO: 12.3 FL (ref 7.4–10.4)
POTASSIUM SERPL-SCNC: 3.6 MMOL/L (ref 3.5–5.1)
RBC # BLD AUTO: 3.49 10X6/UL (ref 4–5.4)
SODIUM SERPL-SCNC: 140 MMOL/L (ref 136–145)
WBC # BLD AUTO: 6.9 10X3/UL (ref 4.8–10.8)

## 2019-02-04 NOTE — MORECARE
CASE MANAGEMENT DISCHARGE SUMMARY
 
 
PATIENT: JOSEFA LEWIS                 UNIT: [id number]
ACCOUNT#: [id number]                       ADM DATE: 01/10/19
AGE: 73     : [birth year]  SEX: F            ROOM/BED: D.2215    
AUTHOR: JANE,DOC                             PHYSICIAN:                               
 
REFERRING PHYSICIAN: MARLON BOO MD                
DATE OF SERVICE: 19
Discharge Plan
 
 
Patient Name: JOSEFA LEWIS
Facility: [facility]:[place]
Encounter #: [id number]
Medical Record #: [id number]
: 1945
Planned Disposition: 
Anticipated Discharge Date: 19
 
Discharge Date: 
Expected LOS: 4
Initial Reviewer: [id number]
Initial Review Date: 01/10/2019
Generated: 19  10:42 am 
Comments
 
DCP- Discharge Planning
 
Updated by [id number]: Ro Zarate on 19   8:40 am CT
PATIENT WILL BE DISCHARGING TO INPATIENT REHAB IN [place] VIA EMS TODAY, IMM 
SERVED AND EXPLAINED TO PATIENT, DC INFORMATION FAXED TO ABRIL AT [place]   
CM WILL CONTINUE TO FOLLOW AND ASSIST WITH DC PLANNING AS NEEDED
DCP- Discharge Planning
 
Updated by [id number]: Daniela Zhou on 19   2:47 pm CT
[facility]. She would like to have therapy 
notes sent over the weekend to see how she does and states she will touch 
base on Monday. CM will continue to follow and assist with discharge 
planning/needs.
DCP- Discharge Planning
 
Updated by [id number]: Ro Zarate on 19   2:27 pm CT
Abril talking to patient, will possibility take on 
DCP- Discharge Planning
 
Updated by [id number]: Ro Zarate on 19   9:22 am CT
spoke with Abril at [facility] update given and faxed clinicals will 
wait for their acceptance
 
DCP- Discharge Planning
 
Updated by [id number]: Laury Mustafa on 19   9:44 am CT
Patient Name: JOSEFA LEWIS                                     
Admission Status: ER   
Accout number: [id number]                              
Admission Date: 01-   
: 1945                                                        
Admission Diagnosis:SEPSIS, UNSPECIFIED ORGANISM   
Attending: MARLON BOO                                                
Current LOS:  17   
  
Anticipated DC Date: 2019   
Planned Disposition:    
Primary Insurance: MEDICARE A & B   
  
  
Discharge Planning Comments: CM SPOKE WITH PATIENT'S NURSE AND THE PATIENT IS 
NOW VOMITING. THE DOCTORS SAID TO HOLD OFF ON DC FOR TODAY AND WILL RE 
EVALUATE TOMORROW. CM CALLED [facility] [phone] AND INFORMED THEM THE 
PATIENT WILL NOT BE COMING TODAY. CM WILL FOLLOW AND ASSIST AS NEEDED WITH DC 
PLANNING/NEEDS.   
  
  
  
  
  
  
: Laury Mustafa
DCP- Discharge Planning
 
Updated by [id number]: Laury Mustafa on 19   9:31 am CT
Patient Name: JOSEFA LEWIS                                     
Admission Status: ER   
Accout number: [id number]                              
Admission Date: 01-   
: 1945                                                        
Admission Diagnosis:SEPSIS, UNSPECIFIED ORGANISM   
Attending: MARLON BOO                                                
Current LOS:  17   
  
Anticipated DC Date: 2019   
Planned Disposition:    
Primary Insurance: MEDICARE A & B   
  
  
Discharge Planning Comments: ZULLY SPOKE WITH ELHAM STEWART AT [facility] 
[phone]. SHE STATED NURSE CAN CALL REPORT TO HER AND THEY WILL ACCEPT 
PATIENT TODAY. PATIENT TO GO BY EMS AS STATED BY NICOLLE, , NOTES.  
  
  
  
 
  
  
  
: Laury Moon
DCP- Discharge Planning
 
Updated by [email or web address]: Ro Zarate on 19   2:20 pm CT
[facility] IF PATIENT IS STILL STABLE ON 
 THEY WILL ACCEPT PATIENT, SHE WILL NEED TO TRANSFER VIA EMS. [facility] AG AND DR MONTALVO AWARE. CM WILL CONTINUE TO FOLLOW AND ASSIST WITH DC 
PLANNING
DCP- Discharge Planning
 
Updated by [id number]: Ro Zarate on 19  12:51 pm CT
SENT CLINICAL UPDATES TO [facility], TO SEE IF THEY WILL ACCEPT PATIENT. CM WILL 
CONTINUE TO FOLLOW WITH DC PLANNING   
PATIENT'S FRIEND PHIL LEONARDO [phone]
DCP- Discharge Planning
 
Updated by [id number]: Ro Zarate on 19  11:51 am CT
PATIENT WITH AN ELEVATED WBC, DISCHARGE CANCELLED
DCP- Discharge Planning
 
Updated by [id number]: Ro Zarate on 19   1:28 pm CT
blood still infusing, [facility], it is too 
late to transfer at 1730, they will accept patient tomorrow. Patient will 
have to go via EMS
DCP- Discharge Planning
 
Updated by [id number]: Ro Zarate on 19  11:07 am CT
SPOKE WITH ABRIL AT [facility], UPDATED HER THAT THE PATIENT WAS GETTING BLOOD 
TODAY, DEPENDING ON WHAT TIME THE BLOOD IS COMPLETED THE PATIENT MAY DC.  
ABRIL WILL CALL ME BACK AND LET ME KNOW IF THERE IS A TIME THAT IS TOO LATE. 
CM WILL CONTINUE TO FOLLOW AND ASSIST WITH DC PLANNING
DCP- Discharge Planning
 
Updated by [email or web address]: Eunice Reagans on 19   9:58 am CT
APN PRESENTED TO CM WITH QUESTIONS  REGARDING DISCHARGE TO ACUTE REHAB TODAY. 
PATIENT WITH QUESTIONS REGARDING DISCHARGE. PATIENT IS TEARFUL. STATES SHE 
WAS TOLD SHE WOULD BE LEAVING MONDAY OR TUESDAY. HAD QUESTIONS REGARDING HER 
REMAINING INSURANCE DAYS.  
CM CANNOT OBTAIN INFORMATION REGARDING INSURANCE DAYS OVER THE WEEKEND.   
TC TO [facility] ACUTE REHAB.  
SPOKE WITH PHIL BECK. SHE STATES [facility] [facility] HAS THE PATIENT SCHEDULED FOR 
MONDAY.  
CM ADVISED THE PATIENT. SPOKE WITH THE PRIMARY NURSE AND APN.  
WILL ASK WEEKDAY CM TO CALL BILLING ON MONDAY TO ADDRESS QUESTIONS REGARDING 
REMAINING HOSPITAL DAYS .
DCP- Discharge Planning
 
Updated by [id number]: Ro Zarate on 19   9:32 am CT
UPDATED CLINICAL SENT TO [facility], I SPOKE WITH ABRIL.  SHE DID 
 
SAY THAT THEY WOULD ACCEPT HER WHEN SHE WAS STABLE TO COME. THEY ONLY ACCEPT 
ON  IF SHE DISCHARGES OVER THE WEEKEND.  I LET ROYCE SANCHEZ AWARE OF 
THIS. CM WILL CONTINUE TO FOLLOW AND ASSIST WITH DC PLANNING
DCP- Discharge Planning
 
Updated by [id number]: Ro Zarate on 1/15/19   2:48 pm CT
REFERRAL SENT TO [facility], I SPOKE WITH ABRIL  
FAX # [phone]  
[facility] # [phone]
DCP- Discharge Planning
 
Updated by [id number]: Ro Zarate on 1/15/19   2:36 pm CT
MET WITH PATIENT SHE WOULD LIKE TO GO TO [facility] TO THEIR INPATIENT 
FACILITY
DCP- Discharge Planning
 
Updated by [id number]: Marcella Canales on 1/10/19   3:30 pm CT
Patient Name: JOSEFA LEWIS                                    
Admission Status: ER  
Accout number: [id number]                             
Admission Date: 01-  
: 4                                                       
Admission Diagnosis:  
Attending: MARLON BOO                                               
Current LOS:  1  
 
Anticipated DC Date: 2019  
Planned Disposition:   
Primary Insurance: MEDICARE A & B  
 
 
Discharge Planning Comments:  
 
CM met with patient to complete initial dc planning assessment.  CM educated 
patient on the CM role and verbal consent given by patient to complete 
assessment.   Patient lives at home with her spouse but was currently at [facility] for therapy. Patient reports she has been in and 
out of the hopstial/rehab since April with infected knee.  At discharge 
patient plans to return to [facility] to complete her rehab and feels this 
is a safe discharge.  Patient reports she is not able to ambulate due to her 
knee and is wheelchair bound. Patient denied known discharge needs at this 
time. CM will continue to follow and will assist as needed with dc 
plans/needs.   
 
: Marcella Canales RN, [facility]
 DCPIA - Discharge Planning Initial Assessment
 
Updated by [id number]: Marcella Canales on 1/10/19   4:27 pm
*  Is the patient Alert and Oriented?
Yes
*  How many steps to enter\exit or inside your home? none *  PCP Dr. Primo Santoro * 
Pharmacy
 
[facility]
*  Preadmission Environment
Skilled Nursing Facility
*  Facility Name
[facility]
*  ADLs
Partial Dependent
*  Partial ADLs (Assistance needed)
Ambulation
Bathing
Dressing
Medication Management
Transfers
*  Equipment
Wheelchair
*  List name and contact numbers for known caregivers / representatives who 
currently or will assist patient after discharge:
Kashif Lewis - [facility] - [phone]
*  Verbal permission to speak to the caregivers and representatives has been 
obtained from the patient.
Yes
*  Community resources currently utilized
None
*  Additional services required to return to the preadmission environment?
No
*  Can the patient safely return to the preadmission environment?
Yes
*  Has this patient been hospitalized within the prior 30 days at any 
hospital?
Yes
 
 
 
 
 
Coverage Notice
 
Reviewer: [email or web address] - Ro Zarate
 
Notice Issued Date-Time: 2019   9:35
Notice Type: IM Discharge Notice
 
Notice Delivered To: Patient
Relationship to Patient: 
Representative Name: 
 
Delivery Method: HAND - Hand Delivered
Jessica Days:
Prior Verbal Notification: 
 
Recipient Understood Notice: Yes
Recipient Signature: Yes
 
Med Rec Note Co-signed by Attending:
 
Coverage Notice Comment:  
 
Last DP export: 19   2:49 pm
Patient Name: JOSEFA LEWIS
Encounter #: [id number]
Page [id number]
 
 
 
 
 
Electronically Signed by KJ LARA on 19 at 0942
 
 
 
 
 
 
**All edits/amendments must be made on the electronic document**
 
DICTATION DATE: 19     : CONNIE  19     
RPT#: [id number]                                DC DATE:        
                                               STATUS: ADM IN  
[facility]
[address] [place] [address]
***END OF REPORT***

## 2019-02-11 NOTE — MORECARE
CASE MANAGEMENT DISCHARGE SUMMARY
 
 
PATIENT: JOSEFA LEWIS                 UNIT: [id number]
ACCOUNT#: [id number]                       ADM DATE: 01/10/19
AGE: 73     : 43  SEX: F            ROOM/BED: D.2215    
AUTHOR: JANEDOC                             PHYSICIAN:                               
 
REFERRING PHYSICIAN: MARLON BOO MD                
DATE OF SERVICE: 19
Discharge Plan
 
 
Patient Name: JOSEFA LEWIS
Facility: [facility]:[place]
Encounter #: [id number]
Medical Record #: [id number]
: 1945
Planned Disposition: 
Anticipated Discharge Date: 19
 
Discharge Date: 2019
Expected LOS: 4
Initial Reviewer: [email or web address]
Initial Review Date: 01/10/2019
Generated: 19  12:29 pm 
Comments
 
DCP- Discharge Planning
 
Updated by [id number]: Ro Zarate on 19   8:40 am CT
PATIENT WILL BE DISCHARGING TO [facility]PATIENT [facility] IN [place] VIA EMS TODAY, IMM 
SERVED AND EXPLAINED TO PATIENT, DC INFORMATION FAXED TO ABRIL AT [place]   
CM WILL CONTINUE TO FOLLOW AND ASSIST WITH DC PLANNING AS NEEDED
DCP- Discharge Planning
 
Updated by [id number]: Daniela Zhou on 19   2:47 pm CT
Received a call from Abril from [facility]. She would like to have therapy 
notes sent over the weekend to see how she does and states she will touch 
base on Monday. CM will continue to follow and assist with discharge 
planning/needs.
DCP- Discharge Planning
 
Updated by [id number]: Ro Zarate on 19   2:27 pm CT
Abril talking to patient, will possibility take on 
DCP- Discharge Planning
 
Updated by [id number]: Ro Zarate on 19   9:22 am CT
spoke with Abril at [facility] update given and faxed clinicals will 
wait for their acceptance
 
DCP- Discharge Planning
 
Updated by [id number]: Laury Mustafa on 19   9:44 am CT
Patient Name: JOSEFA LEWIS                                     
Admission Status: ER   
Accout number: [id number]                              
Admission Date: 01-   
: 1945                                                        
Admission Diagnosis:SEPSIS, UNSPECIFIED ORGANISM   
Attending: MARLON BOO                                                
Current LOS:  17   
  
Anticipated DC Date: 2019   
Planned Disposition:    
Primary Insurance: MEDICARE A & B   
  
  
Discharge Planning Comments: CM SPOKE WITH PATIENT'S NURSE AND THE PATIENT IS 
NOW VOMITING. THE DOCTORS SAID TO HOLD OFF ON DC FOR TODAY AND WILL RE 
EVALUATE TOMORROW. CM CALLED [facility] [phone] AND INFORMED THEM THE 
PATIENT WILL NOT BE COMING TODAY. CM WILL FOLLOW AND ASSIST AS NEEDED WITH DC 
PLANNING/NEEDS.   
  
  
  
  
  
  
: Laury Mustafa
DCP- Discharge Planning
 
Updated by [id number]: Laury Mustafa on 19   9:31 am CT
Patient Name: JOSEFA LEWIS                                     
Admission Status: ER   
Accout number: [id number]                              
Admission Date: 01-   
: 1945                                                        
Admission Diagnosis:SEPSIS, UNSPECIFIED ORGANISM   
Attending: MARLON BOO                                                
Current LOS:  17   
  
Anticipated DC Date: 2019   
Planned Disposition:    
Primary Insurance: MEDICARE A & B   
  
  
Discharge Planning Comments: ZULLY SPOKE WITH ELHAM STEWART AT [facility] 
[phone]. SHE STATED NURSE CAN CALL REPORT TO HER AND THEY WILL ACCEPT 
PATIENT TODAY. PATIENT TO GO BY EMS AS STATED BY NICOLLE, , NOTES.  
  
  
  
 
  
  
  
: Laury Mustafa
DCP- Discharge Planning
 
Updated by [id number]: Ro Zarate on 19   2:20 pm CT
ABRIL WITH [facility] STATED THAT IF PATIENT IS STILL STABLE ON 
 THEY WILL ACCEPT PATIENT, SHE WILL NEED TO TRANSFER VIA EMS. AVIS AG AND DR MONTALVO AWARE. CM WILL CONTINUE TO FOLLOW AND ASSIST WITH DC 
PLANNING
DCP- Discharge Planning
 
Updated by [email or web address]: Ro Zarate on 19  12:51 pm CT
SENT CLINICAL UPDATES TO ABRIL, TO SEE IF THEY WILL ACCEPT PATIENT. CM WILL 
CONTINUE TO FOLLOW WITH DC PLANNING   
PATIENT'S FRIEND PHLI LEONARDO [phone]
DCP- Discharge Planning
 
Updated by [id number]: oR Zarate on 19  11:51 am CT
PATIENT WITH AN ELEVATED WBC, DISCHARGE CANCELLED
DCP- Discharge Planning
 
Updated by [id number]: Ro Zarate on 19   1:28 pm CT
blood still infusing, spoke with abril at [facility], it is too 
late to transfer at 1730, they will accept patient tomorrow. Patient will 
have to go via EMS
DCP- Discharge Planning
 
Updated by [id number]: Ro Zarate on 19  11:07 am CT
SPOKE WITH ABRIL AT [place], UPDATED HER THAT THE PATIENT WAS GETTING BLOOD 
TODAY, DEPENDING ON WHAT TIME THE BLOOD IS COMPLETED THE PATIENT MAY DC.  
ABRIL WILL CALL ME BACK AND LET ME KNOW IF THERE IS A TIME THAT IS TOO LATE. 
CM WILL CONTINUE TO FOLLOW AND ASSIST WITH DC PLANNING
DCP- Discharge Planning
 
Updated by [id number]: Euniceciarra An on 19   9:58 am CT
APN PRESENTED TO CM WITH QUESTIONS  REGARDING DISCHARGE TO ACUTE REHAB TODAY. 
PATIENT WITH QUESTIONS REGARDING DISCHARGE. PATIENT IS TEARFUL. STATES SHE 
WAS TOLD SHE WOULD BE LEAVING MONDAY OR TUESDAY. HAD QUESTIONS REGARDING HER 
REMAINING INSURANCE DAYS.  
CM CANNOT OBTAIN INFORMATION REGARDING INSURANCE DAYS OVER THE WEEKEND.   
TC TO [facility] ACUTE REH[facility].  
SPOKE WITH PHIL BECK. SHE STATES [facility] HAS THE PATIENT SCHEDULED FOR 
MONDAY.  
CM ADVISED THE PATIENT. SPOKE WITH THE PRIMARY NURSE AND APN.  
WILL ASK WEEKDAY CM TO CALL BILLING ON MONDAY TO ADDRESS QUESTIONS REGARDING 
REMAINING HOSPITAL DAYS .
DCP- Discharge Planning
 
Updated by [id number]: Ro Zarate on 19   9:32 am CT
UPDATED CLINICAL SENT TO [facility], I SPOKE WITH ABRIL.  SHE DID 
 
SAY THAT THEY WOULD ACCEPT HER WHEN SHE WAS STABLE TO COME. THEY ONLY ACCEPT 
ON  IF SHE DISCHARGES OVER THE WEEKEND.  I LET ROYCE SANCHEZ AWARE OF 
THIS. CM WILL CONTINUE TO FOLLOW AND ASSIST WITH DC PLANNING
DCP- Discharge Planning
 
Updated by [id number]: Ro Zarate on 1/15/19   2:48 pm CT
REFERRAL SENT TO [facility], I SPOKE WITH ABRIL  
FAX # [phone]  
[facility] # [phone]
DCP- Discharge Planning
 
Updated by [id number]: Ro Zarate on 1/15/19   2:36 pm CT
MET WITH PATIENT SHE WOULD LIKE TO GO TO [facility] TO THEIR INPATIENT 
FACILITY
DCP- Discharge Planning
 
Updated by [id number]: Marcella Canales on 1/10/19   3:30 pm CT
Patient Name: JOSEFA LEWIS                                    
Admission Status: ER  
Accout number: [id number]                             
Admission Date: 01-  
: 1945                                                       
Admission Diagnosis:  
Attending: MARLON BOO                                               
Current LOS:  1  
 
Anticipated DC Date: 2019  
Planned Disposition:   
Primary Insurance: MEDICARE A & B  
 
 
Discharge Planning Comments:  
 
CM met with patient to complete initial dc planning assessment.  CM educated 
patient on the CM role and verbal consent given by patient to complete 
assessment.   Patient lives at home with her spouse but was currently at [facility] for therapy. Patient reports she has been in and 
out of the hopstial/rehab since April with infected knee.  At discharge 
patient plans to return to [facility] to complete her rehab and feels this 
is a safe discharge.  Patient reports she is not able to ambulate due to her 
knee and is wheelchair bound. Patient denied known discharge needs at this 
time. CM will continue to follow and will assist as needed with dc 
plans/needs.   
 
: Marcella Canales RN, [facility]
 DCPIA - Discharge Planning Initial Assessment
 
Updated by [id number]: Marcella Canales on 1/10/19   4:27 pm
*  Is the patient Alert and Oriented?
Yes
*  How many steps to enter\exit or inside your home? none *  PCP Dr. Primo Santoro * 
Pharmacy
 
[facility]
*  Preadmission Environment
Skilled Nursing Facility
*  Facility Name
[facility]
*  ADLs
Partial Dependent
*  Partial ADLs (Assistance needed)
Ambulation
Bathing
Dressing
Medication Management
Transfers
*  Equipment
Wheelchair
*  List name and contact numbers for known caregivers / representatives who 
currently or will assist patient after discharge:
Kashif Lewis - spouse - [phone]
*  Verbal permission to speak to the caregivers and representatives has been 
obtained from the patient.
Yes
*  Community resources currently utilized
None
*  Additional services required to return to the preadmission environment?
No
*  Can the patient safely return to the preadmission environment?
Yes
*  Has this patient been hospitalized within the prior 30 days at any 
hospital?
Yes
 
 
 
 
 
Coverage Notice
 
Reviewer: [email or web address] Gagandeep Zarate
 
Notice Issued Date-Time: 2019   9:35
Notice Type: IM Discharge Notice
 
Notice Delivered To: Patient
Relationship to Patient: 
Representative Name: 
 
Delivery Method: HAND - Hand Delivered
Jessica Days:
Prior Verbal Notification: 
 
Recipient Understood Notice: Yes
Recipient Signature: Yes
 
Med Rec Note Co-signed by Attending:
 
Coverage Notice Comment:  
 
Last DP export: 19   8:42 am
Patient Name: JOSEFA LEWIS
Encounter #: [id number]
Page [id number]
 
 
 
 
 
Electronically Signed by KJ LARA on 19 at 1130
 
 
 
 
 
 
**All edits/amendments must be made on the electronic document**
 
DICTATION DATE: 19     : CONNIE  19 1129     
RPT#: [id number]                                DC DATE:19
                                               STATUS: DIS IN  
[facility]
[address] [place] [address]
***END OF REPORT***